# Patient Record
Sex: MALE | Race: WHITE | NOT HISPANIC OR LATINO | Employment: OTHER | URBAN - METROPOLITAN AREA
[De-identification: names, ages, dates, MRNs, and addresses within clinical notes are randomized per-mention and may not be internally consistent; named-entity substitution may affect disease eponyms.]

---

## 2019-08-13 ENCOUNTER — HOSPITAL ENCOUNTER (OUTPATIENT)
Dept: NON INVASIVE DIAGNOSTICS | Facility: HOSPITAL | Age: 64
Discharge: HOME | DRG: 247 | End: 2019-08-13
Attending: INTERNAL MEDICINE
Payer: MEDICARE

## 2019-08-13 ENCOUNTER — APPOINTMENT (EMERGENCY)
Dept: RADIOLOGY | Facility: HOSPITAL | Age: 64
End: 2019-08-13
Payer: MEDICARE

## 2019-08-13 ENCOUNTER — HOSPITAL ENCOUNTER (INPATIENT)
Facility: HOSPITAL | Age: 64
LOS: 2 days | Discharge: HOME | DRG: 247 | End: 2019-08-15
Attending: INTERNAL MEDICINE | Admitting: INTERNAL MEDICINE
Payer: MEDICARE

## 2019-08-13 ENCOUNTER — HOSPITAL ENCOUNTER (EMERGENCY)
Facility: HOSPITAL | Age: 64
End: 2019-08-13
Attending: EMERGENCY MEDICINE | Admitting: EMERGENCY MEDICINE
Payer: MEDICARE

## 2019-08-13 ENCOUNTER — APPOINTMENT (OUTPATIENT)
Dept: NON INVASIVE DIAGNOSTICS | Facility: HOSPITAL | Age: 64
DRG: 247 | End: 2019-08-13
Attending: INTERNAL MEDICINE
Payer: MEDICARE

## 2019-08-13 VITALS
DIASTOLIC BLOOD PRESSURE: 71 MMHG | RESPIRATION RATE: 18 BRPM | HEART RATE: 90 BPM | OXYGEN SATURATION: 98 % | SYSTOLIC BLOOD PRESSURE: 125 MMHG | TEMPERATURE: 98.1 F

## 2019-08-13 DIAGNOSIS — R07.9 CHEST PAIN: Primary | ICD-10-CM

## 2019-08-13 DIAGNOSIS — R77.8 ELEVATED TROPONIN: ICD-10-CM

## 2019-08-13 DIAGNOSIS — E87.6 HYPOKALEMIA: ICD-10-CM

## 2019-08-13 DIAGNOSIS — I21.3 ACUTE ST ELEVATION MYOCARDIAL INFARCTION (STEMI), UNSPECIFIED ARTERY (HCC): ICD-10-CM

## 2019-08-13 DIAGNOSIS — I21.4 MI, ACUTE, NON ST SEGMENT ELEVATION (HCC): Primary | ICD-10-CM

## 2019-08-13 DIAGNOSIS — I21.3 ST ELEVATION MYOCARDIAL INFARCTION (STEMI), UNSPECIFIED ARTERY (HCC): ICD-10-CM

## 2019-08-13 DIAGNOSIS — I21.9 ACUTE MI (HCC): ICD-10-CM

## 2019-08-13 LAB
ALBUMIN SERPL BCP-MCNC: 3.1 G/DL (ref 3.5–5)
ALBUMIN SERPL BCP-MCNC: 3.6 G/DL (ref 3.5–5)
ALP SERPL-CCNC: 71 U/L (ref 46–116)
ALP SERPL-CCNC: 77 U/L (ref 46–116)
ALT SERPL W P-5'-P-CCNC: 23 U/L (ref 12–78)
ALT SERPL W P-5'-P-CCNC: 31 U/L (ref 12–78)
ANION GAP SERPL CALCULATED.3IONS-SCNC: 11 MMOL/L (ref 4–13)
ANION GAP SERPL CALCULATED.3IONS-SCNC: 8 MMOL/L (ref 4–13)
APTT PPP: 27 SECONDS (ref 24–33)
AST SERPL W P-5'-P-CCNC: 17 U/L (ref 5–45)
AST SERPL W P-5'-P-CCNC: 19 U/L (ref 5–45)
BASOPHILS # BLD AUTO: 0.06 THOUSANDS/ΜL (ref 0–0.1)
BASOPHILS NFR BLD AUTO: 0 % (ref 0–1)
BILIRUB SERPL-MCNC: 0.65 MG/DL (ref 0.2–1)
BILIRUB SERPL-MCNC: 0.8 MG/DL (ref 0.2–1)
BUN SERPL-MCNC: 5 MG/DL (ref 5–25)
BUN SERPL-MCNC: 7 MG/DL (ref 5–25)
CALCIUM SERPL-MCNC: 8.5 MG/DL (ref 8.3–10.1)
CALCIUM SERPL-MCNC: 9.3 MG/DL (ref 8.3–10.1)
CHLORIDE SERPL-SCNC: 95 MMOL/L (ref 100–108)
CHLORIDE SERPL-SCNC: 99 MMOL/L (ref 100–108)
CO2 SERPL-SCNC: 27 MMOL/L (ref 21–32)
CO2 SERPL-SCNC: 30 MMOL/L (ref 21–32)
CREAT SERPL-MCNC: 1.01 MG/DL (ref 0.6–1.3)
CREAT SERPL-MCNC: 1.38 MG/DL (ref 0.6–1.3)
EOSINOPHIL # BLD AUTO: 0.1 THOUSAND/ΜL (ref 0–0.61)
EOSINOPHIL NFR BLD AUTO: 1 % (ref 0–6)
ERYTHROCYTE [DISTWIDTH] IN BLOOD BY AUTOMATED COUNT: 13.7 % (ref 11.6–15.1)
GFR SERPL CREATININE-BSD FRML MDRD: 55 ML/MIN/1.73SQ M
GFR SERPL CREATININE-BSD FRML MDRD: 80 ML/MIN/1.73SQ M
GLUCOSE SERPL-MCNC: 217 MG/DL (ref 65–140)
GLUCOSE SERPL-MCNC: 96 MG/DL (ref 65–140)
HCT VFR BLD AUTO: 52.4 % (ref 36.5–49.3)
HGB BLD-MCNC: 17.7 G/DL (ref 12–17)
IMM GRANULOCYTES # BLD AUTO: 0.06 THOUSAND/UL (ref 0–0.2)
IMM GRANULOCYTES NFR BLD AUTO: 0 % (ref 0–2)
INR PPP: 1.03 (ref 0.86–1.16)
KCT BLD-ACNC: 386 SEC (ref 89–137)
LYMPHOCYTES # BLD AUTO: 3.31 THOUSANDS/ΜL (ref 0.6–4.47)
LYMPHOCYTES NFR BLD AUTO: 21 % (ref 14–44)
MAGNESIUM SERPL-MCNC: 2.4 MG/DL (ref 1.6–2.6)
MCH RBC QN AUTO: 32.7 PG (ref 26.8–34.3)
MCHC RBC AUTO-ENTMCNC: 33.8 G/DL (ref 31.4–37.4)
MCV RBC AUTO: 97 FL (ref 82–98)
MONOCYTES # BLD AUTO: 0.88 THOUSAND/ΜL (ref 0.17–1.22)
MONOCYTES NFR BLD AUTO: 6 % (ref 4–12)
NEUTROPHILS # BLD AUTO: 11.05 THOUSANDS/ΜL (ref 1.85–7.62)
NEUTS SEG NFR BLD AUTO: 72 % (ref 43–75)
NRBC BLD AUTO-RTO: 0 /100 WBCS
PLATELET # BLD AUTO: 263 THOUSANDS/UL (ref 149–390)
PLATELET # BLD AUTO: 307 THOUSANDS/UL (ref 149–390)
PMV BLD AUTO: 10.3 FL (ref 8.9–12.7)
PMV BLD AUTO: 10.4 FL (ref 8.9–12.7)
POTASSIUM SERPL-SCNC: 2.4 MMOL/L (ref 3.5–5.3)
POTASSIUM SERPL-SCNC: 2.7 MMOL/L (ref 3.5–5.3)
PROT SERPL-MCNC: 7.1 G/DL (ref 6.4–8.2)
PROT SERPL-MCNC: 8.1 G/DL (ref 6.4–8.2)
PROTHROMBIN TIME: 10.8 SECONDS (ref 9.4–11.7)
RBC # BLD AUTO: 5.42 MILLION/UL (ref 3.88–5.62)
SODIUM SERPL-SCNC: 134 MMOL/L (ref 136–145)
SODIUM SERPL-SCNC: 136 MMOL/L (ref 136–145)
SPECIMEN SOURCE: ABNORMAL
TROPONIN I SERPL-MCNC: 0.21 NG/ML
TROPONIN I SERPL-MCNC: 0.87 NG/ML
TROPONIN I SERPL-MCNC: 1.53 NG/ML
WBC # BLD AUTO: 15.46 THOUSAND/UL (ref 4.31–10.16)

## 2019-08-13 PROCEDURE — C1725 CATH, TRANSLUMIN NON-LASER: HCPCS | Performed by: INTERNAL MEDICINE

## 2019-08-13 PROCEDURE — 85730 THROMBOPLASTIN TIME PARTIAL: CPT | Performed by: EMERGENCY MEDICINE

## 2019-08-13 PROCEDURE — 99153 MOD SED SAME PHYS/QHP EA: CPT | Performed by: INTERNAL MEDICINE

## 2019-08-13 PROCEDURE — C1887 CATHETER, GUIDING: HCPCS | Performed by: INTERNAL MEDICINE

## 2019-08-13 PROCEDURE — 96365 THER/PROPH/DIAG IV INF INIT: CPT

## 2019-08-13 PROCEDURE — 4A023N7 MEASUREMENT OF CARDIAC SAMPLING AND PRESSURE, LEFT HEART, PERCUTANEOUS APPROACH: ICD-10-PCS | Performed by: INTERNAL MEDICINE

## 2019-08-13 PROCEDURE — 027034Z DILATION OF CORONARY ARTERY, ONE ARTERY WITH DRUG-ELUTING INTRALUMINAL DEVICE, PERCUTANEOUS APPROACH: ICD-10-PCS | Performed by: INTERNAL MEDICINE

## 2019-08-13 PROCEDURE — 85049 AUTOMATED PLATELET COUNT: CPT | Performed by: INTERNAL MEDICINE

## 2019-08-13 PROCEDURE — 99285 EMERGENCY DEPT VISIT HI MDM: CPT

## 2019-08-13 PROCEDURE — 84484 ASSAY OF TROPONIN QUANT: CPT | Performed by: EMERGENCY MEDICINE

## 2019-08-13 PROCEDURE — C1769 GUIDE WIRE: HCPCS | Performed by: INTERNAL MEDICINE

## 2019-08-13 PROCEDURE — 93458 L HRT ARTERY/VENTRICLE ANGIO: CPT | Performed by: INTERNAL MEDICINE

## 2019-08-13 PROCEDURE — 93005 ELECTROCARDIOGRAM TRACING: CPT

## 2019-08-13 PROCEDURE — 02703Z6 DILATION OF CORONARY ARTERY, ONE ARTERY, BIFURCATION, PERCUTANEOUS APPROACH: ICD-10-PCS | Performed by: INTERNAL MEDICINE

## 2019-08-13 PROCEDURE — 92928 PRQ TCAT PLMT NTRAC ST 1 LES: CPT | Performed by: INTERNAL MEDICINE

## 2019-08-13 PROCEDURE — C9606 PERC D-E COR REVASC W AMI S: HCPCS | Performed by: INTERNAL MEDICINE

## 2019-08-13 PROCEDURE — 80053 COMPREHEN METABOLIC PANEL: CPT | Performed by: INTERNAL MEDICINE

## 2019-08-13 PROCEDURE — 99152 MOD SED SAME PHYS/QHP 5/>YRS: CPT | Performed by: INTERNAL MEDICINE

## 2019-08-13 PROCEDURE — 96375 TX/PRO/DX INJ NEW DRUG ADDON: CPT

## 2019-08-13 PROCEDURE — C1874 STENT, COATED/COV W/DEL SYS: HCPCS

## 2019-08-13 PROCEDURE — 85025 COMPLETE CBC W/AUTO DIFF WBC: CPT | Performed by: EMERGENCY MEDICINE

## 2019-08-13 PROCEDURE — 84484 ASSAY OF TROPONIN QUANT: CPT | Performed by: INTERNAL MEDICINE

## 2019-08-13 PROCEDURE — 83735 ASSAY OF MAGNESIUM: CPT | Performed by: INTERNAL MEDICINE

## 2019-08-13 PROCEDURE — C1894 INTRO/SHEATH, NON-LASER: HCPCS | Performed by: INTERNAL MEDICINE

## 2019-08-13 PROCEDURE — 36415 COLL VENOUS BLD VENIPUNCTURE: CPT | Performed by: EMERGENCY MEDICINE

## 2019-08-13 PROCEDURE — 85610 PROTHROMBIN TIME: CPT | Performed by: EMERGENCY MEDICINE

## 2019-08-13 PROCEDURE — B2111ZZ FLUOROSCOPY OF MULTIPLE CORONARY ARTERIES USING LOW OSMOLAR CONTRAST: ICD-10-PCS | Performed by: INTERNAL MEDICINE

## 2019-08-13 PROCEDURE — 80053 COMPREHEN METABOLIC PANEL: CPT | Performed by: EMERGENCY MEDICINE

## 2019-08-13 PROCEDURE — 71045 X-RAY EXAM CHEST 1 VIEW: CPT

## 2019-08-13 PROCEDURE — NC001 PR NO CHARGE: Performed by: INTERNAL MEDICINE

## 2019-08-13 PROCEDURE — 99232 SBSQ HOSP IP/OBS MODERATE 35: CPT | Performed by: PHYSICIAN ASSISTANT

## 2019-08-13 PROCEDURE — 85347 COAGULATION TIME ACTIVATED: CPT

## 2019-08-13 RX ORDER — ACETAMINOPHEN 325 MG/1
650 TABLET ORAL EVERY 6 HOURS PRN
Status: DISCONTINUED | OUTPATIENT
Start: 2019-08-13 | End: 2019-08-15 | Stop reason: HOSPADM

## 2019-08-13 RX ORDER — NITROGLYCERIN 0.4 MG/1
0.4 TABLET SUBLINGUAL ONCE
Status: COMPLETED | OUTPATIENT
Start: 2019-08-13 | End: 2019-08-13

## 2019-08-13 RX ORDER — HEPARIN SODIUM 5000 [USP'U]/ML
5000 INJECTION, SOLUTION INTRAVENOUS; SUBCUTANEOUS EVERY 8 HOURS SCHEDULED
Status: DISCONTINUED | OUTPATIENT
Start: 2019-08-13 | End: 2019-08-15 | Stop reason: HOSPADM

## 2019-08-13 RX ORDER — ASPIRIN 81 MG/1
324 TABLET, CHEWABLE ORAL ONCE
Status: COMPLETED | OUTPATIENT
Start: 2019-08-13 | End: 2019-08-13

## 2019-08-13 RX ORDER — HEPARIN SODIUM 1000 [USP'U]/ML
INJECTION, SOLUTION INTRAVENOUS; SUBCUTANEOUS CODE/TRAUMA/SEDATION MEDICATION
Status: COMPLETED | OUTPATIENT
Start: 2019-08-13 | End: 2019-08-13

## 2019-08-13 RX ORDER — ZIPRASIDONE HYDROCHLORIDE 20 MG/1
20 CAPSULE ORAL 2 TIMES DAILY WITH MEALS
Status: DISCONTINUED | OUTPATIENT
Start: 2019-08-14 | End: 2019-08-15 | Stop reason: HOSPADM

## 2019-08-13 RX ORDER — POTASSIUM CHLORIDE 20 MEQ/1
40 TABLET, EXTENDED RELEASE ORAL ONCE
Status: COMPLETED | OUTPATIENT
Start: 2019-08-13 | End: 2019-08-13

## 2019-08-13 RX ORDER — ATORVASTATIN CALCIUM 80 MG/1
80 TABLET, FILM COATED ORAL EVERY EVENING
Status: DISCONTINUED | OUTPATIENT
Start: 2019-08-13 | End: 2019-08-15 | Stop reason: HOSPADM

## 2019-08-13 RX ORDER — TOPIRAMATE 100 MG/1
100 TABLET, FILM COATED ORAL DAILY
Status: DISCONTINUED | OUTPATIENT
Start: 2019-08-14 | End: 2019-08-15 | Stop reason: HOSPADM

## 2019-08-13 RX ORDER — SODIUM CHLORIDE 9 MG/ML
100 INJECTION, SOLUTION INTRAVENOUS CONTINUOUS
Status: DISPENSED | OUTPATIENT
Start: 2019-08-13 | End: 2019-08-14

## 2019-08-13 RX ORDER — FENTANYL CITRATE 50 UG/ML
INJECTION, SOLUTION INTRAMUSCULAR; INTRAVENOUS CODE/TRAUMA/SEDATION MEDICATION
Status: COMPLETED | OUTPATIENT
Start: 2019-08-13 | End: 2019-08-13

## 2019-08-13 RX ORDER — LIDOCAINE HYDROCHLORIDE 10 MG/ML
INJECTION, SOLUTION INFILTRATION; PERINEURAL CODE/TRAUMA/SEDATION MEDICATION
Status: COMPLETED | OUTPATIENT
Start: 2019-08-13 | End: 2019-08-13

## 2019-08-13 RX ORDER — HEPARIN SODIUM 1000 [USP'U]/ML
4000 INJECTION, SOLUTION INTRAVENOUS; SUBCUTANEOUS ONCE
Status: COMPLETED | OUTPATIENT
Start: 2019-08-13 | End: 2019-08-13

## 2019-08-13 RX ORDER — POTASSIUM CHLORIDE 20 MEQ/1
40 TABLET, EXTENDED RELEASE ORAL ONCE
Status: DISCONTINUED | OUTPATIENT
Start: 2019-08-13 | End: 2019-08-13

## 2019-08-13 RX ORDER — VERAPAMIL HYDROCHLORIDE 2.5 MG/ML
INJECTION, SOLUTION INTRAVENOUS CODE/TRAUMA/SEDATION MEDICATION
Status: COMPLETED | OUTPATIENT
Start: 2019-08-13 | End: 2019-08-13

## 2019-08-13 RX ORDER — POTASSIUM CHLORIDE 14.9 MG/ML
20 INJECTION INTRAVENOUS ONCE
Status: DISCONTINUED | OUTPATIENT
Start: 2019-08-13 | End: 2019-08-13 | Stop reason: HOSPADM

## 2019-08-13 RX ORDER — ZIPRASIDONE HYDROCHLORIDE 20 MG/1
20 CAPSULE ORAL 2 TIMES DAILY WITH MEALS
COMMUNITY
End: 2019-11-05

## 2019-08-13 RX ORDER — NICOTINE 21 MG/24HR
1 PATCH, TRANSDERMAL 24 HOURS TRANSDERMAL DAILY
Status: DISCONTINUED | OUTPATIENT
Start: 2019-08-14 | End: 2019-08-15 | Stop reason: HOSPADM

## 2019-08-13 RX ORDER — POTASSIUM CHLORIDE 20 MEQ/1
20 TABLET, EXTENDED RELEASE ORAL ONCE
Status: COMPLETED | OUTPATIENT
Start: 2019-08-13 | End: 2019-08-13

## 2019-08-13 RX ORDER — NITROGLYCERIN 0.4 MG/1
0.4 TABLET SUBLINGUAL
Status: DISCONTINUED | OUTPATIENT
Start: 2019-08-13 | End: 2019-08-15 | Stop reason: HOSPADM

## 2019-08-13 RX ORDER — ONDANSETRON 2 MG/ML
4 INJECTION INTRAMUSCULAR; INTRAVENOUS EVERY 6 HOURS PRN
Status: DISCONTINUED | OUTPATIENT
Start: 2019-08-13 | End: 2019-08-15 | Stop reason: HOSPADM

## 2019-08-13 RX ORDER — ASPIRIN 81 MG/1
81 TABLET, CHEWABLE ORAL DAILY
Status: DISCONTINUED | OUTPATIENT
Start: 2019-08-14 | End: 2019-08-15 | Stop reason: HOSPADM

## 2019-08-13 RX ORDER — NITROGLYCERIN 20 MG/100ML
INJECTION INTRAVENOUS CODE/TRAUMA/SEDATION MEDICATION
Status: COMPLETED | OUTPATIENT
Start: 2019-08-13 | End: 2019-08-13

## 2019-08-13 RX ORDER — SODIUM CHLORIDE 9 MG/ML
INJECTION, SOLUTION INTRAVENOUS
Status: COMPLETED | OUTPATIENT
Start: 2019-08-13 | End: 2019-08-13

## 2019-08-13 RX ORDER — POTASSIUM CHLORIDE 14.9 MG/ML
20 INJECTION INTRAVENOUS
Status: COMPLETED | OUTPATIENT
Start: 2019-08-13 | End: 2019-08-14

## 2019-08-13 RX ORDER — MIDAZOLAM HYDROCHLORIDE 1 MG/ML
INJECTION INTRAMUSCULAR; INTRAVENOUS CODE/TRAUMA/SEDATION MEDICATION
Status: COMPLETED | OUTPATIENT
Start: 2019-08-13 | End: 2019-08-13

## 2019-08-13 RX ORDER — POTASSIUM CHLORIDE 20 MEQ/1
20 TABLET, EXTENDED RELEASE ORAL ONCE
Status: DISCONTINUED | OUTPATIENT
Start: 2019-08-13 | End: 2019-08-13

## 2019-08-13 RX ORDER — POTASSIUM CHLORIDE 14.9 MG/ML
20 INJECTION INTRAVENOUS ONCE
Status: COMPLETED | OUTPATIENT
Start: 2019-08-13 | End: 2019-08-14

## 2019-08-13 RX ADMIN — POTASSIUM CHLORIDE 40 MEQ: 1500 TABLET, EXTENDED RELEASE ORAL at 22:46

## 2019-08-13 RX ADMIN — POTASSIUM CHLORIDE 20 MEQ: 200 INJECTION, SOLUTION INTRAVENOUS at 22:00

## 2019-08-13 RX ADMIN — HEPARIN SODIUM 4000 UNITS: 1000 INJECTION INTRAVENOUS; SUBCUTANEOUS at 18:35

## 2019-08-13 RX ADMIN — FENTANYL CITRATE 50 MCG: 50 INJECTION, SOLUTION INTRAMUSCULAR; INTRAVENOUS at 19:45

## 2019-08-13 RX ADMIN — IOHEXOL 75 ML: 350 INJECTION, SOLUTION INTRAVENOUS at 20:19

## 2019-08-13 RX ADMIN — NITROGLYCERIN 200 MCG: 20 INJECTION INTRAVENOUS at 19:26

## 2019-08-13 RX ADMIN — MORPHINE SULFATE 2 MG: 2 INJECTION, SOLUTION INTRAMUSCULAR; INTRAVENOUS at 18:20

## 2019-08-13 RX ADMIN — MIDAZOLAM 2 MG: 1 INJECTION INTRAMUSCULAR; INTRAVENOUS at 19:23

## 2019-08-13 RX ADMIN — POTASSIUM CHLORIDE 20 MEQ: 1500 TABLET, EXTENDED RELEASE ORAL at 18:24

## 2019-08-13 RX ADMIN — MIDAZOLAM 2 MG: 1 INJECTION INTRAMUSCULAR; INTRAVENOUS at 20:05

## 2019-08-13 RX ADMIN — FENTANYL CITRATE 50 MCG: 50 INJECTION, SOLUTION INTRAMUSCULAR; INTRAVENOUS at 20:05

## 2019-08-13 RX ADMIN — IOHEXOL 130 ML: 350 INJECTION, SOLUTION INTRAVENOUS at 19:51

## 2019-08-13 RX ADMIN — HEPARIN SODIUM 5000 UNITS: 5000 INJECTION INTRAVENOUS; SUBCUTANEOUS at 21:51

## 2019-08-13 RX ADMIN — VERAPAMIL HYDROCHLORIDE 2.5 MG: 2.5 INJECTION, SOLUTION INTRAVENOUS at 19:26

## 2019-08-13 RX ADMIN — TICAGRELOR 180 MG: 90 TABLET ORAL at 18:34

## 2019-08-13 RX ADMIN — POTASSIUM CHLORIDE 40 MEQ: 1500 TABLET, EXTENDED RELEASE ORAL at 22:00

## 2019-08-13 RX ADMIN — NITROGLYCERIN 0.4 MG: 0.4 TABLET SUBLINGUAL at 17:31

## 2019-08-13 RX ADMIN — ASPIRIN 81 MG 324 MG: 81 TABLET ORAL at 17:30

## 2019-08-13 RX ADMIN — HEPARIN SODIUM 6000 UNITS: 1000 INJECTION INTRAVENOUS; SUBCUTANEOUS at 19:31

## 2019-08-13 RX ADMIN — LIDOCAINE HYDROCHLORIDE 1 ML: 10 INJECTION, SOLUTION INFILTRATION; PERINEURAL at 19:25

## 2019-08-13 RX ADMIN — MIDAZOLAM 2 MG: 1 INJECTION INTRAMUSCULAR; INTRAVENOUS at 19:45

## 2019-08-13 RX ADMIN — FENTANYL CITRATE 50 MCG: 50 INJECTION, SOLUTION INTRAMUSCULAR; INTRAVENOUS at 19:23

## 2019-08-13 RX ADMIN — POTASSIUM CHLORIDE 20 MEQ: 200 INJECTION, SOLUTION INTRAVENOUS at 18:24

## 2019-08-13 RX ADMIN — SODIUM CHLORIDE 100 ML/HR: 0.9 INJECTION, SOLUTION INTRAVENOUS at 19:23

## 2019-08-13 RX ADMIN — ATORVASTATIN CALCIUM 80 MG: 80 TABLET, FILM COATED ORAL at 21:51

## 2019-08-13 NOTE — ED PROVIDER NOTES
History  Chief Complaint   Patient presents with    Chest Pain     Pt c/o midsternal chest pain for twqo hours, radiating to left arm with nausea  65 yo male c/o sudden onset mid substernal chest pain that started at rest 2 hours ago   +radiating to left arm   + one episode of nausea and vomiting when it started  Pain has been constant  No h/o DM or HTN but does have h/o high cholesterol and is supposed to be on a statin  + smoker  No recent illness - no cough, fever, diarrhea, sob  Pt  Says he thinks he needs to stay here a few days  History provided by:  Patient   used: No        Prior to Admission Medications   Prescriptions Last Dose Informant Patient Reported? Taking? Topiramate (TOPAMAX PO)   Yes Yes   Sig: Take by mouth   ziprasidone (GEODON) 20 mg capsule   Yes Yes   Sig: Take 20 mg by mouth 2 (two) times a day with meals      Facility-Administered Medications: None       Past Medical History:   Diagnosis Date    Bipolar 1 disorder ([facility] )     Schizo-affective psychosis ([facility] )        History reviewed  No pertinent surgical history  History reviewed  No pertinent family history  I have reviewed and agree with the history as documented  Social History     Tobacco Use    Smoking status: Heavy Tobacco Smoker     Types: Cigarettes    Smokeless tobacco: Never Used   Substance Use Topics    Alcohol use: Not Currently    Drug use: Never        Review of Systems   Constitutional: Negative  Negative for chills and fever  HENT: Negative  Negative for congestion and sore throat  Eyes: Negative  Respiratory: Negative  Negative for cough and shortness of breath  Cardiovascular: Positive for chest pain  Negative for leg swelling  Gastrointestinal: Positive for nausea  Negative for abdominal pain, diarrhea and vomiting  Genitourinary: Negative  Negative for dysuria, flank pain and hematuria  Musculoskeletal: Positive for back pain  Negative for myalgias  Skin: Negative  Negative for rash and wound  Neurological: Negative  Negative for dizziness and headaches  Psychiatric/Behavioral: Negative  Negative for confusion and hallucinations  The patient is not nervous/anxious  All other systems reviewed and are negative  Physical Exam  Physical Exam   Constitutional: He is oriented to person, place, and time  He appears well-developed and well-nourished  No distress  Pt  Unkempt, Smells like tobacco and feces   HENT:   Head: Normocephalic and atraumatic  Poor dentition   Eyes: Pupils are equal, round, and reactive to light  Conjunctivae are normal  No scleral icterus  Neck: Normal range of motion  Neck supple  Cardiovascular: Normal rate, regular rhythm, normal heart sounds and intact distal pulses  No murmur heard  Pulmonary/Chest: Effort normal and breath sounds normal  No respiratory distress  He exhibits no tenderness  Abdominal: Soft  Bowel sounds are normal  He exhibits no distension  There is no tenderness  Musculoskeletal: Normal range of motion  He exhibits no edema, tenderness or deformity  Neurological: He is alert and oriented to person, place, and time  No cranial nerve deficit  He exhibits normal muscle tone  Skin: Skin is warm and dry  No rash noted  He is not diaphoretic  No erythema  No pallor  Psychiatric: He has a normal mood and affect  His behavior is normal    Nursing note and vitals reviewed        Vital Signs  ED Triage Vitals   Temperature Pulse Respirations Blood Pressure SpO2   08/13/19 1721 08/13/19 1721 08/13/19 1721 08/13/19 1735 08/13/19 1721   98 1 °F (36 7 °C) 70 (!) 24 154/89 99 %      Temp Source Heart Rate Source Patient Position - Orthostatic VS BP Location FiO2 (%)   08/13/19 1721 08/13/19 1721 08/13/19 1721 08/13/19 1721 --   Tympanic Monitor Sitting Left arm       Pain Score       08/13/19 1721       8           Vitals:    08/13/19 1737 08/13/19 1747 08/13/19 1753 08/13/19 1755   BP: 148/84 126/77 142/66 137/68   Pulse: 88  90 94   Patient Position - Orthostatic VS:             Visual Acuity      ED Medications  Medications   potassium chloride 20 mEq IVPB (premix) (20 mEq Intravenous New Bag 8/13/19 1824)   heparin (porcine) injection 4,000 Units (has no administration in time range)   aspirin chewable tablet 324 mg (324 mg Oral Given 8/13/19 1730)   nitroglycerin (NITROSTAT) SL tablet 0 4 mg (0 4 mg Sublingual Given 8/13/19 1731)   morphine injection 2 mg (2 mg Intravenous Given 8/13/19 1820)   potassium chloride (K-DUR,KLOR-CON) CR tablet 20 mEq (20 mEq Oral Given 8/13/19 1824)   ticagrelor (BRILINTA) tablet 180 mg (180 mg Oral Given 8/13/19 1834)       Diagnostic Studies  Results Reviewed     Procedure Component Value Units Date/Time    Comprehensive metabolic panel [[id number]]  (Abnormal) Collected:  08/13/19 1731    Lab Status:  Final result Specimen:  Blood from Arm, Right Updated:  08/13/19 1809     Sodium 136 mmol/L      Potassium 2 4 mmol/L      Chloride 95 mmol/L      CO2 30 mmol/L      ANION GAP 11 mmol/L      BUN 7 mg/dL      Creatinine 1 38 mg/dL      Glucose 217 mg/dL      Calcium 9 3 mg/dL      AST 19 U/L      ALT 31 U/L      Alkaline Phosphatase 77 U/L      Total Protein 8 1 g/dL      Albumin 3 6 g/dL      Total Bilirubin 0 80 mg/dL      eGFR 55 ml/min/1 73sq m     Narrative:       [place] guidelines for Chronic Kidney Disease (CKD):     Stage 1 with normal or high GFR (GFR > 90 mL/min/1 73 square meters)    Stage 2 Mild CKD (GFR = 60-89 mL/min/1 73 square meters)    Stage 3A Moderate CKD (GFR = 45-59 mL/min/1 73 square meters)    Stage 3B Moderate CKD (GFR = 30-44 mL/min/1 73 square meters)    Stage 4 Severe CKD (GFR = 15-29 mL/min/1 73 square meters)    Stage 5 End Stage CKD (GFR <15 mL/min/1 73 square meters)  Note: GFR calculation is accurate only with a steady state creatinine    Troponin I [[id number]]  (Abnormal) Collected:  08/13/19 1731    Lab Status: Final result Specimen:  Blood from Arm, Right Updated:  08/13/19 1806     Troponin I 0 21 ng/mL     Protime-INR [[id number]]  (Normal) Collected:  08/13/19 1731    Lab Status:  Final result Specimen:  Blood from Arm, Right Updated:  08/13/19 1751     Protime 10 8 seconds      INR 1 03    APTT [[id number]]  (Normal) Collected:  08/13/19 1731    Lab Status:  Final result Specimen:  Blood from Arm, Right Updated:  08/13/19 1751     PTT 27 seconds     CBC and differential [[id number]]  (Abnormal) Collected:  08/13/19 1731    Lab Status:  Final result Specimen:  Blood from Arm, Right Updated:  08/13/19 1737     WBC 15 46 Thousand/uL      RBC 5 42 Million/uL      Hemoglobin 17 7 g/dL      Hematocrit 52 4 %      MCV 97 fL      MCH 32 7 pg      MCHC 33 8 g/dL      RDW 13 7 %      MPV 10 4 fL      Platelets 959 Thousands/uL      nRBC 0 /100 WBCs      Neutrophils Relative 72 %      Immat GRANS % 0 %      Lymphocytes Relative 21 %      Monocytes Relative 6 %      Eosinophils Relative 1 %      Basophils Relative 0 %      Neutrophils Absolute 11 05 Thousands/µL      Immature Grans Absolute 0 06 Thousand/uL      Lymphocytes Absolute 3 31 Thousands/µL      Monocytes Absolute 0 88 Thousand/µL      Eosinophils Absolute 0 10 Thousand/µL      Basophils Absolute 0 06 Thousands/µL                  XR chest 1 view portable   ED Interpretation by Valeen Epley, MD (50/54 [id number])   CM, nad                 Procedures  ECG 12 Lead Documentation Only  Date/Time: 8/13/2019 5:34 PM  Performed by: Valeen Epley, MD  Authorized by: Valeen Epley, MD     Indications / Diagnosis:  Chest pain  ECG reviewed by me, the ED Provider: yes    Patient location:  ED  Previous ECG:     Previous ECG:  Unavailable  Interpretation:     Interpretation: abnormal    Rate:     ECG rate:  74    ECG rate assessment: normal    Rhythm:     Rhythm: sinus rhythm    Ectopy:     Ectopy: none    QRS:     QRS axis:  Left  Conduction:     Conduction: normal    ST segments:     ST segments:  Non-specific  T waves:     T waves: normal      CriticalCare Time  Performed by: Liliana Jaimes MD  Authorized by: Liliana Jaimes MD     Critical care provider statement:     Critical care time (minutes):  30    Critical care was necessary to treat or prevent imminent or life-threatening deterioration of the following conditions:  Cardiac failure    Critical care was time spent personally by me on the following activities:  Obtaining history from patient or surrogate, development of treatment plan with patient or surrogate, discussions with consultants, evaluation of patient's response to treatment, examination of patient, interpretation of cardiac output measurements, ordering and review of laboratory studies, ordering and review of radiographic studies and re-evaluation of patient's condition    I assumed direction of critical care for this patient from another provider in my specialty: no             ED Course         HEART Risk Score      Most Recent Value   History  2 Filed at: 08/13/2019 1834   ECG  1 Filed at: 08/13/2019 1834   Age  1 Filed at: 08/13/2019 1834   Risk Factors  1 Filed at: 08/13/2019 1834   Troponin  2 Filed at: 08/13/2019 1834   Heart Score Risk Calculator   History  2 Filed at: 08/13/2019 1834   ECG  1 Filed at: 08/13/2019 1834   Age  1 Filed at: 08/13/2019 1834   Risk Factors  1 Filed at: 08/13/2019 1834   Troponin  2 Filed at: 08/13/2019 1834   HEART Score  7 Filed at: 08/13/2019 1834   HEART Score  7 Filed at: 08/13/2019 1834                            MDM  Number of Diagnoses or Management Options  Acute MI [facility]):   Chest pain:   Elevated troponin:   Hypokalemia:   Diagnosis management comments: 1755- pain down from 10 to a 4 and BP down  1800- repeat EKG, nonspecific anterior ST changes normalized but now with nonspecific ST elevation inferior  Discussed with Dr Aida Rodriguez, troponin elevated, referred to [facility], [address] alert called      1812 - Discussed with Dr Sade Franco and EKGs blanca texted to him  36 - PACS has ambulance on standby  Waiting to hear back from cardiology after he reviews EKGs  Potassium ordered  1822 - pain "almost gone" after morphine  200 - cardiology texted, not clear stemi but concerning with risk factors and will take pt  To lab  Disposition  Final diagnoses:   Chest pain   Elevated troponin   Acute MI ([facility] )   Hypokalemia     Time reflects when diagnosis was documented in both MDM as applicable and the Disposition within this note     Time User Action Codes Description Comment    4/68/0966  7:68 PM New Kody A Add [X66 0] Chest pain     7/79/5226  7:03 PM Delrae Dear Add [O93 1] Elevated troponin     4/58/2108  7:12 PM New Kody A Add [U80 6] Acute MI ([facility] )     5/33/3413  7:31 PM New Kody A Add [Q84 3] Hypokalemia       ED Disposition     ED Disposition Condition Date/Time Comment    Transfer to Another Facility-In Network  Tue Aug 13, 2019  6:27 PM Vin Rader should be transferred out to [facility]*          MD Documentation      Most Recent Value   Patient Condition  The patient has been stabilized such that within reasonable medical probability, no material deterioration of the patient condition or the condition of the unborn child(thony) is likely to result from the transfer   Reason for Transfer  Level of Care needed not available at this facility   Benefits of Transfer  Specialized equipment and/or services available at the receiving facility (Include comment)________________________   Risks of Transfer  Potential for delay in receiving treatment, Loss of IV, Increased discomfort during transfer, Possible worsening of condition or death during transfer   Accepting Physician  Dr Jaziel Cabello, Mike Walkerma   Margie Lyon   Provider Certification  General risk, such as traffic hazards, adverse weather conditions, rough terrain or turbulence, possible failure of equipment (including vehicle or aircraft), or consequences of actions of persons outside the control of the transport personnel, Unanticipated needs of medical equipment and personnel during transport, Risk of worsening condition      RN Documentation      Most [facility] Name, Rosmery [place]      Follow-up Information    None         Patient's Medications   Discharge Prescriptions    No medications on file     No discharge procedures on file      ED Provider  Electronically Signed by           Catracho Saez MD  14/53/19 2254

## 2019-08-13 NOTE — ED NOTES
Attempted to call RN at [facility] lab report x 3, no avail  RN supervisor at [place] contacted, to give report to 5th fl  Report given to [facility], pt is going to room 517        Teresa Wheatley RN  08/13/19 [address] Charlie Murphy RN  08/13/19 5550

## 2019-08-13 NOTE — ED NOTES
Pt refused the 3rd nitro  "The pain is tolerable right now"  After explanation, pt is agreeable to take it  BP is 142/66  Pain 5/10       Geetha Carrillo, RN  08/13/19 Gregory Solorio RN  08/13/19 6751

## 2019-08-13 NOTE — ED NOTES
[facility] transport here, verbal report given to [facility] at bedside        James Riddle RN  08/13/19 5145

## 2019-08-13 NOTE — EMTALA/ACUTE CARE TRANSFER
[address] [address]  Dept: [phone]      EMTALA TRANSFER CONSENT    NAME Susy Graham                                         1955                              MRN [id number]    I have been informed of my rights regarding examination, treatment, and transfer   by Dr Valeen Epley, MD    Benefits: Specialized equipment and/or services available at the receiving facility (Include comment)________________________    Risks: Potential for delay in receiving treatment, Loss of IV, Increased discomfort during transfer, Possible worsening of condition or death during transfer      Consent for Transfer:  I acknowledge that my medical condition has been evaluated and explained to me by the emergency department physician or other qualified medical person and/or my attending physician, who has recommended that I be transferred to the service of  Accepting Physician: Dr Navi Lewis at [facility] Name, [address] : [place], [place]  The above potential benefits of such transfer, the potential risks associated with such transfer, and the probable risks of not being transferred have been explained to me, and I fully understand them  The doctor has explained that, in my case, the benefits of transfer outweigh the risks  I agree to be transferred  I authorize the performance of emergency medical procedures and treatments upon me in both transit and upon arrival at the receiving facility  Additionally, I authorize the release of any and all medical records to the receiving facility and request they be transported with me, if possible  I understand that the safest mode of transportation during a medical emergency is an ambulance and that the Hospital advocates the use of this mode of transport   Risks of traveling to the receiving facility by car, including absence of medical control, life sustaining equipment, such as oxygen, and medical personnel has been explained to me and I fully understand them  ([place] CORRECT BOX BELOW)  [  ]  I consent to the stated transfer and to be transported by ambulance/helicopter  [  ]  I consent to the stated transfer, but refuse transportation by ambulance and accept full responsibility for my transportation by car  I understand the risks of non-ambulance transfers and I exonerate the Hospital and its staff from any deterioration in my condition that results from this refusal     X___________________________________________    DATE  19  TIME________  Signature of patient or legally responsible individual signing on patient behalf           RELATIONSHIP TO PATIENT_________________________          Provider Certification    NAME Sarah Fuentes                                         1955                              MRN [id number]    A medical screening exam was performed on the above named patient  Based on the examination:    Condition Necessitating Transfer The primary encounter diagnosis was Chest pain  Diagnoses of Elevated troponin, Acute MI ([facility] ), and Hypokalemia were also pertinent to this visit      Patient Condition: The patient has been stabilized such that within reasonable medical probability, no material deterioration of the patient condition or the condition of the unborn child(thony) is likely to result from the transfer    Reason for Transfer: Level of Care needed not available at this facility    Transfer Requirements: [place] [address]   · Space available and qualified personnel available for treatment as acknowledged by    · Agreed to accept transfer and to provide appropriate medical treatment as acknowledged by       Dr Thomas Lexx  · Appropriate medical records of the examination and treatment of the patient are provided at the time of transfer   [address] _______  · Transfer will be performed by qualified personnel from    and appropriate transfer equipment as required, including the use of necessary and appropriate life support measures  Provider Certification: I have examined the patient and explained the following risks and benefits of being transferred/refusing transfer to the patient/family:  General risk, such as traffic hazards, adverse weather conditions, rough terrain or turbulence, possible failure of equipment (including vehicle or aircraft), or consequences of actions of persons outside the control of the transport personnel, Unanticipated needs of medical equipment and personnel during transport, Risk of worsening condition      Based on these reasonable risks and benefits to the patient and/or the unborn child(thony), and based upon the information available at the time of the patients examination, I certify that the medical benefits reasonably to be expected from the provision of appropriate medical treatments at another medical facility outweigh the increasing risks, if any, to the individuals medical condition, and in the case of labor to the unborn child, from effecting the transfer      X____________________________________________ DATE 08/13/19        TIME_______      ORIGINAL - SEND TO MEDICAL RECORDS   COPY - SEND WITH PATIENT DURING TRANSFER

## 2019-08-13 NOTE — H&P
H&P Exam - Cardiology   Vin Rader 66 y o  male MRN: [id number]  Unit/Bed#: [facility]PHP OVR Encounter: [id number]    Assessment/Plan     1  Anterior wall MI:  Presented with chest pain of 2 hours duration, noted to have hyperacute T waves anteriorly with initial EKG revealing inferior T-wave inversions , subsequent EKG revealed normalization of T-waves with subtle ST elevations in inferior leads, transferred to  [facility] for emergent [facility]  Catheterization revealed a 95% mid LAD at the origin of diagonal branch, a Xience 3 25X 33 mm Xience stent was deployed across the lesion with SAVITA 3 flow  A kissing balloon was deployed at the bifurcation resulting in good flow down the diagonal  LVEDP 15  See full cath report for details    - aspirin 81, Brilinta 90 b i d  for 1 year  Start metoprolol tartrate 25 b i d , atorvastatin 80 HS  - would check transthoracic echo  - Case management consult for Brilinta price check  2  Hyperlipidemia:  Not on lipid-lowering therapy  Start atorvastatin 80 HS  check Lipid panel  3  Hypokalemia:  Presented with K of 2 4, ongoing repletion, recheck CMP tonight with Mg  4  Bipolar/schizoaffective disorder:  Continue topiramate and suppressive now  5  Tobacco use:  Smoking cessation counseling  Place nicotine patch  History of Present Illness   HPI:  Vin Rader is a 66 y o  male with a past medical history of bipolar disorder, schizoaffective disorder, hyperlipidemia not on medications, smoker, no prior significant cardiac history, presented to [facility] with chest pain  Patient experienced sudden onset substernal chest 2 hours prior to presentation with radiation to left associated nausea and vomiting    His initial EKG revealed hyperacute anterior T waves and  inferior T-wave inversions, was given morphine and nitroglycerin SL which improved his pain, however subsequent EKG revealed evolving inferior ST/T-wave changes with normalization of TWI and new subtle ST elevations  Cardiology was contacted and advised transferred to [facility] for cardiac catheterization given his concerning EKG findings  MI alert was activated  On arrival to [facility] cath lab, he was CP free  Review of Systems    Historical Information   Past Medical History:   Diagnosis Date    Bipolar 1 disorder ([facility] )     Schizo-affective psychosis ([facility] )      Past Surgical History:   Procedure Laterality Date    BACK SURGERY       Social History   Social History     Substance and Sexual Activity   Alcohol Use Not Currently     Social History     Substance and Sexual Activity   Drug Use Never     Social History     Tobacco Use   Smoking Status Heavy Tobacco Smoker    Packs/day: 1 50    Types: Cigarettes   Smokeless Tobacco Never Used     Family History: No family history on file  Meds/Allergies   PTA meds:   Prior to Admission Medications   Prescriptions Last Dose Informant Patient Reported? Taking? Topiramate (TOPAMAX PO)   Yes No   Sig: Take by mouth   ziprasidone (GEODON) 20 mg capsule   Yes No   Sig: Take 20 mg by mouth 2 (two) times a day with meals      Facility-Administered Medications: None     Allergies   Allergen Reactions    Fish-Derived Products        Objective   Vitals: There were no vitals taken for this visit  No intake or output data in the 24 hours ending 08/13/19 1924    Invasive Devices     Peripheral Intravenous Line            Peripheral IV 08/13/19 Left Antecubital less than 1 day    Peripheral IV 08/13/19 Right Antecubital less than 1 day                Physical Exam   Constitutional: He appears well-developed and well-nourished  Eyes: Pupils are equal, round, and reactive to light  Neck: No JVD present  Cardiovascular: Normal rate and regular rhythm  No murmur heard  Pulmonary/Chest: Effort normal and breath sounds normal  No respiratory distress  Abdominal: Soft  He exhibits no distension  There is no tenderness  Musculoskeletal: He exhibits no edema  Lab Results:   CBC with diff:   Results from last 7 days   Lab Units 08/13/19  1731   WBC Thousand/uL 15 46*   RBC Million/uL 5 42   HEMOGLOBIN g/dL 17 7*   HEMATOCRIT % 52 4*   MCV fL 97   MCH pg 32 7   MCHC g/dL 33 8   RDW % 13 7   MPV fL 10 4   PLATELETS Thousands/uL 307     Imaging: I have personally reviewed pertinent reports  Pathology and Other Studies: I have personally reviewed pertinent reports      VTE Prophylaxis: Sequential compression device (Venodyne)

## 2019-08-14 ENCOUNTER — APPOINTMENT (INPATIENT)
Dept: NON INVASIVE DIAGNOSTICS | Facility: HOSPITAL | Age: 64
DRG: 247 | End: 2019-08-14
Payer: MEDICARE

## 2019-08-14 LAB
ALBUMIN SERPL BCP-MCNC: 3.3 G/DL (ref 3.5–5)
ALP SERPL-CCNC: 65 U/L (ref 46–116)
ALT SERPL W P-5'-P-CCNC: 23 U/L (ref 12–78)
ANION GAP SERPL CALCULATED.3IONS-SCNC: 6 MMOL/L (ref 4–13)
ANION GAP SERPL CALCULATED.3IONS-SCNC: 8 MMOL/L (ref 4–13)
AST SERPL W P-5'-P-CCNC: 29 U/L (ref 5–45)
ATRIAL RATE: 59 BPM
ATRIAL RATE: 65 BPM
ATRIAL RATE: 74 BPM
ATRIAL RATE: 91 BPM
BILIRUB SERPL-MCNC: 0.68 MG/DL (ref 0.2–1)
BUN SERPL-MCNC: 5 MG/DL (ref 5–25)
BUN SERPL-MCNC: 6 MG/DL (ref 5–25)
CALCIUM SERPL-MCNC: 8.8 MG/DL (ref 8.3–10.1)
CALCIUM SERPL-MCNC: 9 MG/DL (ref 8.3–10.1)
CHLORIDE SERPL-SCNC: 105 MMOL/L (ref 100–108)
CHLORIDE SERPL-SCNC: 108 MMOL/L (ref 100–108)
CHOLEST SERPL-MCNC: 142 MG/DL (ref 50–200)
CO2 SERPL-SCNC: 26 MMOL/L (ref 21–32)
CO2 SERPL-SCNC: 29 MMOL/L (ref 21–32)
CREAT SERPL-MCNC: 0.69 MG/DL (ref 0.6–1.3)
CREAT SERPL-MCNC: 0.88 MG/DL (ref 0.6–1.3)
ERYTHROCYTE [DISTWIDTH] IN BLOOD BY AUTOMATED COUNT: 14 % (ref 11.6–15.1)
EST. AVERAGE GLUCOSE BLD GHB EST-MCNC: 114 MG/DL
GFR SERPL CREATININE-BSD FRML MDRD: 102 ML/MIN/1.73SQ M
GFR SERPL CREATININE-BSD FRML MDRD: 93 ML/MIN/1.73SQ M
GLUCOSE SERPL-MCNC: 102 MG/DL (ref 65–140)
GLUCOSE SERPL-MCNC: 109 MG/DL (ref 65–140)
HBA1C MFR BLD: 5.6 % (ref 4.2–6.3)
HCT VFR BLD AUTO: 46.6 % (ref 36.5–49.3)
HDLC SERPL-MCNC: 42 MG/DL (ref 40–60)
HGB BLD-MCNC: 15.7 G/DL (ref 12–17)
LDLC SERPL CALC-MCNC: 85 MG/DL (ref 0–100)
MAGNESIUM SERPL-MCNC: 2.6 MG/DL (ref 1.6–2.6)
MCH RBC QN AUTO: 32.6 PG (ref 26.8–34.3)
MCHC RBC AUTO-ENTMCNC: 33.7 G/DL (ref 31.4–37.4)
MCV RBC AUTO: 97 FL (ref 82–98)
NONHDLC SERPL-MCNC: 100 MG/DL
P AXIS: 28 DEGREES
P AXIS: 36 DEGREES
P AXIS: 54 DEGREES
P AXIS: 55 DEGREES
PLATELET # BLD AUTO: 261 THOUSANDS/UL (ref 149–390)
PMV BLD AUTO: 10.4 FL (ref 8.9–12.7)
POTASSIUM SERPL-SCNC: 3.5 MMOL/L (ref 3.5–5.3)
POTASSIUM SERPL-SCNC: 3.9 MMOL/L (ref 3.5–5.3)
PR INTERVAL: 136 MS
PR INTERVAL: 138 MS
PR INTERVAL: 140 MS
PR INTERVAL: 142 MS
PROT SERPL-MCNC: 6.8 G/DL (ref 6.4–8.2)
QRS AXIS: -21 DEGREES
QRS AXIS: -36 DEGREES
QRS AXIS: -44 DEGREES
QRS AXIS: -55 DEGREES
QRSD INTERVAL: 74 MS
QRSD INTERVAL: 80 MS
QRSD INTERVAL: 82 MS
QRSD INTERVAL: 86 MS
QT INTERVAL: 366 MS
QT INTERVAL: 392 MS
QT INTERVAL: 432 MS
QT INTERVAL: 452 MS
QTC INTERVAL: 406 MS
QTC INTERVAL: 447 MS
QTC INTERVAL: 449 MS
QTC INTERVAL: 482 MS
RBC # BLD AUTO: 4.82 MILLION/UL (ref 3.88–5.62)
SODIUM SERPL-SCNC: 140 MMOL/L (ref 136–145)
SODIUM SERPL-SCNC: 142 MMOL/L (ref 136–145)
T WAVE AXIS: -25 DEGREES
T WAVE AXIS: -4 DEGREES
T WAVE AXIS: -49 DEGREES
T WAVE AXIS: 72 DEGREES
TRIGL SERPL-MCNC: 75 MG/DL
TROPONIN I SERPL-MCNC: 2.16 NG/ML
VENTRICULAR RATE: 59 BPM
VENTRICULAR RATE: 65 BPM
VENTRICULAR RATE: 74 BPM
VENTRICULAR RATE: 91 BPM
WBC # BLD AUTO: 12.47 THOUSAND/UL (ref 4.31–10.16)

## 2019-08-14 PROCEDURE — 93010 ELECTROCARDIOGRAM REPORT: CPT | Performed by: INTERNAL MEDICINE

## 2019-08-14 PROCEDURE — 80053 COMPREHEN METABOLIC PANEL: CPT | Performed by: INTERNAL MEDICINE

## 2019-08-14 PROCEDURE — 83735 ASSAY OF MAGNESIUM: CPT | Performed by: INTERNAL MEDICINE

## 2019-08-14 PROCEDURE — 80048 BASIC METABOLIC PNL TOTAL CA: CPT | Performed by: INTERNAL MEDICINE

## 2019-08-14 PROCEDURE — 80061 LIPID PANEL: CPT | Performed by: INTERNAL MEDICINE

## 2019-08-14 PROCEDURE — 93306 TTE W/DOPPLER COMPLETE: CPT

## 2019-08-14 PROCEDURE — 83036 HEMOGLOBIN GLYCOSYLATED A1C: CPT | Performed by: INTERNAL MEDICINE

## 2019-08-14 PROCEDURE — 99232 SBSQ HOSP IP/OBS MODERATE 35: CPT | Performed by: INTERNAL MEDICINE

## 2019-08-14 PROCEDURE — 85027 COMPLETE CBC AUTOMATED: CPT | Performed by: INTERNAL MEDICINE

## 2019-08-14 PROCEDURE — 93005 ELECTROCARDIOGRAM TRACING: CPT

## 2019-08-14 PROCEDURE — 84484 ASSAY OF TROPONIN QUANT: CPT | Performed by: INTERNAL MEDICINE

## 2019-08-14 RX ADMIN — METOPROLOL TARTRATE 25 MG: 25 TABLET, FILM COATED ORAL at 09:35

## 2019-08-14 RX ADMIN — TICAGRELOR 90 MG: 90 TABLET ORAL at 09:31

## 2019-08-14 RX ADMIN — HEPARIN SODIUM 5000 UNITS: 5000 INJECTION INTRAVENOUS; SUBCUTANEOUS at 14:11

## 2019-08-14 RX ADMIN — ATORVASTATIN CALCIUM 80 MG: 80 TABLET, FILM COATED ORAL at 17:10

## 2019-08-14 RX ADMIN — HEPARIN SODIUM 5000 UNITS: 5000 INJECTION INTRAVENOUS; SUBCUTANEOUS at 22:56

## 2019-08-14 RX ADMIN — HEPARIN SODIUM 5000 UNITS: 5000 INJECTION INTRAVENOUS; SUBCUTANEOUS at 06:20

## 2019-08-14 RX ADMIN — TOPIRAMATE 100 MG: 100 TABLET, FILM COATED ORAL at 09:31

## 2019-08-14 RX ADMIN — TICAGRELOR 90 MG: 90 TABLET ORAL at 17:10

## 2019-08-14 RX ADMIN — ZIPRASIDONE HYDROCHLORIDE 20 MG: 20 CAPSULE ORAL at 17:10

## 2019-08-14 RX ADMIN — POTASSIUM CHLORIDE 20 MEQ: 200 INJECTION, SOLUTION INTRAVENOUS at 00:08

## 2019-08-14 RX ADMIN — ASPIRIN 81 MG 81 MG: 81 TABLET ORAL at 09:31

## 2019-08-14 RX ADMIN — ZIPRASIDONE HYDROCHLORIDE 20 MG: 20 CAPSULE ORAL at 06:31

## 2019-08-14 RX ADMIN — POTASSIUM CHLORIDE 20 MEQ: 200 INJECTION, SOLUTION INTRAVENOUS at 02:25

## 2019-08-14 NOTE — PROGRESS NOTES
Yasir Brito         [id number]          1955                 66 y o  Type of Procedure: LHC, mid-LAD PCI CHARBEL x1 with SAVITA-3 flow    Indications: Acute Coronary Syndrome      Findings:severe stenosis mid-LAD 95% s/p successful PCI CHARBEL x1 with SAVITA-3 flow  Complications: none        Access Site:Right radial         Closure Device: TR band        Blood loss: minimal      Plan:  Post EKG stable, patient hemodynamically stable  Continue DAPT in addition to guideline directed medical therapy for CAD  Echo tomorrow, titrate medical therapy with findings  Check AM labs  LVEDP 15mmHg, light IVF hydration  Smoking cessation strongly recommended in addition to aggressive lifestyle modification

## 2019-08-14 NOTE — PLAN OF CARE
Problem: Potential for Falls  Goal: Patient will remain free of falls  Description  INTERVENTIONS:  - Assess patient frequently for physical needs  -  Identify cognitive and physical deficits and behaviors that affect risk of falls    -  [place] fall precautions as indicated by assessment   - Educate patient/family on patient safety including physical limitations  - Instruct patient to call for assistance with activity based on assessment  - Modify environment to reduce risk of injury  - Consider OT/PT consult to assist with strengthening/mobility  Outcome: Progressing     Problem: CARDIOVASCULAR - ADULT  Goal: Maintains optimal cardiac output and hemodynamic stability  Description  INTERVENTIONS:  - Monitor I/O, vital signs and rhythm  - Monitor for S/S and trends of decreased cardiac output  - Administer and titrate ordered vasoactive medications to optimize hemodynamic stability  - Assess quality of pulses, skin color and temperature  - Assess for signs of decreased coronary artery perfusion  - Instruct patient to report change in severity of symptoms  Outcome: Progressing  Goal: Absence of cardiac dysrhythmias or at baseline rhythm  Description  INTERVENTIONS:  - Continuous cardiac monitoring, vital signs, obtain 12 lead EKG if ordered  - Administer antiarrhythmic and heart rate control medications as ordered  - Monitor electrolytes and administer replacement therapy as ordered  Outcome: Progressing     Problem: METABOLIC, FLUID AND ELECTROLYTES - ADULT  Goal: Electrolytes maintained within normal limits  Description  INTERVENTIONS:  - Monitor labs and assess patient for signs and symptoms of electrolyte imbalances  - Administer electrolyte replacement as ordered  - Monitor response to electrolyte replacements, including repeat lab results as appropriate  - Instruct patient on fluid and nutrition as appropriate  Outcome: Progressing     Problem: SKIN/TISSUE INTEGRITY - ADULT  Goal: Skin integrity remains intact  Description  INTERVENTIONS  - Identify patients at risk for skin breakdown  - Assess and monitor skin integrity  - Assess and monitor nutrition and hydration status  - Monitor labs (i e  albumin)  - Assess for incontinence   - Turn and reposition patient  - Assist with mobility/ambulation  - Relieve pressure over bony prominences  - Avoid friction and shearing  - Provide appropriate hygiene as needed including keeping skin clean and dry  - Evaluate need for skin moisturizer/barrier cream  - Collaborate with interdisciplinary team (i e  Nutrition, Rehabilitation, etc )   - Patient/family teaching  Outcome: Progressing

## 2019-08-14 NOTE — CONSULTS
[address] [facility],[address] [facility] 66 y o  male MRN: [id number]  Unit/Bed#: [facility] 508-88 Encounter: [id number]    Physician Requesting Consult: Flaca Blum DO    Reason for Consultation / Chief Complaint: STEMI Alert, s/p PCI    History of Present Illness:  Pari Barnard is a 66 y o  male who presented to [facility] with 2 hours of chest pain with radiation to left arm  EKGs suspicious for acute ischemia  Patient given Brillinta, IV heparin, ASA, SL NTG and MS04  His K+ was 2 4 and treated as well  Emergent transport arranged to [facility] for Cath  At [facility], patient taken to the Cardiac Cath Lab where he was found to have a 95% LAD lesion at the origin of a diagonal branch  A stent was placed across the LAD lesion and a balloon angioplasty of the Diag was performed also  Patient remained stable and transferred to the ICU  I saw patient in the ICU  He was resting comfortably with no c/o CP or SOB  During the interview, I expressed the importance of smoking cessation and medication compliance    he stated that he would not quit smoking, but would take his meds  History obtained from chart review and the patient  Past Medical History:  Past Medical History:   Diagnosis Date    Bipolar 1 disorder ([facility] [address] )     Schizo-affective psychosis ([facility] [address] )        Past Surgical History:  Past Surgical History:   Procedure Laterality Date    BACK SURGERY         Past Family History:  No family history on file  Social History:  Social History     Tobacco Use   Smoking Status Heavy Tobacco Smoker    Packs/day: 1 50    Types: Cigarettes   Smokeless Tobacco Never Used     Social History     Substance and Sexual Activity   Alcohol Use Not Currently     Social History     Substance and Sexual Activity   Drug Use Never     Marital Status: Single    Home Medications:   Prior to Admission medications    Medication Sig Start Date End Date Taking?  Authorizing Provider   Topiramate (TOPAMAX PO) Take by mouth    Historical Provider, MD   ziprasidone (GEODON) 20 mg capsule Take 20 mg by mouth 2 (two) times a day with meals    Historical Provider, MD       Inpatient Medications:  Scheduled Meds:  Current Facility-Administered Medications:  acetaminophen 650 mg Oral Q6H PRN Jignesh Llamas MD   [START ON 8/14/2019] aspirin 81 mg Oral Daily Jignesh Llamas MD   atorvastatin 80 mg Oral QPM Jignesh Llamas MD   heparin (porcine) 5,000 Units Subcutaneous [facility] Jignesh Llamas MD   [START ON 8/14/2019] metoprolol tartrate 25 mg Oral Q12H [address] Jignesh Llamas MD   [START ON 8/14/2019] nicotine 1 patch Transdermal Daily Jignesh Llamas MD   nitroglycerin 0 4 mg Sublingual Q5 Min PRN Jignesh Llamas MD   ondansetron 4 mg Intravenous Q6H PRN Jignesh Llamas MD   potassium chloride 40 mEq Oral Once Jignesh Llamas MD   potassium chloride 20 mEq Intravenous Once Aspen Armstrong MD   sodium chloride 100 mL/hr Intravenous Continuous Jignesh Llamas MD   [START ON 8/14/2019] ticagrelor 90 mg Oral BID Jignesh Llamas MD   [START ON 8/14/2019] topiramate 100 mg Oral Daily Betty Suarez DO   [START ON 8/14/2019] ziprasidone 20 mg Oral BID With Meals Jignesh Llamas MD     Continuous Infusions:  sodium chloride 100 mL/hr     PRN Meds:    acetaminophen 650 mg Q6H PRN   nitroglycerin 0 4 mg Q5 Min PRN   ondansetron 4 mg Q6H PRN       Allergies: Allergies   Allergen Reactions    Fish-Derived Products        ROS:   Review of Systems   Constitutional: Negative for fever  HENT: Positive for dental problem  Eyes:        "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  Respiratory: Negative for chest tightness and shortness of breath  Cardiovascular: Positive for palpitations  Negative for chest pain and leg swelling  Gastrointestinal: Negative for abdominal pain and nausea  Neurological: Negative for weakness and light-headedness  Psychiatric/Behavioral: The patient is not nervous/anxious  Vitals:  Vitals:    19 1939   SpO2: 94%   Weight: 106 kg (233 lb 11 oz)     Temperature:   Temp (24hrs), Av 1 °F (36 7 °C), Min:98 1 °F (36 7 °C), Max:98 1 °F (36 7 °C)    Current      Weights:   IBW: -88 kg  There is no height or weight on file to calculate BMI  Hemodynamic Monitoring:  N/A     Non-Invasive/Invasive Ventilation Settings:  Respiratory    Lab Data (Last 4 hours)    None         O2/Vent Data (Last 4 hours)    None              No results found for: PHART, KQG6RWO, PO2ART, OCL5ONT, R1LLRKFH, BEART, SOURCE  SpO2: SpO2: 97 %     Physical Exam:  Physical Exam   Constitutional: He is oriented to person, place, and time  No distress  HENT:   Teeth in poor condition with many missing teeth and multiple obvious caries  Eyes: No scleral icterus  + strabismus OD   Neck: No JVD present  Cardiovascular: Normal rate and regular rhythm  Exam reveals no gallop  No murmur heard  Right radial artery compression band in place  + hemostasis  Right hand neuro/vasc intact  Left radial pulse 1+  Femoral and DP pulses 1-2+  Pulmonary/Chest: Breath sounds normal  No respiratory distress  Abdominal: Soft  Bowel sounds are normal  He exhibits no distension and no mass  There is no tenderness  Musculoskeletal: He exhibits no edema or deformity  Neurological: He is oriented to person, place, and time  No cranial nerve deficit  Skin: Skin is warm and dry  Psychiatric: He has a normal mood and affect         Labs:  Results from last 7 days   Lab Units 19  1731   WBC Thousand/uL  --  15 46*   HEMOGLOBIN g/dL  --  17 7*   HEMATOCRIT %  --  52 4*   PLATELETS Thousands/uL 263 307   NEUTROS PCT %  --  72   MONOS PCT %  --  6     Results from last 7 days   Lab Units 19  1731   SODIUM mmol/L 136   POTASSIUM mmol/L 2 4*   CHLORIDE mmol/L 95*   CO2 mmol/L 30   ANION GAP mmol/L 11   BUN mg/dL 7   CREATININE mg/dL 1 38*   CALCIUM mg/dL 9 3   GLUCOSE RANDOM mg/dL 217*   ALT U/L 31   AST U/L 19   ALK PHOS U/L 77   ALBUMIN g/dL 3 6   TOTAL BILIRUBIN mg/dL 0 80          Results from last 7 days   Lab Units 19  1731   INR  1 03   PTT seconds 27      Results from last 7 days   Lab Units 19  1731   TROPONIN I ng/mL 0 21*         ABG:    VBG:          Imaging: Cxr clear I have personally reviewed pertinent reports  EKG: NSR with PACs This was personally reviewed by myself  Micro:        ______________________________________________________________________    Assessment and Plan: Active Problems:    * No active hospital problems  *  Resolved Problems:    * No resolved hospital problems  *      Neuro / Psych: stable  Follow routine neuro evals  Continue Geodon and Topamax as PTA  CV: Continue ASA, Brillinta, statin, BB per Cardiology, Observe for reperfusion rhythm  Will need smoking cessation arrangements prior to discharge  Also, needs dental f/u  Lun as needed for 02 sat >92%, prn nebs  GI: Diet as tolerated per Cards  : Follow urine output & daily renal labs, IVF at 100 cc/hr x 6 hour for post-cath hydration  ID: needs dental f/u    Heme: SQ heparin,    Endo: Follow routine blood glucose level checks    Msk/Skin: no issues    Disposition: being transferred to [facility] status by Cards team     Counseling / Coordination of Care  Total time spent today 35 minutes  Greater than 50% of total time was spent with the patient and / or family counseling and / or coordination of care  A description of the counseling / coordination of care: discussed plan with patient, RN and CCM MD  ______________________________________________________________________    VTE Pharmacologic Prophylaxis: Heparin  VTE Mechanical Prophylaxis: sequential compression device    Invasive lines and devices:   Invasive Devices     Peripheral Intravenous Line Peripheral IV 08/13/19 Left Antecubital less than 1 day    Peripheral IV 08/13/19 Right Antecubital less than 1 day                Code Status: Level 1 - Full Code  POA:    POLST:      Given critical illness, patient length of stay will require greater than two midnights  Portions of the record may have been created with voice recognition software  Occasional wrong word or "sound a like" substitutions may have occurred due to the inherent limitations of voice recognition software  Read the chart carefully and recognize, using context, where substitutions have occurred        Oneyda Galo PA-C    Consults

## 2019-08-14 NOTE — PROGRESS NOTES
Pt received from critical care unit in stable condition  No signs of bleeding or hematoma noted to right radial puncture site  TR Band remains in place  +2 radial pulse  Right hand pink and warm with <2 sec cap refill

## 2019-08-14 NOTE — PHYSICIAN ADVISOR
Current patient class: Inpatient  The patient is currently on Hospital Day: 2 at [facility]      The patient was admitted to the hospital at [facility] on 8/13/19 for the following diagnosis:  Myocardial infarction [facility]) [I21 9]       There is documentation in the medical record of an expected length of stay of at least 2 midnights  The patient is therefore expected to satisfy the 2 midnight benchmark and given the 2 midnight presumption is appropriate for INPATIENT ADMISSION  Given this expectation of a satisfying stay, CMS instructs us that the patient is most often appropriate for inpatient admission under part A provided medical necessity is documented in the chart  After review of the relevant documentation, labs, vital signs and test results, the patient is appropriate for INPATIENT ADMISSION  Admission to the hospital as an inpatient is a complex decision making process which requires the practitioner to consider the patients presenting complaint, history and physical examination and all relevant testing  With this in mind, in this case, the patient was deemed appropriate for INPATIENT ADMISSION  After review of the documentation and testing available at the time of the admission I concur with this clinical determination of medical necessity  Rationale is as follows: The patient is a 66 yrs old Male who presented to the ED at 8/13/2019  7:17 PM with a chief complaint of No chief complaint on file  Patient admitted with a report of chest pain associated with N/V  He was found to have acute EKG changes and a MI alert was called  Abnormal labs included a potassium of 2 4 and a troponin which peaked at 2 16  He was taken to the cardiac cath lab and was found to have severe stenosis of the mid-LAD with 95% stenosis  He had a successful PCI-CHARBEL  He is diagnosed with have acute coronary syndrome    Ongoing close observation continues and a two night admission class to the hospital would be considered appropriate  The patients vitals on arrival were ED Triage Vitals   Temperature Pulse Respirations Blood Pressure SpO2   08/13/19 2142 08/13/19 2100 08/13/19 2100 08/13/19 2100 08/13/19 1939   98 4 °F (36 9 °C) 70 18 109/66 94 %      Temp Source Heart Rate Source Patient Position - Orthostatic VS BP Location FiO2 (%)   08/13/19 2100 -- 08/14/19 1122 08/14/19 1122 --   Oral  Sitting Right arm       Pain Score       08/13/19 2127       No Pain           Past Medical History:   Diagnosis Date    Bipolar 1 disorder ([facility] [address] )     Schizo-affective psychosis ([facility] [address] )      Past Surgical History:   Procedure Laterality Date    BACK SURGERY             Consults have been placed to:   IP CONSULT TO CASE MANAGEMENT  IP CONSULT TO MEDICAL CRITICAL CARE    Vitals:    08/14/19 0800 08/14/19 0935 08/14/19 1113 08/14/19 1122   BP:  142/75  121/74   BP Location:    Right arm   Pulse:  68  (!) 51   Resp:    (!) 24   Temp:    97 9 °F (36 6 °C)   TempSrc:    Oral   SpO2: 97%   98%   Weight:   105 kg (231 lb 4 2 oz)    Height:           Most recent labs:    Recent Labs     08/13/19  1731  08/14/19  0105 08/14/19  0607   WBC 15 46*  --   --  12 47*   HGB 17 7*  --   --  15 7   HCT 52 4*  --   --  46 6      < >  --  261   K 2 4*   < > 3 5 3 9   CALCIUM 9 3   < > 9 0 8 8   BUN 7   < > 6 5   CREATININE 1 38*   < > 0 88 0 69   INR 1 03  --   --   --    TROPONINI 0 21*   < > 2 16*  --    AST 19   < >  --  29   ALT 31   < >  --  23   ALKPHOS 77   < >  --  65    < > = values in this interval not displayed         Scheduled Meds:  Current Facility-Administered Medications:  acetaminophen 650 mg Oral Q6H PRN Jignesh Mccormick MD   aspirin 81 mg Oral Daily Blanquita Christie MD   atorvastatin 80 mg Oral QPM Blanquita Christie MD   heparin (porcine) 5,000 Units Subcutaneous [facility] Jignesh Mccormick MD   metoprolol tartrate 25 mg Oral Q12H [address] Jeffery Goldberg, MD   nicotine 1 patch Transdermal Daily Jeffery Goldberg, MD   nitroglycerin 0 4 mg Sublingual Q5 Min PRN Jignesh Vo MD   ondansetron 4 mg Intravenous Q6H PRN Jignesh Vo MD   ticagrelor 90 mg Oral BID Jeffery Goldberg, MD   topiramate 100 mg Oral Daily Jignesh Vo MD   ziprasidone 20 mg Oral BID With Meals Jignesh Vo MD     Continuous Infusions:   PRN Meds:   acetaminophen    nitroglycerin    ondansetron    Surgical procedures (if appropriate):

## 2019-08-14 NOTE — UTILIZATION REVIEW
Initial Clinical Review    Admission: Date/Time/Statement: 8/13/19 @ 1938     8/13 Transfer from [address] [place] [address] ED    Orders Placed This Encounter   Procedures    Inpatient Admission     Standing Status:   Standing     Number of Occurrences:   1     Order Specific Question:   Admitting Physician     Answer:   Chela Mcginnis [[id number]]     Order Specific Question:   Level of Care     Answer:   Critical Care [15]     Order Specific Question:   Estimated length of stay     Answer:   More than 2 Midnights     Order Specific Question:   Certification     Answer:   I certify that inpatient services are medically necessary for this patient for a duration of greater than two midnights  See H&P and MD Progress Notes for additional information about the patient's course of treatment  Assessment/Plan:   66 y o  male presented to [address] [place] [address] with chest pain  PMH of bipolar disorder, schizoaffective disorder, hyperlipidemia not on medications, smoker, no prior significant cardiac history,    Patient experienced sudden onset substernal chest 2 hours prior to presentation with radiation to left associated nausea and vomiting  His initial EKG revealed hyperacute anterior T waves and  inferior T-wave inversions, was given morphine and nitroglycerin SL which improved his pain, however subsequent EKG revealed evolving inferior ST/T-wave changes with normalization of TWI and new subtle ST elevations  Cardiology was contacted and advised transferred to [facility] for cardiac catheterization given his concerning EKG findings  MI alert was activated  On arrival to [facility] cath lab, he was CP free  EKG - Sinus bradycardia with occasional Premature ventricular complexes  T wave abnormality, consider anterolateral ischemia  Abnormal ECG  When compared with ECG of 13-AUG-2019 22:54, (unconfirmed)  Premature ventricular complexes are now Present    1   Anterior wall MI:  Presented with chest pain of 2 hours duration, noted to have hyperacute T waves anteriorly with initial EKG revealing inferior T-wave inversions , subsequent EKG revealed normalization of T-waves with subtle ST elevations in inferior leads, transferred to  [facility] for emergent [facility]  Catheterization revealed a 95% mid LAD at the origin of diagonal branch, a Xience 3 25X 33 mm Xience stent was deployed across the lesion with SAVITA 3 flow  A kissing balloon was deployed at the bifurcation resulting in good flow down the diagonal  LVEDP 15  See full cath report for details    - aspirin 81, Brilinta 90 b i d  for 1 year  Start metoprolol tartrate 25 b i d , atorvastatin 80 HS  - would check transthoracic echo  - Case management consult for Brilinta price check  2  Hyperlipidemia:  Not on lipid-lowering therapy  Start atorvastatin 80 HS  check Lipid panel  3  Hypokalemia:  Presented with K of 2 4, ongoing repletion, recheck CMP tonight with Mg  4  Bipolar/schizoaffective disorder:  Continue topiramate and suppressive now  5  Tobacco use:  Smoking cessation counseling  Place nicotine patch  8/13 S/P Cardiac Cath - patient taken to the Cardiac Cath Lab where he was found to have a 95% LAD lesion at the origin of a diagonal branch  A stent was placed across the LAD lesion and a balloon angioplasty of the Diag was performed also   Patient remained stable and transferred to the ICU    Vitals   Temperature Pulse Respirations Blood Pressure SpO2   08/13/19 2142 08/13/19 2100 08/13/19 2100 08/13/19 2100 08/13/19 1939   98 4 °F (36 9 °C) 70 18 109/66 94 %      Temp Source Heart Rate Source Patient Position - Orthostatic VS BP Location FiO2 (%)   08/13/19 2100 -- 08/14/19 1122 08/14/19 1122 --   Oral  Sitting Right arm       Pain Score       08/13/19 2127       No Pain        Wt Readings from Last 1 Encounters:   08/14/19 105 kg (231 lb 4 2 oz)     Additional Vital Signs:   O2 RA /14/19 0700  98 3 °F (36 8 °C)  59  17  160/72  89  97 %    08/14/19 0319  97 6 °F (36 4 °C)  53 Abnormal   16  118/78  --  --    08/14/19 0200  --  53 Abnormal   --  127/78  --  --    08/14/19 0030  --  58  --  124/73  --  --    08/14/19 0000  --  64  --  125/67  --  --    08/13/19 2300  97 6 °F (36 4 °C)  65  --  120/77  --          Pertinent Labs/Diagnostic Test Results:   Results from last 7 days   Lab Units 08/14/19  0607 08/13/19 2108 08/13/19  1731   WBC Thousand/uL 12 47*  --  15 46*   HEMOGLOBIN g/dL 15 7  --  17 7*   HEMATOCRIT % 46 6  --  52 4*   PLATELETS Thousands/uL 261 263 307   NEUTROS ABS Thousands/µL  --   --  11 05*         Results from last 7 days   Lab Units 08/14/19  0607 08/14/19 0105 08/13/19 2108 08/13/19  1731   SODIUM mmol/L 142 140 134* 136   POTASSIUM mmol/L 3 9 3 5 2 7* 2 4*   CHLORIDE mmol/L 108 105 99* 95*   CO2 mmol/L 26 29 27 30   ANION GAP mmol/L 8 6 8 11   BUN mg/dL 5 6 5 7   CREATININE mg/dL 0 69 0 88 1 01 1 38*   EGFR ml/min/1 73sq m 102 93 80 55   CALCIUM mg/dL 8 8 9 0 8 5 9 3   MAGNESIUM mg/dL 2 6  --  2 4  --      Results from last 7 days   Lab Units 08/14/19  0607 08/13/19 2108 08/13/19  1731   AST U/L 29 17 19   ALT U/L 23 23 31   ALK PHOS U/L 65 71 77   TOTAL PROTEIN g/dL 6 8 7 1 8 1   ALBUMIN g/dL 3 3* 3 1* 3 6   TOTAL BILIRUBIN mg/dL 0 68 0 65 0 80         Results from last 7 days   Lab Units 08/14/19  0607 08/14/19 0105 08/13/19 2108 08/13/19  1731   GLUCOSE RANDOM mg/dL 109 102 96 217*         Results from last 7 days   Lab Units 08/14/19  0607   HEMOGLOBIN A1C % 5 6   EAG mg/dl 114     Results from last 7 days   Lab Units 08/14/19 0105 08/13/19 2309 08/13/19 2108 08/13/19  1731   TROPONIN I ng/mL 2 16* 1 53* 0 87* 0 21*         Results from last 7 days   Lab Units 08/13/19  1731   PROTIME seconds 10 8   INR  1 03   PTT seconds 27       Past Medical History:   Diagnosis Date    Bipolar 1 disorder ([facility] )     Schizo-affective psychosis ([facility] )      Present on Admission:  **None**    Admitting Diagnosis: Myocardial infarction ([facility] ) [I21 9]     Age/Sex: 66 y o  male     Admission Orders:  Echo  Tele monitoring  Cardiac Cath  IP CONSULT TO MEDICAL CRITICAL CARE    Current Facility-Administered Medications:  acetaminophen 650 mg Oral Q6H PRN   aspirin 81 mg Oral Daily   atorvastatin 80 mg Oral QPM   heparin (porcine) 5,000 Units Subcutaneous Q8H [address]   metoprolol tartrate 25 mg Oral Q12H [address]   nicotine 1 patch Transdermal Daily   nitroglycerin 0 4 mg Sublingual Q5 Min PRN   ondansetron 4 mg Intravenous Q6H PRN   ticagrelor 90 mg Oral BID   topiramate 100 mg Oral Daily   ziprasidone 20 mg Oral BID With Meals     Network Utilization Review Department  Phone: [phone]; Fax [phone]  [email or web address]  ATTENTION: Please call with any questions or concerns to [phone]  and carefully listen to the prompts so that you are directed to the right person  Send all requests for admission clinical reviews, approved or denied determinations and any other requests to fax [phone]   All voicemails are confidential

## 2019-08-14 NOTE — SOCIAL WORK
CM met with pt to discuss DCP and cm role  Pt lives alone in 1st floor apt [address]  Prior to admission pt was independent with ambulation and with ADLS  No prior hx of VNA  Pt's [facility] for [facility] [address] [place]  Pt denies any hx of drug/ETOH or inpt psych stays  Patient/caregiver received discharge checklist  Content reviewed  Patient/caregiver encouraged to participate in discharge plan of care prior to discharge home  CM reviewed d/c planning process including the following: identifying help at home, patient preference for d/c planning needs, Discharge Lounge, [facility] Meds to Bed program, availability of treatment team to discuss questions or concerns patient and/or family may have regarding understanding medications and recognizing signs and symptoms once discharged  CM also encouraged patient to follow up with all recommended appointments after discharge  Patient advised of importance for patient and family to participate in managing patients medical well being  Pt reports that car is [employer] and will need transport at d/c

## 2019-08-14 NOTE — PROGRESS NOTES
Cardiology   Magaly Vu 66 y o  male MRN: [id number]  Unit/Bed#: [facility] 12-46 Encounter: [id number]        Assessment/Plan:    Anterior wall MI s/p 1 CHARBEL to LAD:  -Patient presented yesterday with angina and EKG showing signs of MI  -Cath revealed 95% occlusion of LAD -> 1 CHARBEL placed  -Continue aspirin 81 mg and Brilinta 90 b i d  for 1 year  -Continue metoprolol tartrate 25 b i d  and atorvastatin 80 HS  -Check TTE today  -Continue Telemetry  -Monitor VItals    HLD:  -Continue Lipitor 80 mg HS  Bipolar/Schizoaffective disorder:  -Continue Topiramate 100 mg daily and Geodon 20 mg BID  Tobacco Use:  -Smoking cessation Counseling  -Hypokalemia:  -Presented with a K of 2 4  -Has since resolved (today K 3 9)  -Monitor BMP    Subjective: The patient is a 66year old male with a PMH of bipolar disorder, schizoaffective disorder and HLD s/p PCI with CHARBEL placement for anterior wall MI  He presented to [facility] yesterday with chest pain and had  ECG changes suspicious for ischemia  He was later transferred to [facility] for cardiac catheterization and had 1 CHARBEL placed in his mid LAD which was 95% occluded  Today the patient is doing well  He denies chest pain, shortness of breath, palpitations, orthopnea, PND, pedal edema, syncope, presyncope, diaphoresis, nausea/vomiting  His only complaint is that he has had diarrhea over the last couple days, which he attributes to his diet  He states that the symptoms prior to the PCI have all resolved and he feels much better  Remainder of ROS done and negative    Telemetry: Multiple runs of nonsustained VTach -> 5 episodes  Longest was a 23 beat run of VTach       Net fluid balance: +971 3    Weight: 105 kg    EKG:   Sinus bradycardia with occasional Premature ventricular complexes  T wave abnormality, consider anterolateral ischemia  Abnormal ECG  When compared with ECG of 13-AUG-2019 22:54, (unconfirmed)  Premature ventricular complexes are now Present  Confirmed by Al Michael Andres ([phone]) on 8/14/2019 9:34:15 AM      Historical Information   Past Medical History:   Diagnosis Date    Bipolar 1 disorder ([facility] [address] [facility] )     Schizo-affective psychosis ([facility] )      Past Surgical History:   Procedure Laterality Date    BACK SURGERY       Social History   Social History     Substance and Sexual Activity   Alcohol Use Not Currently     Social History     Substance and Sexual Activity   Drug Use Never     Social History     Tobacco Use   Smoking Status Heavy Tobacco Smoker    Packs/day: 1 50    Types: Cigarettes   Smokeless Tobacco Never Used     Family History: No family history on file  Scheduled Meds:  Current Facility-Administered Medications:  acetaminophen 650 mg Oral Q6H PRN Jignesh Malik MD   aspirin 81 mg Oral Daily Jignesh Malik MD   atorvastatin 80 mg Oral QPM Ant Castro MD   heparin (porcine) 5,000 Units Subcutaneous [facility] Jignesh Malik MD   metoprolol tartrate 25 mg Oral Q12H [address] Jignesh Malik MD   nicotine 1 patch Transdermal Daily Ant Castro MD   nitroglycerin 0 4 mg Sublingual Q5 Min PRN Jignesh Malik MD   ondansetron 4 mg Intravenous Q6H PRN Jignesh Malik MD   ticagrelor 90 mg Oral BID Jignesh Malik MD   topiramate 100 mg Oral Daily Jignesh Malik MD   ziprasidone 20 mg Oral BID With Meals Jignesh Malik MD     Continuous Infusions:   PRN Meds:   acetaminophen    nitroglycerin    ondansetron      Allergies   Allergen Reactions    Fish-Derived Products        Objective   Vitals: Blood pressure 121/74, pulse (!) 51, temperature 97 9 °F (36 6 °C), temperature source Oral, resp  rate (!) 24, height 5' 10" (1 778 m), weight 105 kg (231 lb 4 2 oz), SpO2 98 %  , Body mass index is 33 18 kg/m² , Orthostatic Blood Pressures      Most Recent Value   Blood Pressure  121/74 filed at 08/14/2019 1122   Patient Position - Orthostatic VS  Sitting filed at 08/14/2019 1122            Intake/Output Summary (Last 24 hours) at 8/14/2019 1331  Last data filed at 8/14/2019 1003  Gross per 24 hour   Intake 2471 31 ml   Output 1500 ml   Net 971 31 ml       Invasive Devices     Peripheral Intravenous Line            Peripheral IV 08/13/19 Left Antecubital less than 1 day    Peripheral IV 08/13/19 Right Antecubital less than 1 day                Physical Exam:    General:  AO x3, no acute distress  Cardiac:  S1-S2 normal  No murmurs, rubs or gallops, JVP: None  Lungs:  Clear to auscultation bilaterally, no wheezing or crackles    Abdomen:  Soft nontender nondistended, positive bowel sounds  Extremities:  Warm, well perfused, pulses palpable, no ulcers or rashes  Neuro: Grossly nonfocal  Psych:  Normal affect      Lab Results:   Recent Results (from the past 24 hour(s))   ECG 12 lead    Collection Time: 08/13/19  5:14 PM   Result Value Ref Range    Ventricular Rate 74 BPM    Atrial Rate 74 BPM    UT Interval 136 ms    QRSD Interval 82 ms    QT Interval 366 ms    QTC Interval 406 ms    P Axis 28 degrees    QRS Axis -44 degrees    T Wave Axis -4 degrees   Comprehensive metabolic panel    Collection Time: 08/13/19  5:31 PM   Result Value Ref Range    Sodium 136 136 - 145 mmol/L    Potassium 2 4 (LL) 3 5 - 5 3 mmol/L    Chloride 95 (L) 100 - 108 mmol/L    CO2 30 21 - 32 mmol/L    ANION GAP 11 4 - 13 mmol/L    BUN 7 5 - 25 mg/dL    Creatinine 1 38 (H) 0 60 - 1 30 mg/dL    Glucose 217 (H) 65 - 140 mg/dL    Calcium 9 3 8 3 - 10 1 mg/dL    AST 19 5 - 45 U/L    ALT 31 12 - 78 U/L    Alkaline Phosphatase 77 46 - 116 U/L    Total Protein 8 1 6 4 - 8 2 g/dL    Albumin 3 6 3 5 - 5 0 g/dL    Total Bilirubin 0 80 0 20 - 1 00 mg/dL    eGFR 55 ml/min/1 73sq m   CBC and differential    Collection Time: 08/13/19  5:31 PM   Result Value Ref Range    WBC 15 46 (H) 4 31 - 10 16 Thousand/uL    RBC 5 42 3 88 - 5 62 Million/uL    Hemoglobin 17 7 (H) 12 0 - 17 0 g/dL Hematocrit 52 4 (H) 36 5 - 49 3 %    MCV 97 82 - 98 fL    MCH 32 7 26 8 - 34 3 pg    MCHC 33 8 31 4 - 37 4 g/dL    RDW 13 7 11 6 - 15 1 %    MPV 10 4 8 9 - 12 7 fL    Platelets 800 503 - 502 Thousands/uL    nRBC 0 /100 WBCs    Neutrophils Relative 72 43 - 75 %    Immat GRANS % 0 0 - 2 %    Lymphocytes Relative 21 14 - 44 %    Monocytes Relative 6 4 - 12 %    Eosinophils Relative 1 0 - 6 %    Basophils Relative 0 0 - 1 %    Neutrophils Absolute 11 05 (H) 1 85 - 7 62 Thousands/µL    Immature Grans Absolute 0 06 0 00 - 0 20 Thousand/uL    Lymphocytes Absolute 3 31 0 60 - 4 47 Thousands/µL    Monocytes Absolute 0 88 0 17 - 1 22 Thousand/µL    Eosinophils Absolute 0 10 0 00 - 0 61 Thousand/µL    Basophils Absolute 0 06 0 00 - 0 10 Thousands/µL   Protime-INR    Collection Time: 08/13/19  5:31 PM   Result Value Ref Range    Protime 10 8 9 4 - 11 7 seconds    INR 1 03 0 86 - 1 16   APTT    Collection Time: 08/13/19  5:31 PM   Result Value Ref Range    PTT 27 24 - 33 seconds   Troponin I    Collection Time: 08/13/19  5:31 PM   Result Value Ref Range    Troponin I 0 21 (H) <=0 04 ng/mL   ECG 12 lead    Collection Time: 08/13/19  5:57 PM   Result Value Ref Range    Ventricular Rate 91 BPM    Atrial Rate 91 BPM    IL Interval 138 ms    QRSD Interval 86 ms    QT Interval 392 ms    QTC Interval 482 ms    P Axis 36 degrees    QRS Axis -55 degrees    T Wave Axis 72 degrees   POCT activated clotting time    Collection Time: 08/13/19  7:41 PM   Result Value Ref Range    Activated Clotting Time, i-STAT 386 (H) 89 - 137 sec    Specimen Type VENOUS    Comprehensive metabolic panel - Now    Collection Time: 08/13/19  9:08 PM   Result Value Ref Range    Sodium 134 (L) 136 - 145 mmol/L    Potassium 2 7 (LL) 3 5 - 5 3 mmol/L    Chloride 99 (L) 100 - 108 mmol/L    CO2 27 21 - 32 mmol/L    ANION GAP 8 4 - 13 mmol/L    BUN 5 5 - 25 mg/dL    Creatinine 1 01 0 60 - 1 30 mg/dL    Glucose 96 65 - 140 mg/dL    Calcium 8 5 8 3 - 10 1 mg/dL    AST 17 5 - 45 U/L    ALT 23 12 - 78 U/L    Alkaline Phosphatase 71 46 - 116 U/L    Total Protein 7 1 6 4 - 8 2 g/dL    Albumin 3 1 (L) 3 5 - 5 0 g/dL    Total Bilirubin 0 65 0 20 - 1 00 mg/dL    eGFR 80 ml/min/1 73sq m   Magnesium - Once    Collection Time: 08/13/19  9:08 PM   Result Value Ref Range    Magnesium 2 4 1 6 - 2 6 mg/dL   Troponin I    Collection Time: 08/13/19  9:08 PM   Result Value Ref Range    Troponin I 0 87 (H) <=0 04 ng/mL   Platelet count    Collection Time: 08/13/19  9:08 PM   Result Value Ref Range    Platelets 654 670 - 688 Thousands/uL    MPV 10 3 8 9 - 12 7 fL   ECG 12 lead    Collection Time: 08/13/19 10:54 PM   Result Value Ref Range    Ventricular Rate 65 BPM    Atrial Rate 65 BPM    OH Interval 142 ms    QRSD Interval 80 ms    QT Interval 432 ms    QTC Interval 449 ms    P [place] 55 degrees    QRS Axis -36 degrees    T Wave Axis -49 degrees   Troponin I    Collection Time: 08/13/19 11:09 PM   Result Value Ref Range    Troponin I 1 53 (H) <=0 04 ng/mL   Basic metabolic panel    Collection Time: 08/14/19  1:05 AM   Result Value Ref Range    Sodium 140 136 - 145 mmol/L    Potassium 3 5 3 5 - 5 3 mmol/L    Chloride 105 100 - 108 mmol/L    CO2 29 21 - 32 mmol/L    ANION GAP 6 4 - 13 mmol/L    BUN 6 5 - 25 mg/dL    Creatinine 0 88 0 60 - 1 30 mg/dL    Glucose 102 65 - 140 mg/dL    Calcium 9 0 8 3 - 10 1 mg/dL    eGFR 93 ml/min/1 73sq m   Troponin I    Collection Time: 08/14/19  1:05 AM   Result Value Ref Range    Troponin I 2 16 (H) <=0 04 ng/mL   ECG 12 lead    Collection Time: 08/14/19  1:31 AM   Result Value Ref Range    Ventricular Rate 59 BPM    Atrial Rate 59 BPM    OH Interval 140 ms    QRSD Interval 74 ms    QT Interval 452 ms    QTC Interval 447 ms    P [place] 54 degrees    QRS Axis -21 degrees    T Wave Axis -25 degrees   Comprehensive metabolic panel - Morning Draw    Collection Time: 08/14/19  6:07 AM   Result Value Ref Range    Sodium 142 136 - 145 mmol/L    Potassium 3 9 3 5 - 5 3 mmol/L Chloride 108 100 - 108 mmol/L    CO2 26 21 - 32 mmol/L    ANION GAP 8 4 - 13 mmol/L    BUN 5 5 - 25 mg/dL    Creatinine 0 69 0 60 - 1 30 mg/dL    Glucose 109 65 - 140 mg/dL    Calcium 8 8 8 3 - 10 1 mg/dL    AST 29 5 - 45 U/L    ALT 23 12 - 78 U/L    Alkaline Phosphatase 65 46 - 116 U/L    Total Protein 6 8 6 4 - 8 2 g/dL    Albumin 3 3 (L) 3 5 - 5 0 g/dL    Total Bilirubin 0 68 0 20 - 1 00 mg/dL    eGFR 102 ml/min/1 73sq m   Magnesium - Morning Draw    Collection Time: 08/14/19  6:07 AM   Result Value Ref Range    Magnesium 2 6 1 6 - 2 6 mg/dL   Lipid panel    Collection Time: 08/14/19  6:07 AM   Result Value Ref Range    Cholesterol 142 50 - 200 mg/dL    Triglycerides 75 <=150 mg/dL    HDL, Direct 42 40 - 60 mg/dL    LDL Calculated 85 0 - 100 mg/dL    Non-HDL-Chol (CHOL-HDL) 100 mg/dl   CBC    Collection Time: 08/14/19  6:07 AM   Result Value Ref Range    WBC 12 47 (H) 4 31 - 10 16 Thousand/uL    RBC 4 82 3 88 - 5 62 Million/uL    Hemoglobin 15 7 12 0 - 17 0 g/dL    Hematocrit 46 6 36 5 - 49 3 %    MCV 97 82 - 98 fL    MCH 32 6 26 8 - 34 3 pg    MCHC 33 7 31 4 - 37 4 g/dL    RDW 14 0 11 6 - 15 1 %    Platelets 990 345 - 990 Thousands/uL    MPV 10 4 8 9 - 12 7 fL   Hemoglobin A1C w/ EAG Estimation    Collection Time: 08/14/19  6:07 AM   Result Value Ref Range    Hemoglobin A1C 5 6 4 2 - 6 3 %     mg/dl       Imaging:     I have personally reviewed all pertinent laboratory/tests results  Imaging Studies: Xr Chest 1 View Portable    Result Date: 8/14/2019  Narrative: CHEST INDICATION:   Chest pain  COMPARISON:  None EXAM PERFORMED/VIEWS:  XR CHEST PORTABLE FINDINGS: Heart size is not well evaluated on this single portable AP view  The lungs are clear  No pneumothorax or pleural effusion  Osseous structures appear within normal limits for patient age  Impression: No active pulmonary disease   Workstation performed: [id number]

## 2019-08-14 NOTE — PROGRESS NOTES
Pt had 24 beat run of VT  No S/S  VSS as charted  Spoke with Cardiology fellow Dr Elisha Almanza and made aware  Verbal order obtained for STAT BMP  Also emailed MD a copy of the rhythm strip

## 2019-08-15 VITALS
HEIGHT: 70 IN | HEART RATE: 58 BPM | WEIGHT: 231.04 LBS | TEMPERATURE: 98.1 F | SYSTOLIC BLOOD PRESSURE: 114 MMHG | RESPIRATION RATE: 16 BRPM | OXYGEN SATURATION: 100 % | BODY MASS INDEX: 33.08 KG/M2 | DIASTOLIC BLOOD PRESSURE: 59 MMHG

## 2019-08-15 PROBLEM — I21.19 ACUTE ST ELEVATION MYOCARDIAL INFARCTION (STEMI) OF INFERIOR WALL (HCC): Status: ACTIVE | Noted: 2019-08-15

## 2019-08-15 PROBLEM — I21.19 ACUTE ST ELEVATION MYOCARDIAL INFARCTION (STEMI) OF INFERIOR WALL (HCC): Status: RESOLVED | Noted: 2019-08-15 | Resolved: 2019-08-15

## 2019-08-15 LAB
ALBUMIN SERPL BCP-MCNC: 2.9 G/DL (ref 3.5–5)
ALP SERPL-CCNC: 65 U/L (ref 46–116)
ALT SERPL W P-5'-P-CCNC: 25 U/L (ref 12–78)
ANION GAP SERPL CALCULATED.3IONS-SCNC: 7 MMOL/L (ref 4–13)
AST SERPL W P-5'-P-CCNC: 24 U/L (ref 5–45)
ATRIAL RATE: 70 BPM
BILIRUB SERPL-MCNC: 0.54 MG/DL (ref 0.2–1)
BUN SERPL-MCNC: 6 MG/DL (ref 5–25)
CALCIUM SERPL-MCNC: 8.6 MG/DL (ref 8.3–10.1)
CHLORIDE SERPL-SCNC: 107 MMOL/L (ref 100–108)
CO2 SERPL-SCNC: 26 MMOL/L (ref 21–32)
CREAT SERPL-MCNC: 0.84 MG/DL (ref 0.6–1.3)
ERYTHROCYTE [DISTWIDTH] IN BLOOD BY AUTOMATED COUNT: 14.1 % (ref 11.6–15.1)
GFR SERPL CREATININE-BSD FRML MDRD: 95 ML/MIN/1.73SQ M
GLUCOSE SERPL-MCNC: 95 MG/DL (ref 65–140)
HCT VFR BLD AUTO: 47.5 % (ref 36.5–49.3)
HGB BLD-MCNC: 15.5 G/DL (ref 12–17)
MAGNESIUM SERPL-MCNC: 2.6 MG/DL (ref 1.6–2.6)
MCH RBC QN AUTO: 32.3 PG (ref 26.8–34.3)
MCHC RBC AUTO-ENTMCNC: 32.6 G/DL (ref 31.4–37.4)
MCV RBC AUTO: 99 FL (ref 82–98)
P AXIS: 56 DEGREES
PLATELET # BLD AUTO: 239 THOUSANDS/UL (ref 149–390)
PMV BLD AUTO: 10.6 FL (ref 8.9–12.7)
POTASSIUM SERPL-SCNC: 3.5 MMOL/L (ref 3.5–5.3)
PR INTERVAL: 142 MS
PROT SERPL-MCNC: 6.7 G/DL (ref 6.4–8.2)
QRS AXIS: -3 DEGREES
QRSD INTERVAL: 75 MS
QT INTERVAL: 688 MS
QTC INTERVAL: 743 MS
RBC # BLD AUTO: 4.8 MILLION/UL (ref 3.88–5.62)
SODIUM SERPL-SCNC: 140 MMOL/L (ref 136–145)
T WAVE AXIS: 34 DEGREES
VENTRICULAR RATE: 70 BPM
WBC # BLD AUTO: 12.05 THOUSAND/UL (ref 4.31–10.16)

## 2019-08-15 PROCEDURE — 85027 COMPLETE CBC AUTOMATED: CPT | Performed by: INTERNAL MEDICINE

## 2019-08-15 PROCEDURE — 99238 HOSP IP/OBS DSCHRG MGMT 30/<: CPT | Performed by: INTERNAL MEDICINE

## 2019-08-15 PROCEDURE — 93306 TTE W/DOPPLER COMPLETE: CPT | Performed by: INTERNAL MEDICINE

## 2019-08-15 PROCEDURE — 83735 ASSAY OF MAGNESIUM: CPT | Performed by: INTERNAL MEDICINE

## 2019-08-15 PROCEDURE — 80053 COMPREHEN METABOLIC PANEL: CPT | Performed by: INTERNAL MEDICINE

## 2019-08-15 PROCEDURE — NC001 PR NO CHARGE: Performed by: INTERNAL MEDICINE

## 2019-08-15 PROCEDURE — 93010 ELECTROCARDIOGRAM REPORT: CPT | Performed by: INTERNAL MEDICINE

## 2019-08-15 RX ORDER — CLOPIDOGREL BISULFATE 75 MG/1
300 TABLET ORAL ONCE
Status: COMPLETED | OUTPATIENT
Start: 2019-08-15 | End: 2019-08-15

## 2019-08-15 RX ORDER — ATORVASTATIN CALCIUM 80 MG/1
80 TABLET, FILM COATED ORAL EVERY EVENING
Qty: 90 TABLET | Refills: 3 | Status: SHIPPED | OUTPATIENT
Start: 2019-08-15 | End: 2019-08-16 | Stop reason: SDUPTHER

## 2019-08-15 RX ORDER — CLOPIDOGREL BISULFATE 75 MG/1
75 TABLET ORAL DAILY
Qty: 90 TABLET | Refills: 3 | Status: SHIPPED | OUTPATIENT
Start: 2019-08-16 | End: 2019-12-03 | Stop reason: SDUPTHER

## 2019-08-15 RX ORDER — ASPIRIN 81 MG/1
81 TABLET, CHEWABLE ORAL DAILY
Qty: 90 TABLET | Refills: 3 | Status: SHIPPED | OUTPATIENT
Start: 2019-08-16 | End: 2020-01-09 | Stop reason: SDUPTHER

## 2019-08-15 RX ORDER — NITROGLYCERIN 0.4 MG/1
0.4 TABLET SUBLINGUAL
Qty: 10 TABLET | Refills: 0 | Status: SHIPPED | OUTPATIENT
Start: 2019-08-15 | End: 2019-12-03 | Stop reason: SDUPTHER

## 2019-08-15 RX ORDER — CLOPIDOGREL BISULFATE 75 MG/1
75 TABLET ORAL DAILY
Status: DISCONTINUED | OUTPATIENT
Start: 2019-08-16 | End: 2019-08-15 | Stop reason: HOSPADM

## 2019-08-15 RX ADMIN — ASPIRIN 81 MG 81 MG: 81 TABLET ORAL at 09:42

## 2019-08-15 RX ADMIN — TOPIRAMATE 100 MG: 100 TABLET, FILM COATED ORAL at 09:43

## 2019-08-15 RX ADMIN — CLOPIDOGREL BISULFATE 300 MG: 75 TABLET ORAL at 09:43

## 2019-08-15 RX ADMIN — ZIPRASIDONE HYDROCHLORIDE 20 MG: 20 CAPSULE ORAL at 09:43

## 2019-08-15 RX ADMIN — HEPARIN SODIUM 5000 UNITS: 5000 INJECTION INTRAVENOUS; SUBCUTANEOUS at 05:30

## 2019-08-15 NOTE — PROGRESS NOTES
Cardiology   Ysabel Humphreys 66 y o  male MRN: [id number]  Unit/Bed#: [facility] 12-46 Encounter: [id number]        Assessment/Plan:    Anterior wall MI s/p 1 CHARBEL to LAD:  -Patient presented on 8/13 with angina and EKG showing signs of MI  -Cath on 8/13 revealed 95% occlusion of LAD -> 1 CHARBEL placed  -Continue aspirin 81 mg for 1 year  -Brilinta 90 b i d  was switched to Plavix   -Continue atorvastatin 80 HS  -Decrease Lopressor to 12 5 mg BID prior to D/C due to mild bradycardia  -Echo completed yesterday -> EF 55% with possible hypokinesis of the apical wall(s)  There was also mild aortic regurgitation  -Monitor Vitals  -Okay to D/C today -> cardiology f/u was scheduled    HLD:  -Continue Lipitor 80 mg HS  Bipolar/Schizoaffective disorder:  -Continue Topiramate 100 mg daily and Geodon 20 mg BID  Tobacco Use:  -Smoking cessation Counseling  -Hypokalemia:  -Presented with a K of 2 4  -Has since resolved    Subjective: The patient is a 66year old male with a PMH of bipolar disorder, schizoaffective disorder and HLD s/p PCI with CHARBEL placement for anterior wall MI  He presented to [facility] yesterday with chest pain and had  ECG changes suspicious for ischemia  He was later transferred to [facility] for cardiac catheterization and had 1 CHARBEL placed in his mid LAD which was 95% occluded  Today the patient feels well  He denies any chest pain, shortness of breath, palpitations, orthopnea, PND, pedal edema, abdominal pain, headaches, syncope, presyncope, diaphoresis, nausea/vomiting  Overall the patient states that he feels great and wants to go home       Remainder of ROS done and negative    Telemetry: Multiple episodes of bradycardia over night (HR 30's-40's)     Net fluid balance: +1,525 3    Weight: 105 kg    EKG:   (8/14)  Sinus bradycardia with occasional Premature ventricular complexes  T wave abnormality, consider anterolateral ischemia  Abnormal ECG  When compared with ECG of 13-AUG-2019 22:54, (unconfirmed)  Premature ventricular complexes are now Present  Confirmed by Justino Dee ([id number]) on 8/14/2019 9:34:15 AM    Echo:  (7/78)  Systolic function was normal  Ejection fraction was estimated to be 55 %  There was possible hypokinesis of the apical wall(s)  There was mild aortic regurgitation    Historical Information   Past Medical History:   Diagnosis Date    Bipolar 1 disorder ([facility] )     Schizo-affective psychosis ([facility] )      Past Surgical History:   Procedure Laterality Date    BACK SURGERY       Social History   Social History     Substance and Sexual Activity   Alcohol Use Not Currently     Social History     Substance and Sexual Activity   Drug Use Never     Social History     Tobacco Use   Smoking Status Heavy Tobacco Smoker    Packs/day: 1 50    Types: Cigarettes   Smokeless Tobacco Never Used     Family History: No family history on file  Scheduled Meds:    Current Facility-Administered Medications:  acetaminophen 650 mg Oral Q6H PRN Jignesh Ladd MD   aspirin 81 mg Oral Daily Jignesh Ladd MD   atorvastatin 80 mg Oral QPM Korina López MD   heparin (porcine) 5,000 Units Subcutaneous [facility] Jignesh Ladd MD   metoprolol tartrate 25 mg Oral Q12H [facility] Jignesh Ladd MD   nicotine 1 patch Transdermal Daily Korina López MD   nitroglycerin 0 4 mg Sublingual Q5 Min PRN Jignesh Ladd MD   ondansetron 4 mg Intravenous Q6H PRN Jignesh Ladd MD   ticagrelor 90 mg Oral BID Jignesh Ladd MD   topiramate 100 mg Oral Daily Jignesh Ladd MD   ziprasidone 20 mg Oral BID With Meals Jignesh Ladd MD     Continuous Infusions:   PRN Meds:   acetaminophen    nitroglycerin    ondansetron      Allergies   Allergen Reactions    Fish-Derived Products        Objective   Vitals: Blood pressure 127/69, pulse 59, temperature 98 1 °F (36 7 °C), temperature source Oral, resp  rate 20, height 5' 10" (1 778 m), weight 105 kg (231 lb 0 7 oz), SpO2 99 %  , Body mass index is 33 15 kg/m² ,   Orthostatic Blood Pressures      Most Recent Value   Blood Pressure  127/69 filed at 08/15/2019 9432   Patient Position - Orthostatic VS  Lying filed at 08/14/2019 2300            Intake/Output Summary (Last 24 hours) at 8/15/2019 0810  Last data filed at 8/15/2019 2945  Gross per 24 hour   Intake 1224 ml   Output 1050 ml   Net 174 ml       Invasive Devices     Peripheral Intravenous Line            Peripheral IV 08/13/19 Left Antecubital 1 day    Peripheral IV 08/13/19 Right Antecubital 1 day                Physical Exam:    General:  AO x3, no acute distress  Cardiac:  S1-S2 normal  No murmurs, rubs or gallops, JVP: None  Lungs:  Clear to auscultation bilaterally, no wheezing or crackles    Abdomen:  Soft nontender nondistended, positive bowel sounds  Extremities:  Warm, well perfused, pulses palpable, no ulcers or rashes  Neuro: Grossly nonfocal  Psych:  Normal affect      Lab Results:   Recent Results (from the past 24 hour(s))   Comprehensive metabolic panel - Morning Draw    Collection Time: 08/15/19  5:00 AM   Result Value Ref Range    Sodium 140 136 - 145 mmol/L    Potassium 3 5 3 5 - 5 3 mmol/L    Chloride 107 100 - 108 mmol/L    CO2 26 21 - 32 mmol/L    ANION GAP 7 4 - 13 mmol/L    BUN 6 5 - 25 mg/dL    Creatinine 0 84 0 60 - 1 30 mg/dL    Glucose 95 65 - 140 mg/dL    Calcium 8 6 8 3 - 10 1 mg/dL    AST 24 5 - 45 U/L    ALT 25 12 - 78 U/L    Alkaline Phosphatase 65 46 - 116 U/L    Total Protein 6 7 6 4 - 8 2 g/dL    Albumin 2 9 (L) 3 5 - 5 0 g/dL    Total Bilirubin 0 54 0 20 - 1 00 mg/dL    eGFR 95 ml/min/1 73sq m   Magnesium - Morning Draw    Collection Time: 08/15/19  5:00 AM   Result Value Ref Range    Magnesium 2 6 1 6 - 2 6 mg/dL   CBC    Collection Time: 08/15/19  5:00 AM   Result Value Ref Range    WBC 12 05 (H) 4 31 - 10 16 Thousand/uL    RBC 4 80 3 88 - 5 62 Million/uL    Hemoglobin 15 5 12 0 - 17 0 g/dL    Hematocrit 47 5 36 5 - 49 3 %    MCV 99 (H) 82 - 98 fL    MCH 32 3 26 8 - 34 3 pg    MCHC 32 6 31 4 - 37 4 g/dL    RDW 14 1 11 6 - 15 1 %    Platelets 126 282 - 402 Thousands/uL    MPV 10 6 8 9 - 12 7 fL       Imaging:     I have personally reviewed all pertinent laboratory/tests results  Imaging Studies: Xr Chest 1 View Portable    Result Date: 8/14/2019  Narrative: CHEST INDICATION:   Chest pain  COMPARISON:  None EXAM PERFORMED/VIEWS:  XR CHEST PORTABLE FINDINGS: Heart size is not well evaluated on this single portable AP view  The lungs are clear  No pneumothorax or pleural effusion  Osseous structures appear within normal limits for patient age  Impression: No active pulmonary disease   Workstation performed: [id number]

## 2019-08-15 NOTE — SOCIAL WORK
The patient is medically cleared for discharge  Met with patient who reports he will need ride back to [facility] where his car is parked  Received approval for [employer] transport  Patient signed waiver and Daryle Peer was scheduled

## 2019-08-15 NOTE — DISCHARGE SUMMARY
Cardiology Discharge Summary - Medical Keke Coronado 66 y o  male MRN: [id number]    [address] [facility]  Room / Bed: [address] [facility] [address] Encounter: [id number]    BRIEF OVERVIEW    Admitting Provider: Vonda Bingham DO  Discharge Provider: No att  providers found  Primary Care Physician at Discharge: Dr Shelley Cueva MD    [address] [facility]  Admission Date: 8/13/2019     Discharge Date: 8/15/2019  1:02 PM    Hospital Course  Pt with past medical history including bipolar disorder, schizoaffective disorder hyperlipidemia, no prior cardiac history  He presented to [facility] with complaints of chest pain for 2 hours for pain radiating to the left arm  EKG does reveal signs of acute ischemia  He was transferred to [facility] with plan for cardiac catheterization which revealed 95% mid LAD stenosis  One drug-eluting stent was placed in mid LAD on 08/13  Since then he has been stable moist no complaints of chest pain, shortness of breath, diaphoreses, nausea, vomiting  Status post CABG he was started on Brilinta, aspirin and later changed to clopidogrel due to concern of cost   Plavix load on 8/15 with 300 mg and to continue with 75 mg daily  Atorvastatin 80 mg  Patient currently stable and without symptoms  Case management contacted to arrange transportation home  Presenting Problem/History of Present Illness  Principal Problem (Resolved):    Acute ST elevation myocardial infarction (STEMI) of inferior wall (HCC)  Active Problems:    * No active hospital problems  *    NSTEMI anterior wall  - the cardiac catheterization on 08/13 with 1 drug-eluting stent placed on mid LAD secondary to 95% occlusion  - continue Plavix 75 mg daily  - continue aspirin 81 mg daily  - continue atorvastatin 80 mg daily  - continue Lopressor 12 5 mg b i d   - echocardiogram on 8/13 revealed ejection fraction 55% with possible hypokinesis of the apical wall     - patient currently stable without symptoms and to follow outpatient Cardiology appointment     tobacco abuse  -smoking cessation discussed    Bipolar/psych affective disorder  -continue with topiramate 100 mg daily and Geodon 20 mg b i d  Diagnostic Procedures Performed  Imaging Studies:    Pertinent Labs:       Therapeutic Procedures Performed  8/13 cardiac catheterization    Test Results Pending at Discharge:  None     Medications     Medication List to be Continued at Discharge  Discharge Medication List as of 8/15/2019 12:04 PM      CONTINUE these medications which have NOT CHANGED    Details   Topiramate (TOPAMAX PO) Take by mouth, Historical Med      ziprasidone (GEODON) 20 mg capsule Take 20 mg by mouth 2 (two) times a day with meals, Historical Med           Discharge Medication List as of 8/15/2019 12:04 PM      START taking these medications    Details   aspirin 81 mg chewable tablet Chew 1 tablet (81 mg total) daily, Starting Fri 8/16/2019, Normal      atorvastatin (LIPITOR) 80 mg tablet Take 1 tablet (80 mg total) by mouth every evening, Starting Thu 8/15/2019, Normal      clopidogrel (PLAVIX) 75 mg tablet Take 1 tablet (75 mg total) by mouth daily, Starting Fri 8/16/2019, Normal      metoprolol tartrate (LOPRESSOR) 25 mg tablet Take 0 5 tablets (12 5 mg total) by mouth every 12 (twelve) hours, Starting u 8/15/2019, Normal      nitroglycerin (NITROSTAT) 0 4 mg SL tablet Place 1 tablet (0 4 mg total) under the tongue every 5 (five) minutes as needed for chest pain, Starting u 8/15/2019, Normal           Discharge Medication List as of 8/15/2019 12:04 PM          Allergies  Allergies   Allergen Reactions    Fish-Derived Products      Discharge Diet: cardiac diet  Activity restrictions: activity as tolerated  Discharge Condition: stable  Discharged With Lines: no    Discharge Disposition: Home/Self Care    Outpatient Follow-Up  Cardiology      Code Status: Level 1 - Full Code  Advance Directive and Living Will: <no information>  Power of :    POLST:      Discharge  Statement   I spent 20 minutes minutes discharging the patient  This time was spent on the day of discharge  I had direct contact with the patient on the day of discharge  Additional documentation is required if more than 30 minutes were spent on discharge

## 2019-08-16 ENCOUNTER — OFFICE VISIT (OUTPATIENT)
Dept: CARDIOLOGY CLINIC | Facility: CLINIC | Age: 64
End: 2019-08-16
Payer: MEDICARE

## 2019-08-16 VITALS
SYSTOLIC BLOOD PRESSURE: 118 MMHG | OXYGEN SATURATION: 98 % | HEIGHT: 69 IN | HEART RATE: 78 BPM | BODY MASS INDEX: 34.36 KG/M2 | DIASTOLIC BLOOD PRESSURE: 70 MMHG | WEIGHT: 232 LBS

## 2019-08-16 DIAGNOSIS — I21.4 MI, ACUTE, NON ST SEGMENT ELEVATION (HCC): ICD-10-CM

## 2019-08-16 DIAGNOSIS — I21.02 ACUTE ST ELEVATION MYOCARDIAL INFARCTION (STEMI) INVOLVING LEFT ANTERIOR DESCENDING (LAD) CORONARY ARTERY (HCC): Primary | ICD-10-CM

## 2019-08-16 DIAGNOSIS — Z98.890 S/P LEFT HEART CATHETERIZATION BY PERCUTANEOUS APPROACH: ICD-10-CM

## 2019-08-16 DIAGNOSIS — Z87.891 SMOKING HX: ICD-10-CM

## 2019-08-16 PROCEDURE — 99215 OFFICE O/P EST HI 40 MIN: CPT | Performed by: INTERNAL MEDICINE

## 2019-08-16 PROCEDURE — 93000 ELECTROCARDIOGRAM COMPLETE: CPT | Performed by: INTERNAL MEDICINE

## 2019-08-16 RX ORDER — SILDENAFIL 100 MG/1
100 TABLET, FILM COATED ORAL DAILY PRN
COMMUNITY
End: 2019-12-03

## 2019-08-16 RX ORDER — TOPIRAMATE 100 MG/1
100 TABLET, FILM COATED ORAL 3 TIMES DAILY
Status: ON HOLD | COMMUNITY
Start: 2019-07-03 | End: 2019-12-29 | Stop reason: SDUPTHER

## 2019-08-16 RX ORDER — ATORVASTATIN CALCIUM 80 MG/1
80 TABLET, FILM COATED ORAL EVERY EVENING
Qty: 90 TABLET | Refills: 3 | Status: SHIPPED | OUTPATIENT
Start: 2019-08-16 | End: 2019-12-03 | Stop reason: SDUPTHER

## 2019-08-16 RX ORDER — ZIPRASIDONE HYDROCHLORIDE 40 MG/1
40 CAPSULE ORAL 2 TIMES DAILY
COMMUNITY
Start: 2019-07-03 | End: 2019-12-29 | Stop reason: HOSPADM

## 2019-08-16 RX ORDER — CLONAZEPAM 1 MG/1
TABLET ORAL
COMMUNITY
Start: 2019-07-18 | End: 2019-11-05

## 2019-08-16 NOTE — PROGRESS NOTES
Cardiology Follow Up  Alaina Amin  1955  [id number]  [facility] [address] [id number]    1  Acute ST elevation myocardial infarction (STEMI) involving left anterior descending (LAD) coronary artery (HCC)  POCT ECG   2  S/P left heart catheterization by percutaneous approach  POCT ECG      Discussion/Plan:  Recent STEMI s/p CHARBEL *1 to 95% mid LAD stenosis + POBA of diagonal  Normal EF He will continue aspirin lifelong plus clopidogrel at least for the next 1 year still August 2020  He will continue atorvastatin 80 mg daily  Continue metoprolol 12 5 mg twice a day  Declines cardiac rehab    Possible COPD- recommend PFT/smoking cessation/inhaler therapy    ED- understand not to use viagra with nitroglycerin    Bipolar disorder/schizoaffective disorder    Smoking- he will never quit  refused      Interval History:  72-year-old gentleman with a history of bipolar disorder, longstanding history of smoking with recent hospital admission with some acute LAD STEMI status post transfer to [place] for PCI  Since discharge he denies having chest pain  He denies having shortness of breath  Denies having lower extremity edema  He denies having significant bleeding or bruising  He he is currently on a prescription government program and has refilled his medications  He right now declines stopping smoking  He is about to move to [place]        Social Hx  Moving to [place]- getting thrown out    Patient Active Problem List   Diagnosis   (none) - all problems resolved or deleted     Past Medical History:   Diagnosis Date    Bipolar 1 disorder ([facility] [address] )     Schizo-affective psychosis ([facility] )      Social History     Socioeconomic History    Marital status: Single     Spouse name: Not on file    Number of children: Not on file    Years of education: Not on file    Highest education level: Not on file   Occupational History    Not on file   Social Needs    Financial resource strain: Not on file    Food insecurity:     Worry: Not on file     Inability: Not on file    Transportation needs:     Medical: Not on file     Non-medical: Not on file   Tobacco Use    Smoking status: Heavy Tobacco Smoker     Packs/day: 1 50     Types: Cigarettes    Smokeless tobacco: Never Used   Substance and Sexual Activity    Alcohol use: Not Currently    Drug use: Never    Sexual activity: Not on file   Lifestyle    Physical activity:     Days per week: Not on file     Minutes per session: Not on file    Stress: Not on file   Relationships    Social connections:     Talks on phone: Not on file     Gets together: Not on file     Attends Advent service: Not on file     Active member of club or organization: Not on file     Attends meetings of clubs or organizations: Not on file     Relationship status: Not on file    Intimate partner violence:     Fear of current or ex partner: Not on file     Emotionally abused: Not on file     Physically abused: Not on file     Forced sexual activity: Not on file   Other Topics Concern    Not on file   Social History Narrative    Not on file      Family History   Problem Relation Age of Onset    Heart disease Mother     Heart disease Father     Multiple sclerosis Sister      Past Surgical History:   Procedure Laterality Date    BACK SURGERY      CHOLECYSTECTOMY      KNEE SURGERY Right        Current Outpatient Medications:     aspirin 81 mg chewable tablet, Chew 1 tablet (81 mg total) daily, Disp: 90 tablet, Rfl: 3    atorvastatin (LIPITOR) 80 mg tablet, Take 1 tablet (80 mg total) by mouth every evening, Disp: 90 tablet, Rfl: 3    clopidogrel (PLAVIX) 75 mg tablet, Take 1 tablet (75 mg total) by mouth daily, Disp: 90 tablet, Rfl: 3    metoprolol tartrate (LOPRESSOR) 25 mg tablet, Take 0 5 tablets (12 5 mg total) by mouth every 12 (twelve) hours, Disp: 90 tablet, Rfl: 1    nitroglycerin (NITROSTAT) 0 4 mg SL tablet, Place 1 tablet (0 4 mg total) under the tongue every 5 (five) minutes as needed for chest pain, Disp: 10 tablet, Rfl: 0    sildenafil (VIAGRA) 100 mg tablet, Take 100 mg by mouth daily as needed for erectile dysfunction, Disp: , Rfl:     topiramate (TOPAMAX) 100 mg tablet, Take 100 mg by mouth 3 (three) times a day, Disp: , Rfl:     ziprasidone (GEODON) 40 mg capsule, Take 40 mg by mouth 2 (two) times a day, Disp: , Rfl:     clonazePAM (KlonoPIN) 1 mg tablet, , Disp: , Rfl:     Topiramate (TOPAMAX PO), Take by mouth, Disp: , Rfl:     ziprasidone (GEODON) 20 mg capsule, Take 20 mg by mouth 2 (two) times a day with meals, Disp: , Rfl:   No current facility-administered medications for this visit  Allergies   Allergen Reactions    Fish-Derived Products        Review of Systems:  Review of Systems   Constitutional: Negative  HENT: Negative  Eyes: Negative  Respiratory: Positive for shortness of breath  Cardiovascular: Negative  Gastrointestinal: Negative  Endocrine: Negative  Genitourinary: Negative  Musculoskeletal: Negative  Skin: Negative  Allergic/Immunologic: Negative  Neurological: Negative  Hematological: Negative  Psychiatric/Behavioral: Positive for behavioral problems, decreased concentration and dysphoric mood  Vitals:    08/16/19 1256   BP: 118/70   BP Location: Left arm   Patient Position: Sitting   Cuff Size: Large   Pulse: 78   SpO2: 98%   Weight: 105 kg (232 lb)   Height: 5' 9" (1 753 m)     Physical Exam:  Physical Exam   Constitutional: He is oriented to person, place, and time  He appears well-developed and well-nourished  No distress  HENT:   Head: Normocephalic and atraumatic  Right Ear: External ear normal    Left Ear: External ear normal    Eyes: Pupils are equal, round, and reactive to light  Conjunctivae are normal  Right eye exhibits no discharge  Left eye exhibits no discharge  No scleral icterus  Neck: Normal range of motion  Neck supple  No JVD present  No tracheal deviation present  No thyromegaly present  Cardiovascular: Normal rate and regular rhythm  Exam reveals gallop  Exam reveals no friction rub  No murmur heard  Pulmonary/Chest: No stridor  Apnea noted  No respiratory distress  He has no wheezes  He has no rales  He exhibits no tenderness  Poor force of expiration   Abdominal: Soft  Bowel sounds are normal  He exhibits no distension and no mass  There is no tenderness  There is no rebound and no guarding  Musculoskeletal: Normal range of motion  He exhibits no edema, tenderness or deformity  Neurological: He is alert and oriented to person, place, and time  He has normal reflexes  He displays normal reflexes  No cranial nerve deficit  He exhibits normal muscle tone  Coordination normal    Skin: Skin is warm and dry  No rash noted  He is not diaphoretic  No erythema  No pallor  Psychiatric: He has a normal mood and affect  His behavior is normal  Judgment and thought content normal        Labs:     Lab Results   Component Value Date    WBC 12 05 (H) 08/15/2019    HGB 15 5 08/15/2019    HCT 47 5 08/15/2019    MCV 99 (H) 08/15/2019     08/15/2019     Lab Results   Component Value Date    K 3 5 08/15/2019     08/15/2019    CO2 26 08/15/2019    BUN 6 08/15/2019    CREATININE 0 84 08/15/2019    CALCIUM 8 6 08/15/2019    AST 24 08/15/2019    ALT 25 08/15/2019    ALKPHOS 65 08/15/2019    EGFR 95 08/15/2019     No results found for: CHOL  Lab Results   Component Value Date    HDL 42 08/14/2019     Lab Results   Component Value Date    LDLCALC 85 08/14/2019     Lab Results   Component Value Date    TRIG 75 08/14/2019     Lab Results   Component Value Date    HGBA1C 5 6 08/14/2019       Imaging & Testing   I have personally reviewed pertinent reports  EKG: Personally reviewed      Normal sinus rhythm deep t-wave inversion    Cardiac testing:   Results for orders placed during the hospital encounter of 08/13/19   Echo complete with contrast if indicated    Narrative [address]  [facility], [address] [facility]  [phone]    Transthoracic Echocardiogram  2D, M-mode, Doppler, and Color Doppler    Study date:  14-Aug-2019    Patient: Chyna Hanson  MR number: [id number]  Account number: [de-identified]  : 1955  Age: 66 years  Gender: Male  Status: Inpatient  Location: Bedside  Height: 70 in  Weight: 232 5 lb  BP: 121/ 74 mmHg    Indications: Myocardial infarction  Diagnoses: I25 119 - Atherosclerotic heart disease of native coronary artery with unspecified angina pectoris    Sonographer:  Bogdan Hinds RDCS  Referring Physician:  Jeanne Boateng MD  Group:  [facility]  Cardiology Fellow:  Alleen Kussmaul, MD  Interpreting Physician:  Kylah Melendrez MD    SUMMARY    LEFT VENTRICLE:  Systolic function was normal  Ejection fraction was estimated to be 55 %  There was possible hypokinesis of the apical wall(s)  AORTIC VALVE:  There was mild regurgitation  HISTORY: PRIOR HISTORY: Heavy smoker, bipolar disorder, schizo-affective psychosis  PROCEDURE: The procedure was performed at the bedside  This was a routine study  The transthoracic approach was used  The study included complete 2D imaging, M-mode, complete spectral Doppler, and color Doppler  The heart rate was 48 bpm,  at the start of the study  Images were obtained from the parasternal, apical, subcostal, and suprasternal notch acoustic windows  Image quality was adequate  LEFT VENTRICLE: Size was normal  Systolic function was normal  Ejection fraction was estimated to be 55 %  There was possible hypokinesis of the apical wall(s)  Wall thickness was normal  DOPPLER: The ratio of early ventricular filling to  atrial contraction velocities was within the normal range  RIGHT VENTRICLE: The size was normal  Systolic function was normal     LEFT ATRIUM: The atrium was mildly dilated      RIGHT ATRIUM: Size was normal     MITRAL VALVE: Valve structure was normal  There was normal leaflet separation  DOPPLER: There was no evidence for stenosis  There was trace regurgitation  AORTIC VALVE: The valve was trileaflet  Leaflets exhibited mildly increased thickness and normal cuspal separation  DOPPLER: There was no evidence for stenosis  There was mild regurgitation  TRICUSPID VALVE: The valve structure was normal  There was normal leaflet separation  DOPPLER: There was no evidence for stenosis  There was trace regurgitation  PULMONIC VALVE: Leaflets exhibited normal thickness, no calcification, and normal cuspal separation  DOPPLER: The transpulmonic velocity was within the normal range  There was no significant regurgitation  PERICARDIUM: There was no pericardial effusion  The pericardium was normal in appearance  AORTA: The root exhibited normal size  SYSTEMIC VEINS: IVC: The inferior vena cava was normal in size  SYSTEM MEASUREMENT TABLES    2D  %FS: 26 16 %  Ao Diam: 3 53 cm  EDV(Teich): 190 01 ml  EF(Teich): 50 41 %  ESV(Teich): 94 22 ml  IVSd: 0 79 cm  LA Area: 21 81 cm2  LA Diam: 4 22 cm  LVEDV MOD A4C: 153 33 ml  LVEF MOD A4C: 51 31 %  LVESV MOD A4C: 74 66 ml  LVIDd: 6 14 cm  LVIDs: 4 54 cm  LVLd A4C: 8 84 cm  LVLs A4C: 7 94 cm  LVPWd: 0 83 cm  RA Area: 17 23 cm2  RVIDd: 3 cm  SV MOD A4C: 78 67 ml  SV(Teich): 95 78 ml    MM  TAPSE: 2 2 cm    PW  E': 0 06 m/s  E/E': 13 36  MV A Moises: 0 68 m/s  MV Dec [place]: 3 38 m/s2  MV DecT: 245 06 ms  MV E Moises: 0 83 m/s  MV E/A Ratio: 1 21  MV PHT: 71 07 ms  MVA By PHT: 3 1 cm2    [employer] Accredited Echocardiography Laboratory    Prepared and electronically signed by    Kristopher Lay MD  Signed 15-Aug-2019 08:55:39       No results found for this or any previous visit          Dann Easley  Please call with any questions or suggestions    A description of the counseling:   Goals and Barriers:  Patient's ability to self care:  Medication side effect reviewed with patient in detail and all their questions answered  "This note has been constructed using a voice recognition system  Therefore there may be syntax, spelling, and/or grammatical errors   Please call if you have any questions  "

## 2019-08-20 ENCOUNTER — TELEPHONE (OUTPATIENT)
Dept: CARDIOLOGY CLINIC | Facility: CLINIC | Age: 64
End: 2019-08-20

## 2019-08-20 NOTE — TELEPHONE ENCOUNTER
Patient will need a letter clearing him to return to work  He will need it by this Friday 8/23  Please call patient to notify him when letter is completed

## 2019-08-20 NOTE — LETTER
August 20, 2019    [address] [place] [id number]      To Whom It May Concern:    Lenka Polanco is under my professional care and was last seen in our office on 8/16/2019  From a medical standpoint, he is cleared to return to work starting immediately  Please call the cardiac office with any questions or concerns at [phone]           Sincerely,        Hamzah Chris MD

## 2019-11-05 ENCOUNTER — HOSPITAL ENCOUNTER (OUTPATIENT)
Facility: HOSPITAL | Age: 64
Setting detail: OBSERVATION
End: 2019-11-06
Attending: EMERGENCY MEDICINE | Admitting: INTERNAL MEDICINE
Payer: MEDICARE

## 2019-11-05 ENCOUNTER — APPOINTMENT (EMERGENCY)
Dept: RADIOLOGY | Facility: HOSPITAL | Age: 64
End: 2019-11-05
Payer: MEDICARE

## 2019-11-05 DIAGNOSIS — I73.9 PAD (PERIPHERAL ARTERY DISEASE) (HCC): ICD-10-CM

## 2019-11-05 DIAGNOSIS — R07.9 CHEST PAIN: Primary | ICD-10-CM

## 2019-11-05 PROBLEM — E87.6 HYPOKALEMIA: Status: ACTIVE | Noted: 2019-11-05

## 2019-11-05 PROBLEM — I25.110 UNSTABLE ANGINA PECTORIS DUE TO CORONARY ARTERIOSCLEROSIS (HCC): Status: ACTIVE | Noted: 2019-11-05

## 2019-11-05 LAB
ALBUMIN SERPL BCP-MCNC: 3.1 G/DL (ref 3.5–5)
ALP SERPL-CCNC: 75 U/L (ref 46–116)
ALT SERPL W P-5'-P-CCNC: 20 U/L (ref 12–78)
ANION GAP SERPL CALCULATED.3IONS-SCNC: 11 MMOL/L (ref 4–13)
AST SERPL W P-5'-P-CCNC: 34 U/L (ref 5–45)
BASOPHILS # BLD AUTO: 0.04 THOUSANDS/ΜL (ref 0–0.1)
BASOPHILS NFR BLD AUTO: 0 % (ref 0–1)
BILIRUB SERPL-MCNC: 0.4 MG/DL (ref 0.2–1)
BUN SERPL-MCNC: 6 MG/DL (ref 5–25)
CALCIUM SERPL-MCNC: 8.8 MG/DL (ref 8.3–10.1)
CHLORIDE SERPL-SCNC: 102 MMOL/L (ref 100–108)
CO2 SERPL-SCNC: 23 MMOL/L (ref 21–32)
CREAT SERPL-MCNC: 1.09 MG/DL (ref 0.6–1.3)
EOSINOPHIL # BLD AUTO: 0.05 THOUSAND/ΜL (ref 0–0.61)
EOSINOPHIL NFR BLD AUTO: 0 % (ref 0–6)
ERYTHROCYTE [DISTWIDTH] IN BLOOD BY AUTOMATED COUNT: 13.7 % (ref 11.6–15.1)
GFR SERPL CREATININE-BSD FRML MDRD: 72 ML/MIN/1.73SQ M
GLUCOSE SERPL-MCNC: 107 MG/DL (ref 65–140)
HCT VFR BLD AUTO: 47.6 % (ref 36.5–49.3)
HGB BLD-MCNC: 16 G/DL (ref 12–17)
IMM GRANULOCYTES # BLD AUTO: 0.05 THOUSAND/UL (ref 0–0.2)
IMM GRANULOCYTES NFR BLD AUTO: 0 % (ref 0–2)
LIPASE SERPL-CCNC: 34 U/L (ref 73–393)
LYMPHOCYTES # BLD AUTO: 1.8 THOUSANDS/ΜL (ref 0.6–4.47)
LYMPHOCYTES NFR BLD AUTO: 12 % (ref 14–44)
MAGNESIUM SERPL-MCNC: 2.1 MG/DL (ref 1.6–2.6)
MCH RBC QN AUTO: 33.1 PG (ref 26.8–34.3)
MCHC RBC AUTO-ENTMCNC: 33.6 G/DL (ref 31.4–37.4)
MCV RBC AUTO: 99 FL (ref 82–98)
MONOCYTES # BLD AUTO: 0.7 THOUSAND/ΜL (ref 0.17–1.22)
MONOCYTES NFR BLD AUTO: 5 % (ref 4–12)
NEUTROPHILS # BLD AUTO: 12.66 THOUSANDS/ΜL (ref 1.85–7.62)
NEUTS SEG NFR BLD AUTO: 83 % (ref 43–75)
NRBC BLD AUTO-RTO: 0 /100 WBCS
NT-PROBNP SERPL-MCNC: 253 PG/ML
PLATELET # BLD AUTO: 247 THOUSANDS/UL (ref 149–390)
PMV BLD AUTO: 10.2 FL (ref 8.9–12.7)
POTASSIUM SERPL-SCNC: 3.3 MMOL/L (ref 3.5–5.3)
PROT SERPL-MCNC: 7.1 G/DL (ref 6.4–8.2)
RBC # BLD AUTO: 4.83 MILLION/UL (ref 3.88–5.62)
SODIUM SERPL-SCNC: 136 MMOL/L (ref 136–145)
TROPONIN I SERPL-MCNC: 1.15 NG/ML
TROPONIN I SERPL-MCNC: 4.89 NG/ML
TSH SERPL DL<=0.05 MIU/L-ACNC: 1.6 UIU/ML (ref 0.36–3.74)
WBC # BLD AUTO: 15.3 THOUSAND/UL (ref 4.31–10.16)

## 2019-11-05 PROCEDURE — 99220 PR INITIAL OBSERVATION CARE/DAY 70 MINUTES: CPT | Performed by: INTERNAL MEDICINE

## 2019-11-05 PROCEDURE — 71045 X-RAY EXAM CHEST 1 VIEW: CPT

## 2019-11-05 PROCEDURE — 71275 CT ANGIOGRAPHY CHEST: CPT

## 2019-11-05 PROCEDURE — 83880 ASSAY OF NATRIURETIC PEPTIDE: CPT | Performed by: EMERGENCY MEDICINE

## 2019-11-05 PROCEDURE — 99285 EMERGENCY DEPT VISIT HI MDM: CPT

## 2019-11-05 PROCEDURE — 84443 ASSAY THYROID STIM HORMONE: CPT | Performed by: INTERNAL MEDICINE

## 2019-11-05 PROCEDURE — 84484 ASSAY OF TROPONIN QUANT: CPT | Performed by: INTERNAL MEDICINE

## 2019-11-05 PROCEDURE — 96374 THER/PROPH/DIAG INJ IV PUSH: CPT

## 2019-11-05 PROCEDURE — 87081 CULTURE SCREEN ONLY: CPT | Performed by: INTERNAL MEDICINE

## 2019-11-05 PROCEDURE — 83735 ASSAY OF MAGNESIUM: CPT | Performed by: EMERGENCY MEDICINE

## 2019-11-05 PROCEDURE — 84484 ASSAY OF TROPONIN QUANT: CPT | Performed by: EMERGENCY MEDICINE

## 2019-11-05 PROCEDURE — 83690 ASSAY OF LIPASE: CPT | Performed by: EMERGENCY MEDICINE

## 2019-11-05 PROCEDURE — 80053 COMPREHEN METABOLIC PANEL: CPT | Performed by: EMERGENCY MEDICINE

## 2019-11-05 PROCEDURE — 93005 ELECTROCARDIOGRAM TRACING: CPT

## 2019-11-05 PROCEDURE — 36415 COLL VENOUS BLD VENIPUNCTURE: CPT | Performed by: EMERGENCY MEDICINE

## 2019-11-05 PROCEDURE — 85025 COMPLETE CBC W/AUTO DIFF WBC: CPT | Performed by: EMERGENCY MEDICINE

## 2019-11-05 PROCEDURE — 74175 CTA ABDOMEN W/CONTRAST: CPT

## 2019-11-05 RX ORDER — DOCUSATE SODIUM 100 MG/1
100 CAPSULE, LIQUID FILLED ORAL 2 TIMES DAILY PRN
Status: DISCONTINUED | OUTPATIENT
Start: 2019-11-05 | End: 2019-11-06 | Stop reason: HOSPADM

## 2019-11-05 RX ORDER — DIPHENHYDRAMINE HCL 25 MG
25 TABLET ORAL EVERY 6 HOURS PRN
Status: DISCONTINUED | OUTPATIENT
Start: 2019-11-05 | End: 2019-11-06 | Stop reason: HOSPADM

## 2019-11-05 RX ORDER — ASPIRIN 81 MG/1
324 TABLET, CHEWABLE ORAL ONCE
Status: COMPLETED | OUTPATIENT
Start: 2019-11-05 | End: 2019-11-05

## 2019-11-05 RX ORDER — LEVALBUTEROL 1.25 MG/.5ML
1.25 SOLUTION, CONCENTRATE RESPIRATORY (INHALATION) EVERY 6 HOURS PRN
Status: DISCONTINUED | OUTPATIENT
Start: 2019-11-05 | End: 2019-11-06 | Stop reason: HOSPADM

## 2019-11-05 RX ORDER — FENTANYL CITRATE 50 UG/ML
100 INJECTION, SOLUTION INTRAMUSCULAR; INTRAVENOUS ONCE
Status: COMPLETED | OUTPATIENT
Start: 2019-11-05 | End: 2019-11-05

## 2019-11-05 RX ORDER — OXYCODONE HYDROCHLORIDE 5 MG/1
5 TABLET ORAL EVERY 4 HOURS PRN
Status: DISCONTINUED | OUTPATIENT
Start: 2019-11-05 | End: 2019-11-06 | Stop reason: HOSPADM

## 2019-11-05 RX ORDER — TOPIRAMATE 100 MG/1
100 TABLET, FILM COATED ORAL 3 TIMES DAILY
Status: DISCONTINUED | OUTPATIENT
Start: 2019-11-05 | End: 2019-11-06 | Stop reason: HOSPADM

## 2019-11-05 RX ORDER — ONDANSETRON 2 MG/ML
4 INJECTION INTRAMUSCULAR; INTRAVENOUS EVERY 4 HOURS PRN
Status: DISCONTINUED | OUTPATIENT
Start: 2019-11-05 | End: 2019-11-06 | Stop reason: HOSPADM

## 2019-11-05 RX ORDER — NITROGLYCERIN 0.4 MG/1
0.4 TABLET SUBLINGUAL ONCE
Status: DISCONTINUED | OUTPATIENT
Start: 2019-11-05 | End: 2019-11-05

## 2019-11-05 RX ORDER — ATORVASTATIN CALCIUM 80 MG/1
80 TABLET, FILM COATED ORAL EVERY EVENING
Status: DISCONTINUED | OUTPATIENT
Start: 2019-11-06 | End: 2019-11-06 | Stop reason: HOSPADM

## 2019-11-05 RX ORDER — HYDRALAZINE HYDROCHLORIDE 20 MG/ML
10 INJECTION INTRAMUSCULAR; INTRAVENOUS EVERY 4 HOURS PRN
Status: DISCONTINUED | OUTPATIENT
Start: 2019-11-05 | End: 2019-11-06 | Stop reason: HOSPADM

## 2019-11-05 RX ORDER — CLOPIDOGREL BISULFATE 75 MG/1
75 TABLET ORAL DAILY
Status: DISCONTINUED | OUTPATIENT
Start: 2019-11-06 | End: 2019-11-06 | Stop reason: HOSPADM

## 2019-11-05 RX ORDER — NICOTINE 21 MG/24HR
1 PATCH, TRANSDERMAL 24 HOURS TRANSDERMAL DAILY
Status: DISCONTINUED | OUTPATIENT
Start: 2019-11-05 | End: 2019-11-06 | Stop reason: HOSPADM

## 2019-11-05 RX ORDER — LORAZEPAM 2 MG/ML
0.5 INJECTION INTRAMUSCULAR EVERY 4 HOURS PRN
Status: DISCONTINUED | OUTPATIENT
Start: 2019-11-05 | End: 2019-11-06

## 2019-11-05 RX ORDER — ASPIRIN 81 MG/1
81 TABLET, CHEWABLE ORAL DAILY
Status: DISCONTINUED | OUTPATIENT
Start: 2019-11-06 | End: 2019-11-06 | Stop reason: HOSPADM

## 2019-11-05 RX ORDER — NITROGLYCERIN 0.4 MG/1
0.4 TABLET SUBLINGUAL
Status: DISCONTINUED | OUTPATIENT
Start: 2019-11-05 | End: 2019-11-06 | Stop reason: HOSPADM

## 2019-11-05 RX ORDER — ZIPRASIDONE HYDROCHLORIDE 40 MG/1
40 CAPSULE ORAL 2 TIMES DAILY
Status: DISCONTINUED | OUTPATIENT
Start: 2019-11-05 | End: 2019-11-06 | Stop reason: HOSPADM

## 2019-11-05 RX ADMIN — ENOXAPARIN SODIUM 105 MG: 120 INJECTION SUBCUTANEOUS at 21:59

## 2019-11-05 RX ADMIN — IOHEXOL 100 ML: 350 INJECTION, SOLUTION INTRAVENOUS at 21:03

## 2019-11-05 RX ADMIN — ASPIRIN 81 MG 324 MG: 81 TABLET ORAL at 21:58

## 2019-11-05 RX ADMIN — FENTANYL CITRATE 100 MCG: 50 INJECTION, SOLUTION INTRAMUSCULAR; INTRAVENOUS at 21:20

## 2019-11-05 RX ADMIN — ZIPRASIDONE HYDROCHLORIDE 40 MG: 40 CAPSULE ORAL at 22:49

## 2019-11-05 RX ADMIN — TOPIRAMATE 100 MG: 100 TABLET, FILM COATED ORAL at 22:49

## 2019-11-06 ENCOUNTER — HOSPITAL ENCOUNTER (INPATIENT)
Facility: HOSPITAL | Age: 64
LOS: 2 days | Discharge: HOME | DRG: 281 | End: 2019-11-08
Attending: FAMILY MEDICINE | Admitting: INTERNAL MEDICINE
Payer: MEDICARE

## 2019-11-06 VITALS
TEMPERATURE: 98 F | WEIGHT: 241 LBS | DIASTOLIC BLOOD PRESSURE: 44 MMHG | SYSTOLIC BLOOD PRESSURE: 112 MMHG | HEART RATE: 56 BPM | HEIGHT: 69 IN | RESPIRATION RATE: 18 BRPM | OXYGEN SATURATION: 93 % | BODY MASS INDEX: 35.7 KG/M2

## 2019-11-06 DIAGNOSIS — I21.4 NSTEMI (NON-ST ELEVATED MYOCARDIAL INFARCTION) (HCC): Primary | ICD-10-CM

## 2019-11-06 DIAGNOSIS — I25.10 CAD (CORONARY ARTERY DISEASE): ICD-10-CM

## 2019-11-06 DIAGNOSIS — L60.2 LONG TOENAIL: ICD-10-CM

## 2019-11-06 DIAGNOSIS — I73.9 PAD (PERIPHERAL ARTERY DISEASE) (HCC): ICD-10-CM

## 2019-11-06 DIAGNOSIS — I20.1 CORONARY ARTERY SPASM (HCC): ICD-10-CM

## 2019-11-06 PROBLEM — I74.5 EMBOLISM AND THROMBOSIS OF ILIAC ARTERY (HCC): Status: ACTIVE | Noted: 2019-11-05

## 2019-11-06 PROBLEM — E87.6 HYPOKALEMIA: Status: RESOLVED | Noted: 2019-11-05 | Resolved: 2019-11-06

## 2019-11-06 LAB
ALBUMIN SERPL BCP-MCNC: 3 G/DL (ref 3.5–5)
ALP SERPL-CCNC: 69 U/L (ref 46–116)
ALT SERPL W P-5'-P-CCNC: 17 U/L (ref 12–78)
ANION GAP SERPL CALCULATED.3IONS-SCNC: 7 MMOL/L (ref 4–13)
AST SERPL W P-5'-P-CCNC: 25 U/L (ref 5–45)
ATRIAL RATE: 56 BPM
ATRIAL RATE: 56 BPM
ATRIAL RATE: 80 BPM
BILIRUB SERPL-MCNC: 0.6 MG/DL (ref 0.2–1)
BUN SERPL-MCNC: 5 MG/DL (ref 5–25)
CALCIUM SERPL-MCNC: 8.7 MG/DL (ref 8.3–10.1)
CHLORIDE SERPL-SCNC: 103 MMOL/L (ref 100–108)
CHOLEST SERPL-MCNC: 145 MG/DL (ref 50–200)
CO2 SERPL-SCNC: 26 MMOL/L (ref 21–32)
CREAT SERPL-MCNC: 0.85 MG/DL (ref 0.6–1.3)
ERYTHROCYTE [DISTWIDTH] IN BLOOD BY AUTOMATED COUNT: 13.8 % (ref 11.6–15.1)
GFR SERPL CREATININE-BSD FRML MDRD: 93 ML/MIN/1.73SQ M
GLUCOSE P FAST SERPL-MCNC: 94 MG/DL (ref 65–99)
GLUCOSE SERPL-MCNC: 94 MG/DL (ref 65–140)
HCT VFR BLD AUTO: 47.9 % (ref 36.5–49.3)
HDLC SERPL-MCNC: 41 MG/DL
HGB BLD-MCNC: 15.8 G/DL (ref 12–17)
LDLC SERPL CALC-MCNC: 90 MG/DL (ref 0–100)
MCH RBC QN AUTO: 33.3 PG (ref 26.8–34.3)
MCHC RBC AUTO-ENTMCNC: 33 G/DL (ref 31.4–37.4)
MCV RBC AUTO: 101 FL (ref 82–98)
NONHDLC SERPL-MCNC: 104 MG/DL
P AXIS: 37 DEGREES
P AXIS: 51 DEGREES
P AXIS: 54 DEGREES
PLATELET # BLD AUTO: 234 THOUSANDS/UL (ref 149–390)
PMV BLD AUTO: 10.1 FL (ref 8.9–12.7)
POTASSIUM SERPL-SCNC: 3.9 MMOL/L (ref 3.5–5.3)
PR INTERVAL: 132 MS
PR INTERVAL: 138 MS
PR INTERVAL: 140 MS
PROT SERPL-MCNC: 6.8 G/DL (ref 6.4–8.2)
QRS AXIS: -14 DEGREES
QRS AXIS: -14 DEGREES
QRS AXIS: -39 DEGREES
QRSD INTERVAL: 74 MS
QRSD INTERVAL: 74 MS
QRSD INTERVAL: 80 MS
QT INTERVAL: 368 MS
QT INTERVAL: 426 MS
QT INTERVAL: 452 MS
QTC INTERVAL: 411 MS
QTC INTERVAL: 424 MS
QTC INTERVAL: 436 MS
RBC # BLD AUTO: 4.74 MILLION/UL (ref 3.88–5.62)
SODIUM SERPL-SCNC: 136 MMOL/L (ref 136–145)
T WAVE AXIS: 0 DEGREES
T WAVE AXIS: 2 DEGREES
T WAVE AXIS: 8 DEGREES
TRIGL SERPL-MCNC: 71 MG/DL
TROPONIN I SERPL-MCNC: 4.38 NG/ML
TROPONIN I SERPL-MCNC: 5.49 NG/ML
TROPONIN I SERPL-MCNC: 6.07 NG/ML
VENTRICULAR RATE: 56 BPM
VENTRICULAR RATE: 56 BPM
VENTRICULAR RATE: 80 BPM
WBC # BLD AUTO: 9.17 THOUSAND/UL (ref 4.31–10.16)

## 2019-11-06 PROCEDURE — G8978 MOBILITY CURRENT STATUS: HCPCS

## 2019-11-06 PROCEDURE — G8979 MOBILITY GOAL STATUS: HCPCS

## 2019-11-06 PROCEDURE — 80061 LIPID PANEL: CPT | Performed by: INTERNAL MEDICINE

## 2019-11-06 PROCEDURE — 97161 PT EVAL LOW COMPLEX 20 MIN: CPT

## 2019-11-06 PROCEDURE — 84484 ASSAY OF TROPONIN QUANT: CPT | Performed by: INTERNAL MEDICINE

## 2019-11-06 PROCEDURE — 93005 ELECTROCARDIOGRAM TRACING: CPT

## 2019-11-06 PROCEDURE — 93010 ELECTROCARDIOGRAM REPORT: CPT | Performed by: INTERNAL MEDICINE

## 2019-11-06 PROCEDURE — G8980 MOBILITY D/C STATUS: HCPCS

## 2019-11-06 PROCEDURE — 80053 COMPREHEN METABOLIC PANEL: CPT | Performed by: INTERNAL MEDICINE

## 2019-11-06 PROCEDURE — 84484 ASSAY OF TROPONIN QUANT: CPT | Performed by: FAMILY MEDICINE

## 2019-11-06 PROCEDURE — 99217 PR OBSERVATION CARE DISCHARGE MANAGEMENT: CPT | Performed by: FAMILY MEDICINE

## 2019-11-06 PROCEDURE — 85027 COMPLETE CBC AUTOMATED: CPT | Performed by: INTERNAL MEDICINE

## 2019-11-06 PROCEDURE — 99223 1ST HOSP IP/OBS HIGH 75: CPT | Performed by: INTERNAL MEDICINE

## 2019-11-06 PROCEDURE — 99215 OFFICE O/P EST HI 40 MIN: CPT | Performed by: INTERNAL MEDICINE

## 2019-11-06 RX ORDER — ONDANSETRON 2 MG/ML
4 INJECTION INTRAMUSCULAR; INTRAVENOUS EVERY 4 HOURS PRN
Status: DISCONTINUED | OUTPATIENT
Start: 2019-11-06 | End: 2019-11-08 | Stop reason: HOSPADM

## 2019-11-06 RX ORDER — LEVALBUTEROL 1.25 MG/.5ML
1.25 SOLUTION, CONCENTRATE RESPIRATORY (INHALATION) EVERY 6 HOURS PRN
Status: CANCELLED | OUTPATIENT
Start: 2019-11-06

## 2019-11-06 RX ORDER — NITROGLYCERIN 0.4 MG/1
0.4 TABLET SUBLINGUAL
Status: CANCELLED | OUTPATIENT
Start: 2019-11-06

## 2019-11-06 RX ORDER — HYDRALAZINE HYDROCHLORIDE 20 MG/ML
10 INJECTION INTRAMUSCULAR; INTRAVENOUS EVERY 4 HOURS PRN
Status: DISCONTINUED | OUTPATIENT
Start: 2019-11-06 | End: 2019-11-06

## 2019-11-06 RX ORDER — DIPHENHYDRAMINE HCL 25 MG
25 TABLET ORAL EVERY 6 HOURS PRN
Status: DISCONTINUED | OUTPATIENT
Start: 2019-11-06 | End: 2019-11-08 | Stop reason: HOSPADM

## 2019-11-06 RX ORDER — ONDANSETRON 2 MG/ML
4 INJECTION INTRAMUSCULAR; INTRAVENOUS EVERY 4 HOURS PRN
Status: CANCELLED | OUTPATIENT
Start: 2019-11-06

## 2019-11-06 RX ORDER — CLOPIDOGREL BISULFATE 75 MG/1
75 TABLET ORAL DAILY
Status: CANCELLED | OUTPATIENT
Start: 2019-11-07

## 2019-11-06 RX ORDER — NICOTINE 21 MG/24HR
1 PATCH, TRANSDERMAL 24 HOURS TRANSDERMAL DAILY
Status: DISCONTINUED | OUTPATIENT
Start: 2019-11-07 | End: 2019-11-07

## 2019-11-06 RX ORDER — OXYCODONE HYDROCHLORIDE 5 MG/1
5 TABLET ORAL EVERY 4 HOURS PRN
Status: CANCELLED | OUTPATIENT
Start: 2019-11-06

## 2019-11-06 RX ORDER — OXYCODONE HYDROCHLORIDE 5 MG/1
5 TABLET ORAL EVERY 4 HOURS PRN
Status: DISCONTINUED | OUTPATIENT
Start: 2019-11-06 | End: 2019-11-08 | Stop reason: HOSPADM

## 2019-11-06 RX ORDER — ATORVASTATIN CALCIUM 80 MG/1
80 TABLET, FILM COATED ORAL EVERY EVENING
Status: DISCONTINUED | OUTPATIENT
Start: 2019-11-06 | End: 2019-11-08 | Stop reason: HOSPADM

## 2019-11-06 RX ORDER — LEVALBUTEROL 1.25 MG/.5ML
1.25 SOLUTION, CONCENTRATE RESPIRATORY (INHALATION) EVERY 6 HOURS PRN
Status: DISCONTINUED | OUTPATIENT
Start: 2019-11-06 | End: 2019-11-08 | Stop reason: HOSPADM

## 2019-11-06 RX ORDER — ZIPRASIDONE HYDROCHLORIDE 40 MG/1
40 CAPSULE ORAL 2 TIMES DAILY
Status: DISCONTINUED | OUTPATIENT
Start: 2019-11-06 | End: 2019-11-08 | Stop reason: HOSPADM

## 2019-11-06 RX ORDER — LORAZEPAM 1 MG/1
1 TABLET ORAL EVERY 8 HOURS PRN
Status: DISCONTINUED | OUTPATIENT
Start: 2019-11-06 | End: 2019-11-08 | Stop reason: HOSPADM

## 2019-11-06 RX ORDER — DOCUSATE SODIUM 100 MG/1
100 CAPSULE, LIQUID FILLED ORAL 2 TIMES DAILY PRN
Status: DISCONTINUED | OUTPATIENT
Start: 2019-11-06 | End: 2019-11-08 | Stop reason: HOSPADM

## 2019-11-06 RX ORDER — ATORVASTATIN CALCIUM 80 MG/1
80 TABLET, FILM COATED ORAL EVERY EVENING
Status: CANCELLED | OUTPATIENT
Start: 2019-11-06

## 2019-11-06 RX ORDER — NICOTINE 21 MG/24HR
1 PATCH, TRANSDERMAL 24 HOURS TRANSDERMAL DAILY
Status: CANCELLED | OUTPATIENT
Start: 2019-11-07

## 2019-11-06 RX ORDER — LORAZEPAM 2 MG/ML
0.5 INJECTION INTRAMUSCULAR EVERY 4 HOURS PRN
Status: CANCELLED | OUTPATIENT
Start: 2019-11-06

## 2019-11-06 RX ORDER — DIPHENHYDRAMINE HCL 25 MG
25 TABLET ORAL EVERY 6 HOURS PRN
Status: CANCELLED | OUTPATIENT
Start: 2019-11-06

## 2019-11-06 RX ORDER — ZIPRASIDONE HYDROCHLORIDE 40 MG/1
40 CAPSULE ORAL 2 TIMES DAILY
Status: CANCELLED | OUTPATIENT
Start: 2019-11-06

## 2019-11-06 RX ORDER — ASPIRIN 81 MG/1
81 TABLET, CHEWABLE ORAL DAILY
Status: CANCELLED | OUTPATIENT
Start: 2019-11-07

## 2019-11-06 RX ORDER — DOCUSATE SODIUM 100 MG/1
100 CAPSULE, LIQUID FILLED ORAL 2 TIMES DAILY PRN
Status: CANCELLED | OUTPATIENT
Start: 2019-11-06

## 2019-11-06 RX ORDER — LORAZEPAM 1 MG/1
1 TABLET ORAL EVERY 8 HOURS PRN
Status: CANCELLED | OUTPATIENT
Start: 2019-11-06

## 2019-11-06 RX ORDER — ACETAMINOPHEN 325 MG/1
650 TABLET ORAL EVERY 6 HOURS PRN
Status: DISCONTINUED | OUTPATIENT
Start: 2019-11-06 | End: 2019-11-08 | Stop reason: HOSPADM

## 2019-11-06 RX ORDER — NITROGLYCERIN 0.4 MG/1
0.4 TABLET SUBLINGUAL
Status: DISCONTINUED | OUTPATIENT
Start: 2019-11-06 | End: 2019-11-08 | Stop reason: HOSPADM

## 2019-11-06 RX ORDER — LORAZEPAM 1 MG/1
1 TABLET ORAL EVERY 8 HOURS PRN
Status: DISCONTINUED | OUTPATIENT
Start: 2019-11-06 | End: 2019-11-06 | Stop reason: HOSPADM

## 2019-11-06 RX ORDER — ASPIRIN 81 MG/1
81 TABLET, CHEWABLE ORAL DAILY
Status: DISCONTINUED | OUTPATIENT
Start: 2019-11-07 | End: 2019-11-08 | Stop reason: HOSPADM

## 2019-11-06 RX ORDER — TOPIRAMATE 100 MG/1
100 TABLET, FILM COATED ORAL 3 TIMES DAILY
Status: DISCONTINUED | OUTPATIENT
Start: 2019-11-06 | End: 2019-11-08 | Stop reason: HOSPADM

## 2019-11-06 RX ORDER — CLOPIDOGREL BISULFATE 75 MG/1
75 TABLET ORAL DAILY
Status: DISCONTINUED | OUTPATIENT
Start: 2019-11-07 | End: 2019-11-08 | Stop reason: HOSPADM

## 2019-11-06 RX ORDER — HYDRALAZINE HYDROCHLORIDE 20 MG/ML
10 INJECTION INTRAMUSCULAR; INTRAVENOUS EVERY 4 HOURS PRN
Status: CANCELLED | OUTPATIENT
Start: 2019-11-06

## 2019-11-06 RX ORDER — TOPIRAMATE 100 MG/1
100 TABLET, FILM COATED ORAL 3 TIMES DAILY
Status: CANCELLED | OUTPATIENT
Start: 2019-11-06

## 2019-11-06 RX ADMIN — TOPIRAMATE 100 MG: 100 TABLET, FILM COATED ORAL at 09:50

## 2019-11-06 RX ADMIN — ATORVASTATIN CALCIUM 80 MG: 80 TABLET, FILM COATED ORAL at 18:44

## 2019-11-06 RX ADMIN — ZIPRASIDONE HCL 40 MG: 40 CAPSULE ORAL at 20:51

## 2019-11-06 RX ADMIN — ZIPRASIDONE HYDROCHLORIDE 40 MG: 40 CAPSULE ORAL at 09:50

## 2019-11-06 RX ADMIN — ASPIRIN 81 MG 81 MG: 81 TABLET ORAL at 09:50

## 2019-11-06 RX ADMIN — ENOXAPARIN SODIUM 105 MG: 120 INJECTION SUBCUTANEOUS at 20:53

## 2019-11-06 RX ADMIN — TOPIRAMATE 100 MG: 100 TABLET, FILM COATED ORAL at 16:04

## 2019-11-06 RX ADMIN — TOPIRAMATE 100 MG: 100 TABLET, FILM COATED ORAL at 20:52

## 2019-11-06 RX ADMIN — METOPROLOL TARTRATE 25 MG: 25 TABLET ORAL at 09:50

## 2019-11-06 RX ADMIN — CLOPIDOGREL BISULFATE 75 MG: 75 TABLET ORAL at 09:52

## 2019-11-06 RX ADMIN — ENOXAPARIN SODIUM 105 MG: 120 INJECTION SUBCUTANEOUS at 09:50

## 2019-11-06 NOTE — PLAN OF CARE
Problem: PAIN - ADULT  Goal: Verbalizes/displays adequate comfort level or baseline comfort level  Description  Interventions:  - Encourage patient to monitor pain and request assistance  - Assess pain using appropriate pain scale  - Administer analgesics based on type and severity of pain and evaluate response  - Implement non-pharmacological measures as appropriate and evaluate response  - Consider cultural and social influences on pain and pain management  - Notify physician/advanced practitioner if interventions unsuccessful or patient reports new pain  Outcome: Progressing     Problem: CARDIOVASCULAR - ADULT  Goal: Maintains optimal cardiac output and hemodynamic stability  Description  INTERVENTIONS:  - Monitor I/O, vital signs and rhythm  - Monitor for S/S and trends of decreased cardiac output  - Administer and titrate ordered vasoactive medications to optimize hemodynamic stability  - Assess quality of pulses, skin color and temperature  - Assess for signs of decreased coronary artery perfusion  - Instruct patient to report change in severity of symptoms  Outcome: Progressing  Goal: Absence of cardiac dysrhythmias or at baseline rhythm  Description  INTERVENTIONS:  - Continuous cardiac monitoring, vital signs, obtain 12 lead EKG if ordered  - Administer antiarrhythmic and heart rate control medications as ordered  - Monitor electrolytes and administer replacement therapy as ordered  Outcome: Progressing

## 2019-11-06 NOTE — PHYSICAL THERAPY NOTE
PT EVALUATION  Patient seen by PT, no skilled therapy needs at this time  11/06/19 1030   Pain Assessment   Pain Assessment No/denies pain   Home Living   Type of [address] One level  (no stairs to enter)   Home Equipment   (none)   Additional Comments patient independent without assistive device   Prior Function   Level of [place] Independent with ADLs and functional mobility   Lives With Alone   ADL Assistance Independent   IADLs Independent   General   Additional Pertinent History chart reviewed, patient admitted with chest pain  pain now resolved and patient presents as independent and does not require skilled PT services   Family/Caregiver Present No   Cognition   Overall Cognitive Status WFL   Arousal/Participation Cooperative   Orientation Level Oriented X4   Following Commands Follows all commands and directions without difficulty   RUE Assessment   RUE Assessment WFL   LUE Assessment   LUE Assessment WFL   RLE Assessment   RLE Assessment WFL   LLE Assessment   LLE Assessment WFL   Coordination   Movements are Fluid and Coordinated 1   Bed Mobility   Supine to Sit 7  Independent   Sit to Supine 7  Independent   Transfers   Sit to Stand 7  Independent   Stand to Sit 7  Independent   Ambulation/Elevation   Gait Assistance 7  Independent   Balance   Static Sitting Good   Dynamic Sitting Good   Static Standing Good   Dynamic Standing Fair +   Ambulatory Fair +   Activity Tolerance   Activity Tolerance Patient tolerated treatment well   Nurse Made Aware yes   Assessment   Assessment   Patient seen for Physical Therapy evaluation  Patient admitted with Unstable angina pectoris due to coronary arteriosclerosis ([facility] [address] )  Comorbidities affecting patient's physical performance include: bipolar, CAD, schizo-affective psychosis, cholecystectomy, knee surgery and back surgery  Patient now presents as independent with gait, transfers and ADLs and does not required skilled PT   Personal factors affecting patient at time of initial evaluation include: limited home support, inability to perform caregiver support/tasks, inability to perform physical activity and inability to perform IADLS   Prior to admission, patient was independent with functional mobility without assistive device, independent with ADLS, independent with IADLS, ambulating household distance and ambulating community distances  Please find objective findings from Physical Therapy assessment regarding body systems outlined above with impairments and limitations including no new functional deficits  The Barthel Index was used as a functional outcome tool presenting with a score of 100 today indicating no limitations of functional mobility and ADLS  Patient's clinical presentation is currently stable  as seen in patient's presentation of no new functional deficits  As demonstrated by objective findings, the assigned level of complexity for this evaluation is low     Goals   Patient Goals to go home   STG Expiration Date   (NA)   LTG Expiration Date   (NA)   Recommendation   Recommendation Home independently   Barthel Index   Feeding 10   Bathing 5   Grooming Score 5   Dressing Score 10   Bladder Score 10   Bowels Score 10   Toilet Use Score 10   Transfers (Bed/Chair) Score 15   Mobility (Level Surface) Score 15   Stairs Score 10   Barthel Index Score 087   Licensure   [place] License Number  Guillaume Quinonez PT [id number]

## 2019-11-06 NOTE — DISCHARGE SUMMARY
Discharge Summary - [facility]    Patient Information: Sharlene Guerrero 60 y o  male MRN: [id number]  Unit/Bed#: [address] 150-07 Encounter: [id number]    Discharging Physician / Practitioner: Halina Busby DO  PCP: Estiven Ponce  Admission Date: 11/5/2019  Discharge Date: 11/06/19    Reason for Admission: Chest Pain (pt c/o mind chest pain yesterday, today felt pain going down left arm & became nauseous  said it took his girlfriend over an hour to call 911  pt had stent placement 10 weeks ago  ) and Knee Pain      Discharge Diagnoses:     Principal Problem:    NSTEMI (non-ST elevated myocardial infarction) ([facility] [address] )  Active Problems:    CAD (coronary artery disease)    Bipolar 1 disorder (HCC)    Tobacco user    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[facility] )  Resolved Problems:    Hypokalemia        * NSTEMI (non-ST elevated myocardial infarction) [facility])  Assessment & Plan  Chest pain with elevated troponin concerning for NSTEMI type 1  Patient did have cardiac catheterization with CHARBEL of LAD 08/2019  He states that he has been compliant with all medications including antiplatelets and statin  However he still smokes 1 1/2 PPD  Case was discussed with cardiologist and patient being transferred to [facility] for further evaluation  Continue weight based lovenox q 12 hours, aspirin, Plavix, statin, Lopressor    Results from last 7 days   Lab Units 11/06/19  1021 11/06/19  0606 11/06/19  0124 11/05/19  2237 11/05/19  1943   TROPONIN I ng/mL 4 38* 6 07* 5 49* 4 89* 1 15*       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)  Assessment & Plan  On CTA dissection protocol, he was found to have 6 mm thrombus of left common iliac artery    vascular surgery evaluation  Continue weight based Lovenox, DAPT and statin    Tobacco user  Assessment & Plan  Tobacco user  Advised cessation    Nicotine patch    Bipolar 1 disorder (HCC)  Assessment & Plan  Bipolar disorder with increased stressors  Currently on topiramate and geodon  added lorazepam as needed  CAD (coronary artery disease)  Assessment & Plan  Coronary artery disease status post recent LAD stent  Continue Lopressor, Plavix, ASA and atorvastatin  Patient reports being compliant with his medications    Hypokalemiaresolved as of 11/6/2019  Assessment & Plan  Resolved    Results from last 7 days   Lab Units 11/06/19  0606 11/05/19  1943   POTASSIUM mmol/L 3 9 3 3*       Consultations During Hospital Stay:  [address]  IP CONSULT TO VASCULAR SURGERY    Procedures Performed:     · none    Significant Findings:     · Refer to hospital course and above listed diagnosis related plan for details    Imaging while in hospital:    Xr Chest 1 View Portable    Result Date: 11/5/2019  Narrative: CHEST INDICATION:   suicidal ideation  COMPARISON:  August 13, 2019 EXAM PERFORMED/VIEWS:  XR CHEST PORTABLE FINDINGS: Heart top normal in size  Pulmonary vessels unremarkable  The lungs are clear  No pneumothorax or pleural effusion  Osseous structures appear within normal limits for patient age  Impression: No acute abnormality in the chest  Workstation performed: [id number]     Cta Dissection Protocol Chest And Abdomen    Result Date: 11/5/2019  Narrative: CTA - CHEST AND ABDOMEN  - WITHOUT AND WITH IV CONTRAST INDICATION:   chest pain  Unspecified abdominal pain COMPARISON:  None  TECHNIQUE: CT examination of the chest and abdomen was performed both prior to and after the administration of intravenous contrast   Thin section angiographic arterial phase post contrast technique was used in order to evaluate for aortic dissection  3D reformatted images and volume rendering were performed on an independent workstation  Additionally, axial, sagittal, and coronal 2D reformatted images were created from the source data and submitted for interpretation  Radiation dose length product (DLP) for this visit:  1864 mGy-cm     This examination, like all CT scans performed in the [facility], was performed utilizing techniques to minimize radiation dose exposure, including the use of iterative reconstruction and automated exposure control  IV Contrast:  100 mL of iohexol (OMNIPAQUE) Enteric Contrast: Enteric contrast was not administered  FINDINGS: AORTA: There is no aortic dissection or intramural hematoma  There is no aortic aneurysm  Mild-to-moderate atherosclerotic plaque throughout the infrarenal abdominal aorta and its branches  Prominent 6 mm thrombus projecting into the lumen along the medial luminal aspect of the left common iliac artery  CHEST LUNGS:  Emphysematous changes  PLEURA:  Unremarkable  HEART/GREAT VESSELS:  Unremarkable for patient's age  MEDIASTINUM AND ASHLY:  Unremarkable  CHEST WALL AND LOWER NECK:   Unremarkable  ABDOMEN LIVER/BILIARY TREE:  Unremarkable  GALLBLADDER:  Gallbladder is surgically absent  SPLEEN:  Unremarkable  PANCREAS:  Unremarkable  ADRENAL GLANDS:  Unremarkable  KIDNEYS/URETERS:  Unremarkable  No hydronephrosis  VISUALIZED STOMACH, BOWEL AND APPENDIX:  Unremarkable  VISUALIZED ABDOMINOPELVIC CAVITY:  No ascites or free intraperitoneal air  No lymphadenopathy  ABDOMINAL WALL:  Unremarkable  OSSEOUS STRUCTURES:  Left L4 pars defect with post surgical laminectomy at L4-L5  Impression: 1  No evidence of aneurysm, dissection or pulmonary embolus  2  Severe atherosclerotic plaque LAD coronary artery  3  Prominent 6 mm thrombus projecting into the lumen along the medial luminal aspect of the left common iliac artery   Workstation performed: [id number]       Incidental Findings:   · Left common iliac artery thrombus    Test Results Pending at Discharge (will require follow up):   · As per After Visit Summary     Outpatient Tests Requested:  · N/A    Complications:  Refer to hospital course and above listed diagnosis related plan, if any    Hospital Course:     Carmen rubi a [age] y o  male patient who originally presented to the hospital on 11/5/2019 due to worsening chest pain  Patient with recent LAD stenosis requiring CHARBEL placement  In the ER workup revealed elevated troponin and patient was admitted  Patient was seen by Cardiology and decision made to transfer patient to [facility] for cardiac catheterization and possible PCI  Case was discussed with Dr Alonzo Breen who has accepted the patient  Risks and benefits of transfer discussed with patient and he is in agreement    Please see above list of diagnoses and related plan for additional information  Condition at Discharge: stable     Discharge Day Visit / Exam:     Subjective:  Patient denies any chest pain, shortness of breath  Reports right knee pain    Vitals: Blood Pressure: 114/58 (11/06/19 0945)  Pulse: 64 (11/06/19 0945)  Temperature: 97 6 °F (36 4 °C) (11/06/19 0945)  Temp Source: Tympanic (11/06/19 0945)  Respirations: 18 (11/06/19 0945)  Height: 5' 5" (165 1 cm) (11/05/19 1927)  Weight - Scale: 109 kg (241 lb) (11/05/19 1927)  SpO2: 96 % (11/06/19 0945)  Exam:   Physical Exam   Constitutional: He is oriented to person, place, and time  He appears well-developed and well-nourished  No distress  HENT:   Head: Normocephalic and atraumatic  Eyes: EOM are normal  Right eye exhibits no discharge  Left eye exhibits no discharge  No scleral icterus  Cardiovascular: Normal rate and regular rhythm  Pulmonary/Chest: Effort normal and breath sounds normal  No respiratory distress  He has no wheezes  He has no rales  Abdominal: Soft  Bowel sounds are normal  He exhibits no distension  There is no tenderness  Musculoskeletal:   Patient declined to have his feet examined   Neurological: He is alert and oriented to person, place, and time  No cranial nerve deficit  Skin: He is not diaphoretic  Psychiatric: He has a normal mood and affect         Discharge instructions/Information to patient and family:(Discharge Medications and Follow up):   See after visit summary for information provided to patient and family  Provisions for Follow-Up Care:  See after visit summary for information related to follow-up care and any pertinent home health orders  Disposition:  Transfer to [facility]     Discharge Statement:  I spent 35 minutes discharging the patient  This time was spent on the day of discharge  I had direct contact with the patient on the day of discharge  Greater than 50% of the total time was spent examining patient, answering all patient questions, arranging and discussing plan of care with patient as well as directly providing post-discharge instructions  Additional time then spent on discharge activities  Discharge Medications:  See after visit summary for reconciled discharge medications provided to patient and family  ** Please Note: "This note has been constructed using a voice recognition system  Therefore there may be syntax, spelling, and/or grammatical errors   Please call if you have any questions  "**

## 2019-11-06 NOTE — PROGRESS NOTES
Cardiac enzymes on rise  Unchanged in clinical status  Make patient NPO in case catheterization or further intervention is warranted in a m   Continue weight based lovenox    Results from last 7 days   Lab Units 11/05/19  2237 11/05/19  1943   TROPONIN I ng/mL 4 89* 1 15*

## 2019-11-06 NOTE — EMTALA/ACUTE CARE TRANSFER
[address] [id number]  Dept: [phone]      ACUTE CARE TRANSFER CONSENT    NAME Rachel Guerrero                                         1955                              MRN [id number]    I have been informed of my rights regarding examination, treatment, and transfer   by Dr Arron Knox DO    Benefits: Specialized equipment and/or services available at the receiving facility (Include comment)________________________    Risks: Potential for delay in receiving treatment, Potential deterioration of medical condition, Loss of IV, Increased discomfort during transfer, Possible worsening of condition or death during transfer      Consent for Transfer:  I acknowledge that my medical condition has been evaluated and explained to me by the treating physician or other qualified medical person and/or my attending physician, who has recommended that I be transferred to the service of  Accepting Physician: Dr Dori Zapien at [address] Name, [address] : [facility]  The above potential benefits of such transfer, the potential risks associated with such transfer, and the probable risks of not being transferred have been explained to me, and I fully understand them  The doctor has explained that, in my case, the benefits of transfer outweigh the risks  I agree to be transferred  I authorize the performance of emergency medical procedures and treatments upon me in both transit and upon arrival at the receiving facility  Additionally, I authorize the release of any and all medical records to the receiving facility and request they be transported with me, if possible  I understand that the safest mode of transportation during a medical emergency is an ambulance and that the Hospital advocates the use of this mode of transport   Risks of traveling to the receiving facility by car, including absence of medical control, life sustaining equipment, such as oxygen, and medical personnel has been explained to me and I fully understand them  (KEYONNA CORRECT BOX BELOW)  [  ]  I consent to the stated transfer and to be transported by ambulance/helicopter  [  ]  I consent to the stated transfer, but refuse transportation by ambulance and accept full responsibility for my transportation by car  I understand the risks of non-ambulance transfers and I exonerate the Hospital and its staff from any deterioration in my condition that results from this refusal     X___________________________________________    DATE  19  TIME________  Signature of patient or legally responsible individual signing on patient behalf           RELATIONSHIP TO PATIENT_________________________          Provider Certification    NAME Ny Guerrero                                        [facility] 1955                              MRN [id number]    A medical screening exam was performed on the above named patient    Based on the examination:    Condition Necessitating Transfer Chest pain with elevated troponins requiring cardiac catheterization and further intervention    Patient Condition: The patient has been stabilized such that within reasonable medical probability, no material deterioration of the patient condition or the condition of the unborn child(thony) is likely to result from the transfer    Reason for Transfer: Level of Care needed not available at this facility    Transfer Requirements: [address]   · Space available and qualified personnel available for treatment as acknowledged by    · Agreed to accept transfer and to provide appropriate medical treatment as acknowledged by       Dr Josette Gaspar  · Appropriate medical records of the examination and treatment of the patient are provided at the time of transfer   [address] _______  · Transfer will be performed by qualified personnel from    and appropriate transfer equipment as required, including the use of necessary and appropriate life support measures  Provider Certification: I have examined the patient and explained the following risks and benefits of being transferred/refusing transfer to the patient/family:  General risk, such as traffic hazards, adverse weather conditions, rough terrain or turbulence, possible failure of equipment (including vehicle or aircraft), or consequences of actions of persons outside the control of the transport personnel, Unanticipated needs of medical equipment and personnel during transport, Risk of worsening condition      Based on these reasonable risks and benefits to the patient and/or the unborn child(thony), and based upon the information available at the time of the patients examination, I certify that the medical benefits reasonably to be expected from the provision of appropriate medical treatments at another medical facility outweigh the increasing risks, if any, to the individuals medical condition, and in the case of labor to the unborn child, from effecting the transfer      X____________________________________________ DATE 11/06/19        TIME_______      ORIGINAL - SEND TO MEDICAL RECORDS   COPY - SEND WITH PATIENT DURING TRANSFER

## 2019-11-06 NOTE — ASSESSMENT & PLAN NOTE
Patient with underlying CAD s/p  recent catheterization on 8/13/2019 with CHARBEL x1 to proximal LAD and angioplasty to 90% on 1st diagonal  Ongoing tobacco smoking  Presented with chest pain and non STEMI type 1  Patient was transferred to [facility] for cardiac catheterization  Continue aspirin, Plavix, statin and beta-blocker

## 2019-11-06 NOTE — CONSULTS
[facility]  [address]  [address]   [place], [address]  Cardiology Consultation    Melvina Verma  [id number]  1955      Consult for:chest pain  Appreciate consult by: ALHAJI Vickers    Discussion/Summary:   Type 1 myocardial infarction/NSTEMI- recent LAD STEMI  Reports having recurrence of typical chest discomfort relieved with nitroglycerin  Continues to actively smoke  States being compliant with his dual anti-platelet therapy  He is currently chest pain-free  Awaiting troponin peak- last troponin 6  Currently on therapeutic Lovenox  Reviewed last catheterization- significant bystander disease/balloon angioplasty of large diagonal branch? Plan for transfer for repeat catheterization/possible PCI  Given without major EKG changes- lower suspicion for acute stent thrombosis of a proximal LAD  However possible switching from Plavix to Effient prior to discharge  Active smoking- reluctant to quit    Bipolar schizoaffective disorder    COPD- inhaler therapy    Significant peripheral artery disease- 6 mm thrombus projecting into the left common iliac artery    Prior laminectomy      HPI:   60year-old active smoker, bipolar disorder with recent anterior STEMI presents with recurrence of 7/10 left-sided substernal chest discomfort with light activities  He states being compliant with his outpatient anti-platelet medications  He has not missed any of his dosages  He states being in his usual state until the past week noticing recurrence of heaviness in his chest   A prior to admission he reported having 7/10 chest pain at rest   He states the pain improved when given nitroglycerin  He reports having associated nausea with the chest discomfort  He denies having any recent fevers or chills  He denies having any bleeding episodes        Past Medical History:   Diagnosis Date    Bipolar 1 disorder ([facility] [address] [facility]     CAD (coronary artery disease)     Schizo-affective psychosis ([facility] [address] )     Tobacco user      Social History     Socioeconomic History    Marital status: Single     Spouse name: Not on file    Number of children: Not on file    Years of education: Not on file    Highest education level: Not on file   Occupational History    Occupation: none     Employer: none   Social Needs    Financial resource strain: Not on file    Food insecurity:     Worry: Not on file     Inability: Not on file   [employer] needs:     Medical: Not on file     Non-medical: Not on file   Tobacco Use    Smoking status: Heavy Tobacco Smoker     Packs/day: 1 50     Types: Cigarettes    Smokeless tobacco: Never Used   Substance and Sexual Activity    Alcohol use: Not Currently    Drug use: Never    Sexual activity: Not on file   Lifestyle    Physical activity:     Days per week: Not on file     Minutes per session: Not on file    Stress: Not on file   Relationships    Social connections:     Talks on phone: Not on file     Gets together: Not on file     Attends [employer] service: Not on file     Active member of club or organization: Not on file     Attends meetings of clubs or organizations: Not on file     Relationship status: Not on file    Intimate partner violence:     Fear of current or ex partner: Not on file     Emotionally abused: Not on file     Physically abused: Not on file     Forced sexual activity: Not on file   Other Topics Concern    Not on file   Social History Narrative    Not on file      Family History   Problem Relation Age of Onset    Heart disease Mother     Heart disease Father     Multiple sclerosis Sister      Past Surgical History:   Procedure Laterality Date    BACK SURGERY      CARDIAC SURGERY      stent placement    CHOLECYSTECTOMY      KNEE SURGERY Right        Current Facility-Administered Medications:     aspirin chewable tablet 81 mg, 81 mg, Oral, Daily, Keegan Vasquez DO, 81 mg at 11/06/19 0950    atorvastatin (LIPITOR) tablet 80 mg, 80 mg, Oral, QPM, Keegan Vasquez DO    clopidogrel (PLAVIX) tablet 75 mg, 75 mg, Oral, Daily, Keegan Vasquez, , 75 mg at 11/06/19 3293    diphenhydrAMINE (BENADRYL) tablet 25 mg, 25 mg, Oral, Q6H PRN, Cedric Craig DO    docusate sodium (COLACE) capsule 100 mg, 100 mg, Oral, BID PRN, Keegan Vasquez DO    enoxaparin (LOVENOX) subcutaneous injection 105 mg, 105 mg, Subcutaneous, Q12H [address], Keegan Vasquez, DO, 105 mg at 11/06/19 0950    hydrALAZINE (APRESOLINE) injection 10 mg, 10 mg, Intravenous, Q4H PRN, Cedric Craig DO    levalbuterol (XOPENEX) inhalation solution 1 25 mg, 1 25 mg, Nebulization, Q6H PRN, Keegan Vasquez DO    LORazepam (ATIVAN) 2 mg/mL injection 0 5 mg, 0 5 mg, Intravenous, Q4H PRN, Keegan Vasquez DO    magnesium hydroxide (MILK OF MAGNESIA) 400 mg/5 mL oral suspension 30 mL, 30 mL, Oral, BID PRN, Cedric Craig DO    metoprolol tartrate (LOPRESSOR) tablet 25 mg, 25 mg, Oral, Q12H [address], Keegan Vasquez, , 25 mg at 11/06/19 0950    morphine injection 2 mg, 2 mg, Intravenous, Q4H PRN, Cedric Craig DO    nicotine (NICODERM CQ) 21 mg/24 hr TD 24 hr patch 1 patch, 1 patch, Transdermal, Daily, Keegan Vasquez DO    nitroglycerin (NITROSTAT) SL tablet 0 4 mg, 0 4 mg, Sublingual, Q5 Min PRN, Keegan Vasquez DO    ondansetron (ZOFRAN) injection 4 mg, 4 mg, Intravenous, Q4H PRN, Keegan Vasquez DO    oxyCODONE (ROXICODONE) IR tablet 5 mg, 5 mg, Oral, Q4H PRN, Keegan Pedro, DO    topiramate (TOPAMAX) tablet 100 mg, 100 mg, Oral, TID, Keegan Pedro, DO, 100 mg at 11/06/19 0950    ziprasidone (GEODON) capsule 40 mg, 40 mg, Oral, BID, Keegan Pedro, DO, 40 mg at 11/06/19 0950  Allergies   Allergen Reactions    Fish-Derived Products Hives    Rice Bean [Phaseolus] Hives     Vitals:    11/06/19 0525 11/06/19 0945 11/06/19 1139 11/06/19 1145   BP:  114/58     BP Location:  Left arm     Pulse: 60 64     Resp: 16 18     Temp:  97 6 °F (36 4 °C)     TempSrc:  Tympanic     SpO2: 96% 96%     Weight:       Height:   5' 9" (1 753 m) 5' 9" (1 753 m)       Review of Systems:   Review of Systems   Constitutional: Negative  HENT: Negative  Eyes: Negative  Respiratory: Positive for shortness of breath  Cardiovascular: Positive for chest pain  Gastrointestinal: Negative  Endocrine: Negative  Genitourinary: Negative  Musculoskeletal: Positive for arthralgias, back pain and gait problem  Skin: Negative  Allergic/Immunologic: Negative  Hematological: Negative  Psychiatric/Behavioral: Negative  Vitals:    11/06/19 0525 11/06/19 0945 11/06/19 1139 11/06/19 1145   BP:  114/58     BP Location:  Left arm     Pulse: 60 64     Resp: 16 18     Temp:  97 6 °F (36 4 °C)     TempSrc:  Tympanic     SpO2: 96% 96%     Weight:       Height:   5' 9" (1 753 m) 5' 9" (1 753 m)     Physical Examination:   Physical Exam   Constitutional: He is oriented to person, place, and time  No distress  HENT:   Head: Normocephalic and atraumatic  Right Ear: External ear normal    Left Ear: External ear normal    Eyes: Pupils are equal, round, and reactive to light  Conjunctivae are normal  Right eye exhibits no discharge  Left eye exhibits no discharge  No scleral icterus  Neck: Normal range of motion  Neck supple  No JVD present  No tracheal deviation present  No thyromegaly present  Cardiovascular: Normal rate and regular rhythm  Exam reveals gallop  Exam reveals no friction rub  No murmur heard  Pulmonary/Chest: Effort normal and breath sounds normal  No stridor  No respiratory distress  He has no wheezes  He has no rales  He exhibits no tenderness  Abdominal: Soft  Bowel sounds are normal  He exhibits no distension and no mass  There is no tenderness  There is no rebound and no guarding  Musculoskeletal: Normal range of motion  He exhibits no edema, tenderness or deformity  Neurological: He is alert and oriented to person, place, and time  He has normal reflexes   He displays normal reflexes  No cranial nerve deficit  He exhibits normal muscle tone  Coordination normal    Skin: Skin is warm and dry  No rash noted  He is not diaphoretic  No erythema  No pallor  Psychiatric: He has a normal mood and affect   His behavior is normal  Judgment and thought content normal        Labs:     Lab Results   Component Value Date    WBC 9 17 11/06/2019    HGB 15 8 11/06/2019    HCT 47 9 11/06/2019     (H) 11/06/2019    RDW 13 8 11/06/2019     11/06/2019     BMP:  Lab Results   Component Value Date    SODIUM 136 11/06/2019    K 3 9 11/06/2019     11/06/2019    CO2 26 11/06/2019    BUN 5 11/06/2019    CREATININE 0 85 11/06/2019    GLUC 94 11/06/2019    GLUF 94 11/06/2019    CALCIUM 8 7 11/06/2019    EGFR 93 11/06/2019    MG 2 1 11/05/2019     LFT:  Lab Results   Component Value Date    AST 25 11/06/2019    ALT 17 11/06/2019    ALKPHOS 69 11/06/2019    TP 6 8 11/06/2019    ALB 3 0 (L) 11/06/2019      Lab Results   Component Value Date    JMW5NACBSAEX 1 596 11/05/2019     Lab Results   Component Value Date    HGBA1C 5 6 08/14/2019     Lipid Profile:   Lab Results   Component Value Date    CHOLESTEROL 145 11/06/2019    HDL 41 11/06/2019    LDLCALC 90 11/06/2019    TRIG 71 11/06/2019     Lab Results   Component Value Date    CHOLESTEROL 145 11/06/2019    CHOLESTEROL 142 08/14/2019     Lab Results   Component Value Date    TROPONINI 6 07 (H) 11/06/2019     Lab Results   Component Value Date    NTBNP 253 (H) 11/05/2019      Recent Results (from the past 672 hour(s))   ECG 12 lead    Collection Time: 11/05/19  7:31 PM   Result Value Ref Range    Ventricular Rate 80 BPM    Atrial Rate 80 BPM    VA Interval 132 ms    QRSD Interval 80 ms    QT Interval 368 ms    QTC Interval 424 ms    P Axis 37 degrees    QRS Axis -39 degrees    T Wave Axis 0 degrees   CBC and differential    Collection Time: 11/05/19  7:43 PM   Result Value Ref Range    WBC 15 30 (H) 4 31 - 10 16 Thousand/uL    RBC 4  83 3 88 - 5 62 Million/uL    Hemoglobin 16 0 12 0 - 17 0 g/dL    Hematocrit 47 6 36 5 - 49 3 %    MCV 99 (H) 82 - 98 fL    MCH 33 1 26 8 - 34 3 pg    MCHC 33 6 31 4 - 37 4 g/dL    RDW 13 7 11 6 - 15 1 %    MPV 10 2 8 9 - 12 7 fL    Platelets 954 768 - 825 Thousands/uL    nRBC 0 /100 WBCs    Neutrophils Relative 83 (H) 43 - 75 %    Immat GRANS % 0 0 - 2 %    Lymphocytes Relative 12 (L) 14 - 44 %    Monocytes Relative 5 4 - 12 %    Eosinophils Relative 0 0 - 6 %    Basophils Relative 0 0 - 1 %    Neutrophils Absolute 12 66 (H) 1 85 - 7 62 Thousands/µL    Immature Grans Absolute 0 05 0 00 - 0 20 Thousand/uL    Lymphocytes Absolute 1 80 0 60 - 4 47 Thousands/µL    Monocytes Absolute 0 70 0 17 - 1 22 Thousand/µL    Eosinophils Absolute 0 05 0 00 - 0 61 Thousand/µL    Basophils Absolute 0 04 0 00 - 0 10 Thousands/µL   Comprehensive metabolic panel    Collection Time: 11/05/19  7:43 PM   Result Value Ref Range    Sodium 136 136 - 145 mmol/L    Potassium 3 3 (L) 3 5 - 5 3 mmol/L    Chloride 102 100 - 108 mmol/L    CO2 23 21 - 32 mmol/L    ANION GAP 11 4 - 13 mmol/L    BUN 6 5 - 25 mg/dL    Creatinine 1 09 0 60 - 1 30 mg/dL    Glucose 107 65 - 140 mg/dL    Calcium 8 8 8 3 - 10 1 mg/dL    AST 34 5 - 45 U/L    ALT 20 12 - 78 U/L    Alkaline Phosphatase 75 46 - 116 U/L    Total Protein 7 1 6 4 - 8 2 g/dL    Albumin 3 1 (L) 3 5 - 5 0 g/dL    Total Bilirubin 0 40 0 20 - 1 00 mg/dL    eGFR 72 ml/min/1 73sq m   Magnesium    Collection Time: 11/05/19  7:43 PM   Result Value Ref Range    Magnesium 2 1 1 6 - 2 6 mg/dL   Lipase    Collection Time: 11/05/19  7:43 PM   Result Value Ref Range    Lipase 34 (L) 73 - 393 u/L   Troponin I    Collection Time: 11/05/19  7:43 PM   Result Value Ref Range    Troponin I 1 15 (H) <=0 04 ng/mL   NT-BNP PRO    Collection Time: 11/05/19  7:43 PM   Result Value Ref Range    NT-proBNP 253 (H) <125 pg/mL   TSH, 3rd generation    Collection Time: 11/05/19  7:43 PM   Result Value Ref Range    [facility] 358 - 3 740 uIU/mL   Troponin I    Collection Time: 11/05/19 10:37 PM   Result Value Ref Range    Troponin I 4 89 (H) <=0 04 ng/mL   ECG 12 lead    Collection Time: 11/06/19  1:19 AM   Result Value Ref Range    Ventricular Rate 56 BPM    Atrial Rate 56 BPM    MA Interval 140 ms    QRSD Interval 74 ms    QT Interval 426 ms    QTC Interval 411 ms    P [place] 51 degrees    QRS Axis -14 degrees    T Wave Axis 8 degrees   ECG 12 lead    Collection Time: 11/06/19  1:19 AM   Result Value Ref Range    Ventricular Rate 56 BPM    Atrial Rate 56 BPM    MA Interval 138 ms    QRSD Interval 74 ms    QT Interval 452 ms    QTC Interval 436 ms    P Axis 54 degrees    QRS Axis -14 degrees    T Wave Axis 2 degrees   Troponin I    Collection Time: 11/06/19  1:24 AM   Result Value Ref Range    Troponin I 5 49 (H) <=0 04 ng/mL   Comprehensive metabolic panel    Collection Time: 11/06/19  6:06 AM   Result Value Ref Range    Sodium 136 136 - 145 mmol/L    Potassium 3 9 3 5 - 5 3 mmol/L    Chloride 103 100 - 108 mmol/L    CO2 26 21 - 32 mmol/L    ANION GAP 7 4 - 13 mmol/L    BUN 5 5 - 25 mg/dL    Creatinine 0 85 0 60 - 1 30 mg/dL    Glucose 94 65 - 140 mg/dL    Glucose, Fasting 94 65 - 99 mg/dL    Calcium 8 7 8 3 - 10 1 mg/dL    AST 25 5 - 45 U/L    ALT 17 12 - 78 U/L    Alkaline Phosphatase 69 46 - 116 U/L    Total Protein 6 8 6 4 - 8 2 g/dL    Albumin 3 0 (L) 3 5 - 5 0 g/dL    Total Bilirubin 0 60 0 20 - 1 00 mg/dL    eGFR 93 ml/min/1 73sq m   CBC (With Platelets)    Collection Time: 11/06/19  6:06 AM   Result Value Ref Range    WBC 9 17 4 31 - 10 16 Thousand/uL    RBC 4 74 3 88 - 5 62 Million/uL    Hemoglobin 15 8 12 0 - 17 0 g/dL    Hematocrit 47 9 36 5 - 49 3 %     (H) 82 - 98 fL    MCH 33 3 26 8 - 34 3 pg    MCHC 33 0 31 4 - 37 4 g/dL    RDW 13 8 11 6 - 15 1 %    Platelets 638 911 - 549 Thousands/uL    MPV 10 1 8 9 - 12 7 fL   Lipid panel    Collection Time: 11/06/19  6:06 AM   Result Value Ref Range Cholesterol 145 50 - 200 mg/dL    Triglycerides 71 <=150 mg/dL    HDL, Direct 41 >=40 mg/dL    LDL Calculated 90 0 - 100 mg/dL    Non-HDL-Chol (CHOL-HDL) 104 mg/dl   Troponin I    Collection Time: 19  6:06 AM   Result Value Ref Range    Troponin I 6 07 (H) <=0 04 ng/mL       Imaging & Testing   I have personally reviewed pertinent reports  Cardiac Testing     Results for orders placed during the hospital encounter of 19   Echo complete with contrast if indicated    Narrative [address]  [facility] [place] [address] [facility]  [phone]    Transthoracic Echocardiogram  2D, M-mode, Doppler, and Color Doppler    Study date:  14-Aug-2019    Patient: Bernabe Claude  MR number: [id number]  Account number: [de-identified]  : 1955  Age: 66 years  Gender: Male  Status: Inpatient  Location: Bedside  Height: 70 in  Weight: 232 5 lb  BP: 121/ 74 mmHg    Indications: Myocardial infarction  Diagnoses: I25 119 - Atherosclerotic heart disease of native coronary artery with unspecified angina pectoris    Sonographer:  Jimmie Moreno RDCS  Referring Physician:  Devaughn Patino MD  Group:  [facility]  Cardiology Fellow:  Sylwia Rodriguez MD  Interpreting Physician:  Misael Trevino MD    SUMMARY    LEFT VENTRICLE:  Systolic function was normal  Ejection fraction was estimated to be 55 %  There was possible hypokinesis of the apical wall(s)  AORTIC VALVE:  There was mild regurgitation  HISTORY: PRIOR HISTORY: Heavy smoker, bipolar disorder, schizo-affective psychosis  PROCEDURE: The procedure was performed at the bedside  This was a routine study  The transthoracic approach was used  The study included complete 2D imaging, M-mode, complete spectral Doppler, and color Doppler  The heart rate was 48 bpm,  at the start of the study  Images were obtained from the parasternal, apical, subcostal, and suprasternal notch acoustic windows   Image quality was adequate  LEFT VENTRICLE: Size was normal  Systolic function was normal  Ejection fraction was estimated to be 55 %  There was possible hypokinesis of the apical wall(s)  Wall thickness was normal  DOPPLER: The ratio of early ventricular filling to  atrial contraction velocities was within the normal range  RIGHT VENTRICLE: The size was normal  Systolic function was normal     LEFT ATRIUM: The atrium was mildly dilated  RIGHT ATRIUM: Size was normal     MITRAL VALVE: Valve structure was normal  There was normal leaflet separation  DOPPLER: There was no evidence for stenosis  There was trace regurgitation  AORTIC VALVE: The valve was trileaflet  Leaflets exhibited mildly increased thickness and normal cuspal separation  DOPPLER: There was no evidence for stenosis  There was mild regurgitation  TRICUSPID VALVE: The valve structure was normal  There was normal leaflet separation  DOPPLER: There was no evidence for stenosis  There was trace regurgitation  PULMONIC VALVE: Leaflets exhibited normal thickness, no calcification, and normal cuspal separation  DOPPLER: The transpulmonic velocity was within the normal range  There was no significant regurgitation  PERICARDIUM: There was no pericardial effusion  The pericardium was normal in appearance  AORTA: The root exhibited normal size  SYSTEMIC VEINS: IVC: The inferior vena cava was normal in size      SYSTEM MEASUREMENT TABLES    2D  %FS: 26 16 %  Ao Diam: 3 53 cm  EDV(Teich): 190 01 ml  EF(Teich): 50 41 %  ESV(Teich): 94 22 ml  IVSd: 0 79 cm  LA Area: 21 81 cm2  LA Diam: 4 22 cm  LVEDV MOD A4C: 153 33 ml  LVEF MOD A4C: 51 31 %  LVESV MOD A4C: 74 66 ml  LVIDd: 6 14 cm  LVIDs: 4 54 cm  LVLd A4C: 8 84 cm  LVLs A4C: 7 94 cm  LVPWd: 0 83 cm  RA Area: 17 23 cm2  RVIDd: 3 cm  SV MOD A4C: 78 67 ml  SV(Teich): 95 78 ml    MM  TAPSE: 2 2 cm    PW  E': 0 06 m/s  E/E': 13 36  MV A Moises: 0 68 m/s  MV Dec Dawes: 3 38 m/s2  MV DecT: 245 06 ms  MV E Moises: 0 83 m/s  MV E/A Ratio: 1 21  MV PHT: 71 07 ms  MVA By PHT: 3 1 cm2    [employer] Accredited Echocardiography Laboratory    Prepared and electronically signed by    Sara Huffman MD  Signed 15-Aug-2019 08:55:39       No results found for this or any previous visit  Results for orders placed during the hospital encounter of 19   Cardiac catheterization    Narrative [address]  [address]  [phone]    [facility]    Invasive Cardiovascular Lab Complete Report    Patient: Roxanna Cuellar  MR number: [id number]  Account number: [de-identified]  Study date: 2019  Gender: Male  : 1955  Height: 70 1 in  Weight: 245 5 lb  BSA: 2 28 mï¾²    Allergies: FISH-DERIVED PRODUCTS    Diagnostic Cardiologist:  Norma Kramer DO  Interventional Cardiologist:  Norma Kramer DO  Primary Physician:  Hitesh DIAZ    CORONARY CIRCULATION:  Proximal LAD: There was a 95 % stenosis  1st diagonal: There was a 90 % stenosis  HEMODYNAMICS:  Hemodynamic assessment demonstrated mildly elevated LVEDP  1ST LESION INTERVENTIONS:  A balloon angioplasty procedure was performed on the lesion in the proximal LAD  A Xience Felipa Rx 3 25 x 33mm drug-eluting stent was placed across the lesion and deployed at a maximum inflation pressure of 16 juliann  2ND LESION INTERVENTIONS:  A successful balloon angioplasty procedure was performed on the 90 % lesion in the 1st diagonal  Following intervention there was a 40 % residual stenosis  INDICATIONS:  --  Possible CAD: unstable angina  PROCEDURES PERFORMED    --  Left heart catheterization without ventriculogram   --  Left coronary angiography  --  Right coronary angiography  --  Acute Myocardial Infarct  --  Mod Sedation Same Physician Initial 15min  --  Mod Sedation Same Physician Add 15min  --  Mod Sedation Same Physician Add 15min  --  Mod Sedation Same Physician Add 15min    -- Coronary Catheterization (w/ LHC)  --  AMI PCI (CHARBEL, PTCRA, PTCA) Single  --  PTCA, Additional Branch of Coronary Artery  --  Intervention on proximal LAD: balloon angioplasty  --  Intervention on D1: balloon angioplasty  PROCEDURE: The risks and alternatives of the procedures and conscious sedation were explained to the patient and informed consent was obtained  The patient was brought to the cath lab and placed on the table  The planned puncture sites  were prepped and draped in the usual sterile fashion  --  Right radial artery access  After performing an Saturnino's test to verify adequate ulnar artery supply to the hand, the radial site was prepped  The puncture site was infiltrated with local anesthetic  The vessel was accessed using the  modified Seldinger technique, a wire was advanced into the vessel, and a sheath was advanced over the wire into the vessel  --  Left heart catheterization without ventriculogram  A catheter was advanced over a guidewire into the ascending aorta  After recording ascending aortic pressure, the catheter was advanced across the aortic valve and left ventricular  pressure was recorded  The catheter was pulled back across the aortic valve and into the ascending aorta and pullback pressures were obtained  --  Left coronary artery angiography  A catheter was advanced over a guidewire into the aorta and positioned in the left coronary artery ostium under fluoroscopic guidance  Angiography was performed  --  Right coronary artery angiography  A catheter was advanced over a guidewire into the aorta and positioned in the right coronary artery ostium under fluoroscopic guidance  Angiography was performed  --  Acute Myocardial Infarct  --  Mod Sedation Same Physician Initial 15min  --  Mod Sedation Same Physician Add 15min  --  Mod Sedation Same Physician Add 15min  --  Mod Sedation Same Physician Add 15min  --  Coronary Catheterization (w/ [facility])      LESION #1 INTERVENTION: A balloon angioplasty procedure was performed on the lesion in the proximal LAD  There was SAVITA 2 flow before the procedure and SAVITA 3 flow after the procedure  --  Vessel setup was performed  A 6Fr  Launcher Ebu 3 5 guiding catheter was used to cannulate the vessel  --  Vessel setup was performed  A [place] 180cm wire was used to cross the lesion  --  Balloon angioplasty was performed, using a Trek Rx 3 0 x 15mm balloon, with 1 inflations and a maximum inflation pressure of 12 juliann  --  A Xience Felipa Rx 3 25 x 33mm drug-eluting stent was placed across the lesion and deployed at a maximum inflation pressure of 16 juliann  --  Vessel setup was performed  A BMW Elite 190cm wire was used to cross the lesion  --  Balloon angioplasty was performed, using a NC Trek Rx 3 5 x 12mm balloon, with 3 inflations and a maximum inflation pressure of 16 juliann  LESION #2 INTERVENTION: A successful balloon angioplasty procedure was performed on the 90 % lesion in the 1st diagonal  Following intervention there was a 40 % residual stenosis  This was a bifurcation lesion  There was SAVITA 3 flow before  the procedure and SAVITA 3 flow after the procedure  There was no dissection  --  Vessel setup was performed  A 6Fr  Launcher Ebu 3 5 guiding catheter was used to cannulate the vessel  --  Vessel setup was performed  A Runthrough NS 300cm wire was used to cross the lesion  --  Balloon angioplasty was performed, using a Emerge MR 2 0 x 12mm balloon, with 2 inflations and a maximum inflation pressure of 12 juliann  A "kissing balloon" technique was employed with simultaneous inflation of a second balloon in the  1st diagonal  The related intervention is described separately  INTERVENTIONS:  --  AMI PCI (CHARBEL, PTCRA, PTCA) Single  --  PTCA, Additional Branch of Coronary Artery  PROCEDURE COMPLETION: The patient tolerated the procedure well and was discharged from the cath lab   TIMING: Test started at 19:22  Test concluded at 20:23  HEMOSTASIS: The sheath was removed  The site was compressed with a Hemoband  device  Hemostasis was obtained  MEDICATIONS GIVEN: Versed (2mg/2ml), 2 mg, IV, at 19:24  Fentanyl (1OOmcg/2 ml), 50 mcg, IV, at 19:24  1% Lidocaine, 1 ml, subcutaneously, at 19:24  Nitroglycerin (200mcg/ml), 200 mcg, at 19:27  Verapamil  (5mg/2ml), 2 5 mg, IV, at 19:27  Heparin 1000 units/ml, 6,000 units, IV, at 19:33  Versed (2mg/2ml), 2 mg, IV, at 19:45  Fentanyl (1OOmcg/2 ml), 50 mcg, IV, at 19:45  Versed (2mg/2ml), 2 mg, IV, at 20:06  Fentanyl (1OOmcg/2 ml), 50 mcg,  IV, at 20:06  CONTRAST GIVEN: 75 ml Omnipaque (350mg I /ml)  130 ml Omnipaque (350 mg I /ml)  RADIATION EXPOSURE: Fluoroscopy time: 26 67 min  HEMODYNAMICS: Hemodynamic assessment demonstrated mildly elevated LVEDP  CORONARY VESSELS:   --  The coronary circulation is right dominant  --  Left main: Angiography showed no evidence of disease  --  Proximal LAD: There was a 95 % stenosis  --  1st diagonal: There was a 90 % stenosis  --  Circumflex: Angiography showed minor luminal irregularities  --  RCA: Angiography showed minor luminal irregularities  IMPRESSIONS:  PCI of LAD with CHARBEL    RECOMMENDATIONS  asa 81 indefinitely, plavix/ticagrelor x 1 year    Prepared and signed by    Lacho Rodriguez DO  Signed 08/14/2019 13:08:06    Study diagram    Angiographic findings  Native coronary lesions:  ï¾·Proximal LAD: Lesion 1: 95 % stenosis  ï¾·D1: Lesion 1: 90 % stenosis  Intervention results  Native coronary lesions:  ï¾· balloon angioplasty of proximal LAD  Stent: Mindy Zelaya Rx 3 25 x 33mm drug-eluting  ï¾·Successful balloon angioplasty of the 90 % stenosis in D1  40 % residual stenosis      Hemodynamic tables    Pressures:  Baseline  Pressures:  - HR: 67  Pressures:  - Rhythm:  Pressures:  -- Aortic Pressure (S/D/M): 116/61/78  Pressures:  -- Left Ventricle (s/edp): 110/13/--    Outputs:  Baseline  Outputs:  -- CALCULATIONS[age]  Outputs:  -- CALCULATIONS: Body Surface Area: 2 28  Outputs:  -- CALCULATIONS: Height in cm: 178 00  Outputs:  -- CALCULATIONS: Sex: Male  Outputs:  -- CALCULATIONS: Weight in k 60       EKG: Personally reviewed  Normal sinus rhythm diffuse T-wave flattening    Cristiano Easley  [phone]  Please call with any questions or suggestions    Counseling :  A description of the counseling:   Goals and Barriers:  Patient's ability to self care:  Medication side effect reviewed with patient in detail and all their questions answered  "Portions of the record may have been created with voice recognition software  Occasional wrong word or "sound a like" substitutions may have occurred due to the inherent limitations of voice recognition software  Read the chart carefully and recognize, using context, where substitutions have occurred   Please call if you have any questions  "

## 2019-11-06 NOTE — ASSESSMENT & PLAN NOTE
Incidental finding on on CTA dissection protocol, he was found to have 6 mm thrombus of left common iliac artery    Consult vascular surgery for further evaluation  Continue weight based Lovenox, DAPT and statin

## 2019-11-06 NOTE — ASSESSMENT & PLAN NOTE
Hypokalemia  Recheck in a m      Results from last 7 days   Lab Units 11/05/19 1943   POTASSIUM mmol/L 3 3*

## 2019-11-06 NOTE — ASSESSMENT & PLAN NOTE
Coronary artery disease status post recent LAD stent    Continue Lopressor, Plavix, ASA and atorvastatin  Patient reports being compliant with his medications

## 2019-11-06 NOTE — ED NOTES
Pt transported to room 417-2 via stretcher by this RN and portable tele  Verbal report given to [employer] at bedside  Pt is settled in his room, no co  Reported pain is better at this time        Maynor Hutchinson RN  11/05/19 5990

## 2019-11-06 NOTE — ASSESSMENT & PLAN NOTE
PVD, patient was complaining of right lower extremity pains but on CTA dissection protocol was found have plaque of left common iliac artery  Will have vascular surgery evaluate    Continue DA PT and statin

## 2019-11-06 NOTE — ASSESSMENT & PLAN NOTE
Bipolar disorder with increased stressors  Currently on topiramate and geodon  added lorazepam as needed

## 2019-11-06 NOTE — ASSESSMENT & PLAN NOTE
On CTA dissection protocol, he was found to have 6 mm thrombus of left common iliac artery    vascular surgery evaluation      Continue weight based Lovenox, DAPT and statin

## 2019-11-06 NOTE — H&P
H&P- Sudha Devoid 1955, 60 y o  male MRN: [id number]    Unit/Bed#: [facility] 505-01 Encounter: [id number]    Primary Care Provider: Estiven Hall   Date and time admitted to hospital: 11/6/2019  5:58 PM        * NSTEMI (non-ST elevated myocardial infarction) [facility])  Assessment & Plan  Patient with underlying CAD s/p  recent catheterization on 8/13/2019 with CHARBEL x1 to proximal LAD and angioplasty to 90% on 1st diagonal  Ongoing tobacco smoking  Presented with chest pain and non STEMI type 1  Patient was transferred to [facility] for cardiac catheterization  Continue aspirin, Plavix, statin and beta-blocker     CAD (coronary artery disease)  Assessment & Plan  Coronary artery disease status post recent LAD stent  Continue Lopressor, Plavix, ASA and atorvastatin  Patient reports being compliant with his medications    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[facility] [address] )  Assessment & Plan  Incidental finding on on CTA dissection protocol, he was found to have 6 mm thrombus of left common iliac artery    Consult vascular surgery for further evaluation  Continue weight based Lovenox, DAPT and statin    Tobacco user  Assessment & Plan  Continue nicotine patch    Bipolar 1 disorder ([facility] [address] )  Assessment & Plan  With schizoaffective disorder  Continue Topamax and Geodon    VTE Prophylaxis: Enoxaparin (Lovenox)  / sequential compression device   Code Status: full code  POLST: There is no POLST form on file for this patient (pre-hospital)  Discussion with family: no    Anticipated Length of Stay:  Patient will be admitted on an Inpatient basis with an anticipated length of stay of  > 2 midnights  Justification for Hospital Stay:  Cardiac catheterization    Total Time for Visit, including Counseling / Coordination of Care: 1 hour  Greater than 50% of this total time spent on direct patient counseling and coordination of care      Chief Complaint:     Patient was transferred to [facility] due to NSTEMI and need for cardiac catheterization    History of Present Illness:    Di Boateng is a 60 y o  male who presents as a transfer from [facility] due to non STEMI type 1 and the need for cardiac catheterization  Patient with underlying CAD, bipolar disorder status, on going tobacco smoking, s/p  recent cardiac catheterization on 8/13/2019 with CHARBEL x1 placed to 95% proximal LAD and angioplasty to 90% of 1st diagonal    He was admitted to [facility] on 11/5/2019 due to worsening chest pain, left-sided chest pain which radiates to left arm [address] with nausea vomiting and severe and anxiety  found to have elevated troponin  evaluated by Cardiology who recommended transfer to [facility] for repeat catheterization and possible PCI    Currently patient is comfortable in bed, no further chest pain    Review of Systems:    Review of Systems   Constitutional: Negative for appetite change, chills and fever  HENT: Negative for sore throat  Respiratory: Negative for shortness of breath  Cardiovascular: Positive for chest pain  Negative for palpitations and leg swelling  Gastrointestinal: Negative for abdominal pain, blood in stool, diarrhea and vomiting  Genitourinary: Negative for dysuria  Neurological: Positive for weakness  Negative for dizziness, speech difficulty and headaches  Psychiatric/Behavioral: The patient is nervous/anxious  All other systems reviewed and are negative  Past Medical and Surgical History:     Past Medical History:   Diagnosis Date    Bipolar 1 disorder ([facility] [address] )     CAD (coronary artery disease)     [facility]     Tobacco user        Past Surgical History:   Procedure Laterality Date    BACK SURGERY      CARDIAC SURGERY      stent placement    CHOLECYSTECTOMY      KNEE SURGERY Right        Meds/Allergies:    Prior to Admission medications    Medication Sig Start Date End Date Taking?  Authorizing Provider   aspirin 81 mg chewable tablet Chew 1 tablet (81 mg total) daily 8/16/19   Isabella Spears MD   atorvastatin (LIPITOR) 80 mg tablet Take 1 tablet (80 mg total) by mouth every evening 8/16/19   Carmen Ervin MD   clopidogrel (PLAVIX) 75 mg tablet Take 1 tablet (75 mg total) by mouth daily 8/16/19   Isabella Spears MD   metoprolol tartrate (LOPRESSOR) 25 mg tablet Take 0 5 tablets (12 5 mg total) by mouth every 12 (twelve) hours  Patient taking differently: Take 25 mg by mouth every 12 (twelve) hours  8/16/19   Carmen Ervin MD   nitroglycerin (NITROSTAT) 0 4 mg SL tablet Place 1 tablet (0 4 mg total) under the tongue every 5 (five) minutes as needed for chest pain 8/15/19   Isabella Spears MD   sildenafil (VIAGRA) 100 mg tablet Take 100 mg by mouth daily as needed for erectile dysfunction    Historical Provider, MD   topiramate (TOPAMAX) 100 mg tablet Take 100 mg by mouth 3 (three) times a day 7/3/19   Historical Provider, MD   ziprasidone (GEODON) 40 mg capsule Take 40 mg by mouth 2 (two) times a day 7/3/19   Historical Provider, MD     I have reviewed home medications with a medical source (PCP, Pharmacy, other)  Allergies:    Allergies   Allergen Reactions    Fish-Derived Products Hives    Rice Bean [Phaseolus] Hives       Social History:     Marital Status: Single   Patient lives alone  Substance Use History:   Social History     Substance and Sexual Activity   Alcohol Use Not Currently     Social History     Tobacco Use   Smoking Status Heavy Tobacco Smoker    Packs/day: 1 50    Types: Cigarettes   Smokeless Tobacco Never Used     Social History     Substance and Sexual Activity   Drug Use Never       Family History:    non-contributory    Physical Exam:     Vitals:   Blood Pressure: 118/58 (11/06/19 1808)  Pulse: 59 (11/06/19 1808)  Temperature: 98 4 °F (36 9 °C) (11/06/19 1808)  Temp Source: Oral (11/06/19 1808)  Respirations: 17 (11/06/19 1808)  Height: 5' 10" (177 8 cm) (11/06/19 1808)  Weight - Scale: 104 kg (229 lb 3 2 oz) (11/06/19 1808)  SpO2: 96 % (11/06/19 1808)    Physical Exam   Constitutional: He is oriented to person, place, and time  He appears well-developed  HENT:   Head: Normocephalic and atraumatic  Mouth/Throat: Oropharynx is clear and moist  No oropharyngeal exudate  Eyes: Conjunctivae and EOM are normal  No scleral icterus  Neck: Normal range of motion  Neck supple  No JVD present  Cardiovascular: Normal rate, regular rhythm, normal heart sounds and intact distal pulses  Pulmonary/Chest: Effort normal and breath sounds normal  No respiratory distress  He has no wheezes  Abdominal: Soft  Bowel sounds are normal  He exhibits no distension  There is no tenderness  There is no rebound  Musculoskeletal: Normal range of motion  He exhibits no edema or tenderness  Lymphadenopathy:     He has no cervical adenopathy  Neurological: He is alert and oriented to person, place, and time  No cranial nerve deficit  Skin: Skin is warm and dry  No rash noted  Long toenails   Psychiatric: He has a normal mood and affect  Additional Data:     Lab Results: I have personally reviewed pertinent reports  Results from last 7 days   Lab Units 11/06/19 0606 11/05/19  1943   WBC Thousand/uL 9 17 15 30*   HEMOGLOBIN g/dL 15 8 16 0   HEMATOCRIT % 47 9 47 6   PLATELETS Thousands/uL 234 247   NEUTROS PCT %  --  83*   LYMPHS PCT %  --  12*   MONOS PCT %  --  5   EOS PCT %  --  0     Results from last 7 days   Lab Units 11/06/19  0606   SODIUM mmol/L 136   POTASSIUM mmol/L 3 9   CHLORIDE mmol/L 103   CO2 mmol/L 26   BUN mg/dL 5   CREATININE mg/dL 0 85   ANION GAP mmol/L 7   CALCIUM mg/dL 8 7   ALBUMIN g/dL 3 0*   TOTAL BILIRUBIN mg/dL 0 60   ALK PHOS U/L 69   ALT U/L 17   AST U/L 25   GLUCOSE RANDOM mg/dL 94                       Imaging: I have personally reviewed pertinent reports        No orders to display       EKG, Pathology, and Other Studies Reviewed on Admission:   · yes    Allscripts / [address] Records Reviewed: Yes     ** Please Note: This note has been constructed using a voice recognition system   **

## 2019-11-06 NOTE — ASSESSMENT & PLAN NOTE
Bipolar disorder with increased stressors  Currently on topiramate and geodon  Will add lorazepam as needed

## 2019-11-06 NOTE — ASSESSMENT & PLAN NOTE
Chest pain with elevated troponin concerning for NSTEMI  Patient did have cardiac catheterization with CHARBEL of LAD 08/2019  He states that he has been compliant with all medications including antiplatelets and statin  Unfortunately still smokes 1 1/2 PPD  Case was discussed by emergency department physician with cardiologist   Continue weight based lovenox q 12 hours and trend cardiac enzymes  Symptoms may also have been precipitated by psychiatric disorder and increased stressors      Results from last 7 days   Lab Units 11/05/19  1943   TROPONIN I ng/mL 1 15*

## 2019-11-06 NOTE — ASSESSMENT & PLAN NOTE
Chest pain with elevated troponin concerning for NSTEMI type 1  Patient did have cardiac catheterization with CHARBEL of LAD 08/2019  He states that he has been compliant with all medications including antiplatelets and statin  However he still smokes 1 1/2 PPD  Case was discussed with cardiologist and patient being transferred to [facility] for further evaluation    Continue weight based lovenox q 12 hours, aspirin, Plavix, statin, Lopressor    Results from last 7 days   Lab Units 11/06/19  1021 11/06/19  0606 11/06/19  0124 11/05/19  2237 11/05/19  1943   TROPONIN I ng/mL 4 38* 6 07* 5 49* 4 89* 1 15*

## 2019-11-06 NOTE — H&P
H&P- Gwendolyn Bustillos 1955, 60 y o  male MRN: [id number]  Unit/Bed#: ED 03 Encounter: [id number]  Primary Care Provider: Estiven Hicks   Date and time admitted to hospital: 11/5/2019  7:20 PM        Assessment and Plan  * Unstable angina pectoris due to coronary arteriosclerosis [facility])  Assessment & Plan  Chest pain with elevated troponin concerning for NSTEMI  Patient did have cardiac catheterization with CHARBEL of LAD 08/2019  He states that he has been compliant with all medications including antiplatelets and statin  Unfortunately still smokes 1 1/2 PPD  Case was discussed by emergency department physician with cardiologist   Continue weight based lovenox q 12 hours and trend cardiac enzymes  Symptoms may also have been precipitated by psychiatric disorder and increased stressors  Results from last 7 days   Lab Units 11/05/19  1943   TROPONIN I ng/mL 1 15*       PVD (peripheral vascular disease) ([facility])  Assessment & Plan  PVD, patient was complaining of right lower extremity pains but on CTA dissection protocol was found have plaque of left common iliac artery  Will have vascular surgery evaluate  Continue DA PT and statin    Hypokalemia  Assessment & Plan  Hypokalemia  Recheck in a m  Results from last 7 days   Lab Units 11/05/19  1943   POTASSIUM mmol/L 3 3*       Tobacco user  Assessment & Plan  Tobacco user  Advised cessation  Nicotine patch to be ordered    Bipolar 1 disorder [facility])  Assessment & Plan  Bipolar disorder with increased stressors  Currently on topiramate and geodon  Will add lorazepam as needed  CAD (coronary artery disease)  Assessment & Plan  Coronary artery disease status post stent LAD  Continue DAPT and atorvastatin    VTE Prophylaxis: Enoxaparin (Lovenox)  Code Status: Level 1 - Full Code  Anticipated Length of Stay:  Patient will be admitted on an Observation basis with an anticipated length of stay of  less than 2 midnights       Justification for Hospital Stay: Unstable angina pectoris due to coronary arteriosclerosis ([facility] [address] )  Total Time for Visit, including Counseling / Coordination of Care: 40 mins  Greater than 50% of this total time spent on direct patient counseling and coordination of care  Chief Complaint:     Chief Complaint   Patient presents with    Chest Pain     pt c/o mind chest pain yesterday, today felt pain going down left arm & became nauseous  said it took his girlfriend over an hour to call 911  pt had stent placement 10 weeks ago   Knee Pain     History of Present Illness:    Brent Bonilla is a 60 y o  male who presents with worsening chest pain  The patient has significant psychiatric history with bipolar depression on geodon and topiramate  He states that he has had increased stressors including with finances and relations with his girlfriend  Of note the patient did have a cardiac catheterization and had findings of LAD stenosis requiring CHARBEL x1  He states that he has been compliant with medications  Symptoms began yesterday with left-sided chest pain which radiates to left arm  He also has been nauseous and vomiting due to severe anxiety  In the emergency department he was found have elevated troponin and case was discussed with cardiology by ED physician  At time of examination he still having chest pain but improved  He is also complaining of right leg pains      Review of Systems:  History obtained from chart review, the patient and girlfriend on phone  General ROS: negative for - chills or fever  Psychological ROS: positive for - anxiety, depression and irritability  Ophthalmic ROS: negative for - loss of vision  Respiratory ROS: negative for - cough or shortness of breath  Cardiovascular ROS: positive for - chest pain  Gastrointestinal ROS: positive for - appetite loss  Genito-Urinary ROS: negative for - dysuria or hematuria  Musculoskeletal ROS: positive for - muscle pain and muscular weakness  Neurological ROS: negative for - seizures  Otherwise, all other 12 point review of systems normal     Past Medical and Surgical History:   Past Medical History:   Diagnosis Date    Bipolar 1 disorder ([facility] [address] )     CAD (coronary artery disease)     Schizo-affective psychosis ([facility] [address] )     Tobacco user      Past Surgical History:   Procedure Laterality Date    BACK SURGERY      CARDIAC SURGERY      stent placement    CHOLECYSTECTOMY      KNEE SURGERY Right      Meds/Allergies: Allergies: Allergies   Allergen Reactions    Fish-Derived Products      Prior to Admission Medications   Prescriptions Last Dose Informant Patient Reported?  Taking?   aspirin 81 mg chewable tablet 11/5/2019 at Unknown time Self No Yes   Sig: Chew 1 tablet (81 mg total) daily   atorvastatin (LIPITOR) 80 mg tablet 11/5/2019 at Unknown time  No Yes   Sig: Take 1 tablet (80 mg total) by mouth every evening   clopidogrel (PLAVIX) 75 mg tablet 11/5/2019 at Unknown time Self No Yes   Sig: Take 1 tablet (75 mg total) by mouth daily   metoprolol tartrate (LOPRESSOR) 25 mg tablet 11/5/2019 at Unknown time  No Yes   Sig: Take 0 5 tablets (12 5 mg total) by mouth every 12 (twelve) hours   Patient taking differently: Take 25 mg by mouth every 12 (twelve) hours    nitroglycerin (NITROSTAT) 0 4 mg SL tablet Unknown at Unknown time Self No No   Sig: Place 1 tablet (0 4 mg total) under the tongue every 5 (five) minutes as needed for chest pain   sildenafil (VIAGRA) 100 mg tablet Past Month at Unknown time Self Yes Yes   Sig: Take 100 mg by mouth daily as needed for erectile dysfunction   topiramate (TOPAMAX) 100 mg tablet 11/5/2019 at Unknown time Self Yes Yes   Sig: Take 100 mg by mouth 3 (three) times a day   ziprasidone (GEODON) 40 mg capsule 11/5/2019 at Unknown time Self Yes Yes   Sig: Take 40 mg by mouth 2 (two) times a day      Facility-Administered Medications: None     Social History:     Social History     Socioeconomic History    Marital status: Single Spouse name: Not on file    Number of children: Not on file    Years of education: Not on file    Highest education level: Not on file   Occupational History    Occupation: none     Employer: none   Social Needs    Financial resource strain: Not on file    Food insecurity:     Worry: Not on file     Inability: Not on file    Transportation needs:     Medical: Not on file     Non-medical: Not on file   Tobacco Use    Smoking status: Heavy Tobacco Smoker     Packs/day: 1 50     Types: Cigarettes    Smokeless tobacco: Never Used   Substance and Sexual Activity    Alcohol use: Not Currently    Drug use: Never    Sexual activity: Not on file   Lifestyle    Physical activity:     Days per week: Not on file     Minutes per session: Not on file    Stress: Not on file   Relationships    Social connections:     Talks on phone: Not on file     Gets together: Not on file     Attends Yarsanism service: Not on file     Active member of club or organization: Not on file     Attends meetings of clubs or organizations: Not on file     Relationship status: Not on file    Intimate partner violence:     Fear of current or ex partner: Not on file     Emotionally abused: Not on file     Physically abused: Not on file     Forced sexual activity: Not on file   Other Topics Concern    Not on file   Social History Narrative    Not on file     Patient Pre-hospital Living Situation:   Patient Pre-hospital Level of Mobility:   Patient Pre-hospital Diet Restrictions:     Family History:  Family History   Problem Relation Age of Onset    Heart disease Mother     Heart disease Father     Multiple sclerosis Sister      Physical Exam:   Vitals:   Blood Pressure: 105/54 (11/05/19 2251)  Pulse: 66 (11/05/19 2251)  Temperature: 98 8 °F (37 1 °C) (11/05/19 1930)  Temp Source: Tympanic (11/05/19 1930)  Respirations: 22 (11/05/19 2145)  Height: 5' 5" (165 1 cm) (11/05/19 1927)  Weight - Scale: 109 kg (241 lb) (11/05/19 1927)  SpO2: 96 % (11/05/19 2251)    General appearance: alert, appears stated age and cooperative  Skin: Skin color, texture, turgor normal  No rashes or lesions  Head: atraumatic  Eyes: conjunctivae/corneas clear  PERRL, EOM's intact  Lungs: diminished breath sounds  Heart: regular rate and rhythm  Abdomen: soft, non-tender; bowel sounds normal; no masses,  no organomegaly  Back: range of motion normal  Extremities: edema trace to +1 lower extremities bilaterally  Neurologic: Grossly normal  Psychiatric:  Anxious appearing    Lab Results: I have personally reviewed pertinent reports  Results from last 7 days   Lab Units 11/05/19 1943   WBC Thousand/uL 15 30*   HEMOGLOBIN g/dL 16 0   HEMATOCRIT % 47 6   PLATELETS Thousands/uL 247   NEUTROS PCT % 83*   LYMPHS PCT % 12*   MONOS PCT % 5   EOS PCT % 0     Results from last 7 days   Lab Units 11/05/19 1943   SODIUM mmol/L 136   POTASSIUM mmol/L 3 3*   CHLORIDE mmol/L 102   CO2 mmol/L 23   ANION GAP mmol/L 11   BUN mg/dL 6   CREATININE mg/dL 1 09   CALCIUM mg/dL 8 8   ALBUMIN g/dL 3 1*   TOTAL BILIRUBIN mg/dL 0 40   ALK PHOS U/L 75   ALT U/L 20   AST U/L 34   EGFR ml/min/1 73sq m 72   GLUCOSE RANDOM mg/dL 107         Results from last 7 days   Lab Units 11/05/19 1943   TROPONIN I ng/mL 1 15*             Results from last 7 days   Lab Units 11/05/19 1943   NT-PRO BNP pg/mL 253*             Imaging: I have personally reviewed pertinent films in PACS  Xr Chest 1 View Portable  Result Date: 11/5/2019  Impression: No acute abnormality in the chest  Workstation performed: [id number]     Cta Dissection Protocol Chest And Abdomen  Result Date: 11/5/2019  Impression: 1  No evidence of aneurysm, dissection or pulmonary embolus  2  Severe atherosclerotic plaque LAD coronary artery  3  Prominent 6 mm thrombus projecting into the lumen along the medial luminal aspect of the left common iliac artery   Workstation performed: [id number]       EKG, Pathology, and Other Studies Reviewed on Admission:   EKG  Result Date: 11/05/19  Personally reviewed strips with impression of:  Normal sinus rhythm 80 bpm    Allscripts/ Epic Records Reviewed: Yes    ** Please Note: This note has been constructed using a voice recognition system   **

## 2019-11-06 NOTE — UTILIZATION REVIEW
Initial Clinical Review    Admission: Date/Time/Statement:   Orders Placed This Encounter   Procedures    Place in Observation (expected length of stay for this patient is less than two midnights)     Standing Status:   Standing     Number of Occurrences:   1     Order Specific Question:   Admitting Physician     Answer:   Naun Cornell [[id number]]     Order Specific Question:   Level of Care     Answer:   Med Surg [[id number]]     ED Arrival Information     Expected Arrival Acuity Means of Arrival Escorted By Service Admission Type    - 11/5/2019 19:18 Urgent Ambulance [address] Urgent    Arrival Complaint    Chest Pain        Chief Complaint   Patient presents with    Chest Pain     pt c/o mind chest pain yesterday, today felt pain going down left arm & became nauseous  said it took his girlfriend over an hour to call 911  pt had stent placement 10 weeks ago   Knee Pain     Assessment/Plan:   Assessment and Plan  * Unstable angina pectoris due to coronary arteriosclerosis [facility])  Assessment & Plan  Chest pain with elevated troponin concerning for NSTEMI  Patient did have cardiac catheterization with CHARBEL of LAD 08/2019  He states that he has been compliant with all medications including antiplatelets and statin  Unfortunately still smokes 1 1/2 PPD  Case was discussed by emergency department physician with cardiologist   Continue weight based lovenox q 12 hours and trend cardiac enzymes  Symptoms may also have been precipitated by psychiatric disorder and increased stressors           Results from last 7 days   Lab Units 11/05/19  1943   TROPONIN I ng/mL 1 15*         PVD (peripheral vascular disease) ([facility])  Assessment & Plan  PVD, patient was complaining of right lower extremity pains but on CTA dissection protocol was found have plaque of left common iliac artery  Will have vascular surgery evaluate  Continue DA PT and statin     Hypokalemia  Assessment & Plan  Hypokalemia  Recheck in a m           Results from last 7 days   Lab Units 11/05/19 1943   POTASSIUM mmol/L 3 3*         Tobacco user  Assessment & Plan  Tobacco user  Advised cessation  Nicotine patch to be ordered     Bipolar 1 disorder ([address] )  Assessment & Plan  Bipolar disorder with increased stressors  Currently on topiramate and geodon  Will add lorazepam as needed      CAD (coronary artery disease)  Assessment & Plan  Coronary artery disease status post stent LAD  Continue DAPT and atorvastatin   ADMITTED OBSERVATION STATUS:  Symptoms began yesterday with left-sided chest pain which radiates to left arm  He also has been nauseous and vomiting due to severe anxiety  In the emergency department he was found have elevated troponin and case was discussed with cardiology by ED physician  At time of examination he still having chest pain but improved  He is also complaining of right leg pains  VTE Prophylaxis: Enoxaparin (Lovenox)  Code Status: Level 1 - Full Code  Anticipated Length of Stay:  Patient will be admitted on an Observation basis with an anticipated length of stay of  less than 2 midnights         ED Triage Vitals   Temperature Pulse Respirations Blood Pressure SpO2   11/05/19 1930 11/05/19 1927 11/05/19 1927 11/05/19 1927 11/05/19 1927   98 8 °F (37 1 °C) 80 20 110/73 96 %      Temp Source Heart Rate Source Patient Position - Orthostatic VS BP Location FiO2 (%)   11/05/19 1930 11/05/19 1927 11/05/19 1927 11/05/19 1927 --   Tympanic Monitor Lying Right arm       Pain Score       11/05/19 1927       No Pain        Wt Readings from Last 1 Encounters:   11/05/19 109 kg (241 lb)     Additional Vital Signs:   11/06/19 0421  97 6 °F (36 4 °C)  58  18  108/57  95 %  None (Room air)  Lying   11/06/19 0009    63  20  105/52  94 %  None (Room air)  Lying   11/05/19 2251    66    105/54  96 %  None (Room air)     11/05/19 2145    74  22  107/56  96 %       11/05/19 2130    72  19  118/67  95 % Pertinent Labs/Diagnostic Test Results:   Results from last 7 days   Lab Units 11/06/19  0606 11/05/19  1943   WBC Thousand/uL 9 17 15 30*   HEMOGLOBIN g/dL 15 8 16 0   HEMATOCRIT % 47 9 47 6   PLATELETS Thousands/uL 234 247   NEUTROS ABS Thousands/µL  --  12 66*         Results from last 7 days   Lab Units 11/06/19  0606 11/05/19  1943   SODIUM mmol/L 136 136   POTASSIUM mmol/L 3 9 3 3*   CHLORIDE mmol/L 103 102   CO2 mmol/L 26 23   ANION GAP mmol/L 7 11   BUN mg/dL 5 6   CREATININE mg/dL 0 85 1 09   EGFR ml/min/1 73sq m 93 72   CALCIUM mg/dL 8 7 8 8   MAGNESIUM mg/dL  --  2 1     Results from last 7 days   Lab Units 11/06/19  0606 11/05/19  1943   AST U/L 25 34   ALT U/L 17 20   ALK PHOS U/L 69 75   TOTAL PROTEIN g/dL 6 8 7 1   ALBUMIN g/dL 3 0* 3 1*   TOTAL BILIRUBIN mg/dL 0 60 0 40     Results from last 7 days   Lab Units 11/06/19  0606 11/06/19  0124 11/05/19  2237 11/05/19  1943   TROPONIN I ng/mL 6 07* 5 49* 4 89* 1 15*     Results from last 7 days   Lab Units 11/05/19  1943   TSH 3RD GENERATON uIU/mL 1 596     Results from last 7 days   Lab Units 11/05/19  1943   NT-PRO BNP pg/mL 253*     Results from last 7 days   Lab Units 11/05/19  1943   LIPASE u/L 34*     ED Treatment:   Medication Administration from 11/05/2019 1918 to 11/05/2019 2215       Date/Time Order Dose Route Action Action by Comments     11/05/2019 2116 nitroglycerin (NITROSTAT) SL tablet 0 4 mg 0 4 mg Sublingual Not Given Kathy Smith RN As per Dr Regi Escudero, /66     11/05/2019 2103 iohexol (OMNIPAQUE) 350 MG/ML injection (MULTI-DOSE) 100 mL 100 mL Intravenous Given Mayte Glass      11/05/2019 2120 fentanyl citrate (PF) 100 MCG/2ML 100 mcg 100 mcg Intravenous Given Óscar Bernard RN      11/05/2019 2158 aspirin chewable tablet 324 mg 324 mg Oral Given Óscar Bernard RN      11/05/2019 2159 enoxaparin (LOVENOX) subcutaneous injection 105 mg 105 mg Subcutaneous Given Óscar Bernard RN         Past Medical History:   Diagnosis Date  Bipolar 1 disorder ([place] [address] )     CAD (coronary artery disease)     Schizo-affective psychosis ([place] [address] )     Tobacco user      Present on Admission:   CAD (coronary artery disease)   Bipolar 1 disorder ([place] [address] )   Tobacco user   Unstable angina pectoris due to coronary arteriosclerosis ([place] [address] )   Hypokalemia   PVD (peripheral vascular disease) ([place] [address] )      Admitting Diagnosis: Knee pain [M25 569]  Chest pain [R07 9]  PAD (peripheral artery disease) ([place] [address] ) [I73 9]  Age/Sex: 60 y o  male  Admission Orders:  OBSERVATION  TELE MON    Scheduled Medications:    Medications:  aspirin 81 mg Oral Daily   atorvastatin 80 mg Oral QPM   clopidogrel 75 mg Oral Daily   enoxaparin 105 mg Subcutaneous Q12H [address]   metoprolol tartrate 25 mg Oral Q12H [address]   nicotine 1 patch Transdermal Daily   topiramate 100 mg Oral TID   ziprasidone 40 mg Oral BID     Continuous IV Infusions:     PRN Meds:    diphenhydrAMINE 25 mg Oral Q6H PRN   docusate sodium 100 mg Oral BID PRN   hydrALAZINE 10 mg Intravenous Q4H PRN   levalbuterol 1 25 mg Nebulization Q6H PRN   LORazepam 0 5 mg Intravenous Q4H PRN   magnesium hydroxide 30 mL Oral BID PRN   morphine injection 2 mg Intravenous Q4H PRN   nitroglycerin 0 4 mg Sublingual Q5 Min PRN   ondansetron 4 mg Intravenous Q4H PRN   oxyCODONE 5 mg Oral Q4H PRN       IP CONSULT TO CARDIOLOGY  IP CONSULT TO VASCULAR SURGERY    Network Utilization Review Department  [email or web address]  ATTENTION: Please call with any questions or concerns to [phone] and carefully listen to the prompts so that you are directed to the right person  All voicemails are confidential   Teresa Medina all requests for admission clinical reviews, approved or denied determinations and any other requests to dedicated fax number below belonging to the campus where the patient is receiving treatment    FACILITY NAME UR FAX NUMBER   ADMISSION DENIALS (Administrative/Medical Necessity) [phone]   [address] (Maternity/NICU/Pediatrics) [phone]   [facility] [phone]   Selina Galindo [phone]   Swetha Ramirez [phone]   Luis Miguel Tay [place] [id number] [address] [phone]

## 2019-11-06 NOTE — ED PROVIDER NOTES
History  Chief Complaint   Patient presents with    Chest Pain     pt c/o mind chest pain yesterday, today felt pain going down left arm & became nauseous  said it took his girlfriend over an hour to call 911  pt had stent placement 10 weeks ago   Knee Pain     59 y/o male presents with chest pain started midsternal into his shoulder blades and also radiating down his left arm today currently describes a pressure in chest, 5/10 nothing makes it better or worse, denies dyspnea, no pleursiy, cough,congestion or any other symptoms  Patient has a history of CAD, with LAD stent placed 10 weeks ago  Denies any vomiting, nausea, abdominal pain,diarrhea,chest trauma or any other symptoms  Is on plavix says he has been compliant with medications  History provided by:  Patient   used: No        Prior to Admission Medications   Prescriptions Last Dose Informant Patient Reported?  Taking?   aspirin 81 mg chewable tablet 11/5/2019 at Unknown time Self No Yes   Sig: Chew 1 tablet (81 mg total) daily   atorvastatin (LIPITOR) 80 mg tablet 11/5/2019 at Unknown time  No Yes   Sig: Take 1 tablet (80 mg total) by mouth every evening   clopidogrel (PLAVIX) 75 mg tablet 11/5/2019 at Unknown time Self No Yes   Sig: Take 1 tablet (75 mg total) by mouth daily   metoprolol tartrate (LOPRESSOR) 25 mg tablet 11/5/2019 at Unknown time  No Yes   Sig: Take 0 5 tablets (12 5 mg total) by mouth every 12 (twelve) hours   Patient taking differently: Take 25 mg by mouth every 12 (twelve) hours    nitroglycerin (NITROSTAT) 0 4 mg SL tablet Unknown at Unknown time Self No No   Sig: Place 1 tablet (0 4 mg total) under the tongue every 5 (five) minutes as needed for chest pain   sildenafil (VIAGRA) 100 mg tablet Past Month at Unknown time Self Yes Yes   Sig: Take 100 mg by mouth daily as needed for erectile dysfunction   topiramate (TOPAMAX) 100 mg tablet 11/5/2019 at Unknown time Self Yes Yes   Sig: Take 100 mg by mouth 3 Isidro Rios is a 71 year old male presenting for   Chief Complaint   Patient presents with   • Follow-up     f/u should pain, requesting cortisone inju     Denies Latex allergy or sensitivity.    Medication verified and med list updated  Refills needed today: No    Health Maintenance Summary     Topic Due On Due Status Completed On Postpone Until Reason    Colorectal Cancer Screening - Colonoscopy May 26, 2021 Not Due May 26, 2011      Immunization-Zoster  Completed Jun 26, 2012      Immunization - Pneumococcal Jan 12, 2013 Postponed Dec 1, 2009 Dec 1, 2017 Patient Refused    Abdominal Aortic Aneurysm (AAA) Screening   Completed Apr 20, 2015      Medicare Wellness Visit Apr 19, 2017 Due Soon Apr 19, 2016      IMMUNIZATION - DTaP/Tdap/Td Apr 20, 2016 Overdue Apr 19, 2016      Immunization-Influenza Sep 1, 2016 Postponed Oct 17, 2014 Dec 1, 2017 Patient Refused            Patient is due for topics as listed above, he wishes to discuss with provider.       (three) times a day   ziprasidone (GEODON) 40 mg capsule 11/5/2019 at Unknown time Self Yes Yes   Sig: Take 40 mg by mouth 2 (two) times a day      Facility-Administered Medications: None       Past Medical History:   Diagnosis Date    [facility]     CAD (coronary artery disease)     Sch[facility]affective psychosis ([facility] )     Tobacco user        Past Surgical History:   Procedure Laterality Date    BACK SURGERY      CARDIAC SURGERY      stent placement    CHOLECYSTECTOMY      KNEE SURGERY Right        Family History   Problem Relation Age of Onset    Heart disease Mother     Heart disease Father     Multiple sclerosis Sister      I have reviewed and agree with the history as documented  Social History     Tobacco Use    Smoking status: Heavy Tobacco Smoker     Packs/day: 1 50     Types: Cigarettes    Smokeless tobacco: Never Used   Substance Use Topics    Alcohol use: Not Currently     Frequency: Never    Drug use: Never        Review of Systems   All other systems reviewed and are negative  Physical Exam  Physical Exam   Constitutional: He is oriented to person, place, and time  He appears well-developed and well-nourished  HENT:   Head: Normocephalic and atraumatic  Eyes: Pupils are equal, round, and reactive to light  EOM are normal    Neck: Normal range of motion  Neck supple  Cardiovascular: Normal rate and regular rhythm  Pulmonary/Chest: Effort normal and breath sounds normal    Abdominal: Soft  Bowel sounds are normal    Musculoskeletal: Normal range of motion  Neurological: He is alert and oriented to person, place, and time  Skin: Skin is warm and dry  Psychiatric: He has a normal mood and affect  Nursing note and vitals reviewed        Vital Signs  ED Triage Vitals   Temperature Pulse Respirations Blood Pressure SpO2   11/05/19 1930 11/05/19 1927 11/05/19 1927 11/05/19 1927 11/05/19 1927   98 8 °F (37 1 °C) 80 20 110/73 96 %      Temp Source Heart Rate Source Patient Position - Orthostatic VS BP Location FiO2 (%)   11/05/19 1930 11/05/19 1927 11/05/19 1927 11/05/19 1927 --   Tympanic Monitor Lying Right arm       Pain Score       11/05/19 1927       No Pain           Vitals:    11/06/19 0525 11/06/19 0945 11/06/19 1224 11/06/19 1538   BP:  114/58 117/58 (!) 112/44   Pulse: 60 64 (!) 54 56   Patient Position - Orthostatic VS:  Lying Lying Lying         Visual Acuity  Visual Acuity      Most Recent Value   L Pupil Size (mm)  3   R Pupil Size (mm)  3          ED Medications  Medications   iohexol (OMNIPAQUE) 350 MG/ML injection (MULTI-DOSE) 100 mL (100 mL Intravenous Given 11/5/19 2103)   fentanyl citrate (PF) 100 MCG/2ML 100 mcg (100 mcg Intravenous Given 11/5/19 2120)   aspirin chewable tablet 324 mg (324 mg Oral Given 11/5/19 2158)   enoxaparin (LOVENOX) subcutaneous injection 105 mg (105 mg Subcutaneous Given 11/5/19 2159)       Diagnostic Studies  Results Reviewed     Procedure Component Value Units Date/Time    TSH, 3rd generation [[id number]]  (Normal) Collected:  11/05/19 1943    Lab Status:  Final result Specimen:  Blood from Arm, Right Updated:  11/05/19 2245     TSH 3RD GENERATON 1 596 uIU/mL     Narrative:       Patients undergoing fluorescein dye angiography may retain small amounts of fluorescein in the body for 48-72 hours post procedure  Samples containing fluorescein can produce falsely depressed TSH values  If the patient had this procedure,a specimen should be resubmitted post fluorescein clearance        Magnesium [[id number]]  (Normal) Collected:  11/05/19 1943    Lab Status:  Final result Specimen:  Blood from Arm, Right Updated:  11/05/19 2015     Magnesium 2 1 mg/dL     Lipase [[id number]]  (Abnormal) Collected:  11/05/19 1943    Lab Status:  Final result Specimen:  Blood from Arm, Right Updated:  11/05/19 2015     Lipase 34 u/L     NT-BNP PRO [[id number]]  (Abnormal) Collected:  11/05/19 1943    Lab Status:  Final result Specimen:  Blood from Arm, Right Updated:  11/05/19 2015     NT-proBNP 253 pg/mL     Troponin I [[id number]]  (Abnormal) Collected:  11/05/19 1943    Lab Status:  Final result Specimen:  Blood from Arm, Right Updated:  11/05/19 2014     Troponin I 1 15 ng/mL     Comprehensive metabolic panel [[id number]]  (Abnormal) Collected:  11/05/19 1943    Lab Status:  Final result Specimen:  Blood from Arm, Right Updated:  11/05/19 2009     Sodium 136 mmol/L      Potassium 3 3 mmol/L      Chloride 102 mmol/L      CO2 23 mmol/L      ANION GAP 11 mmol/L      BUN 6 mg/dL      Creatinine 1 09 mg/dL      Glucose 107 mg/dL      Calcium 8 8 mg/dL      AST 34 U/L      ALT 20 U/L      Alkaline Phosphatase 75 U/L      Total Protein 7 1 g/dL      Albumin 3 1 g/dL      Total Bilirubin 0 40 mg/dL      eGFR 72 ml/min/1 73sq m     Narrative:       [place] guidelines for Chronic Kidney Disease (CKD):     Stage 1 with normal or high GFR (GFR > 90 mL/min/1 73 square meters)    Stage 2 Mild CKD (GFR = 60-89 mL/min/1 73 square meters)    Stage 3A Moderate CKD (GFR = 45-59 mL/min/1 73 square meters)    Stage 3B Moderate CKD (GFR = 30-44 mL/min/1 73 square meters)    Stage 4 Severe CKD (GFR = 15-29 mL/min/1 73 square meters)    Stage 5 End Stage CKD (GFR <15 mL/min/1 73 square meters)  Note: GFR calculation is accurate only with a steady state creatinine    CBC and differential [[id number]]  (Abnormal) Collected:  11/05/19 1943    Lab Status:  Final result Specimen:  Blood from Arm, Right Updated:  11/05/19 1951     WBC 15 30 Thousand/uL      RBC 4 83 Million/uL      Hemoglobin 16 0 g/dL      Hematocrit 47 6 %      MCV 99 fL      MCH 33 1 pg      MCHC 33 6 g/dL      RDW 13 7 %      MPV 10 2 fL      Platelets 685 Thousands/uL      nRBC 0 /100 WBCs      Neutrophils Relative 83 %      Immat GRANS % 0 %      Lymphocytes Relative 12 %      Monocytes Relative 5 %      Eosinophils Relative 0 %      Basophils Relative 0 %      Neutrophils Absolute 12 66 Thousands/µL      Immature Grans Absolute 0 05 Thousand/uL      Lymphocytes Absolute 1 80 Thousands/µL      Monocytes Absolute 0 70 Thousand/µL      Eosinophils Absolute 0 05 Thousand/µL      Basophils Absolute 0 04 Thousands/µL                  CTA dissection protocol chest and abdomen   Final Result by Vincenzo Rodriguez MD (11/05 2120)         1  No evidence of aneurysm, dissection or pulmonary embolus  2  Severe atherosclerotic plaque LAD coronary artery  3  Prominent 6 mm thrombus projecting into the lumen along the medial luminal aspect of the left common iliac artery  Workstation performed: [id number]         XR chest 1 view portable   Final Result by Rachel Adan MD (11/05 2034)      No acute abnormality in the chest             Workstation performed: [id number]                    Procedures  ECG 12 Lead Documentation Only  Performed by: Michelle Serrano DO  Authorized by: Michelle Serrano DO     ECG reviewed by me, the ED Provider: yes    Patient location:  ED  Previous ECG:     Previous ECG:  Unavailable    Comparison to cardiac monitor: Yes    Interpretation:     Interpretation: non-specific    Rate:     ECG rate assessment: normal    Rhythm:     Rhythm: sinus rhythm    Ectopy:     Ectopy: none    QRS:     QRS axis:  Normal  Conduction:     Conduction: normal    ST segments:     ST segments:  Non-specific  T waves:     T waves: non-specific             ED Course                               MDM  Number of Diagnoses or Management Options  Chest pain:   Diagnosis management comments: Patient evaluated with EKG, labs, imaging  Spoke cardiology Dr Jah Adan, no need to transfer at this time for cardiac catherization  Given aspirin and lovenox  Reviewed results discussed with patient  Patient improved with symptoms, admitted to hospitalist service for further evaluation and management  Patient verbalized understanding of results and agreed with the plan         Amount and/or Complexity of Data Reviewed  Clinical lab tests: ordered and reviewed  Tests in the radiology section of CPT®: ordered and reviewed  Tests in the medicine section of CPT®: ordered and reviewed    Patient Progress  Patient progress: stable      Disposition  Final diagnoses:   Chest pain     Time reflects when diagnosis was documented in both MDM as applicable and the Disposition within this note     Time User Action Codes Description Comment    11/5/2019  9:36 PM EdilmapuHerber Pump Add [R07 9] Chest pain     11/5/2019  9:55 PM Izabel Mcbrides Add [I73 9] PAD (peripheral artery disease) [facility])       ED Disposition     ED Disposition Condition Date/Time Comment    Admit Stable Tue Nov 5, 2019  9:36 PM       MD Documentation      Most Recent Value   Patient Condition  The patient has been stabilized such that within reasonable medical probability, no material deterioration of the patient condition or the condition of the unborn child(thony) is likely to result from the transfer   Reason for Transfer  Level of Care needed not available at this facility   Benefits of Transfer  Specialized equipment and/or services available at the receiving facility (Include comment)________________________   Risks of Transfer  Potential for delay in receiving treatment, Potential deterioration of medical condition, Loss of IV, Increased discomfort during transfer, Possible worsening of condition or death during transfer   Accepting Physician  Dr Sara Plasencia Name, [address] [place] [address]   Sending MD Dr Jacinto Cobian   Provider Certification  General risk, such as traffic hazards, adverse weather conditions, rough terrain or turbulence, possible failure of equipment (including vehicle or aircraft), or consequences of actions of persons outside the control of the transport personnel, Unanticipated needs of medical equipment and personnel during transport, Risk of worsening condition      RN Documentation      Most Recent Value   Accepting Facility Name, [address] [place] [address]      Follow-up Information    None         Discharge Medication List as of 11/6/2019  5:11 PM      CONTINUE these medications which have NOT CHANGED    Details   aspirin 81 mg chewable tablet Chew 1 tablet (81 mg total) daily, Starting Fri 8/16/2019, Normal      atorvastatin (LIPITOR) 80 mg tablet Take 1 tablet (80 mg total) by mouth every evening, Starting Fri 8/16/2019, Normal      clopidogrel (PLAVIX) 75 mg tablet Take 1 tablet (75 mg total) by mouth daily, Starting Fri 8/16/2019, Normal      metoprolol tartrate (LOPRESSOR) 25 mg tablet Take 0 5 tablets (12 5 mg total) by mouth every 12 (twelve) hours, Starting Fri 8/16/2019, Normal      nitroglycerin (NITROSTAT) 0 4 mg SL tablet Place 1 tablet (0 4 mg total) under the tongue every 5 (five) minutes as needed for chest pain, Starting Thu 8/15/2019, Normal      topiramate (TOPAMAX) 100 mg tablet Take 100 mg by mouth 3 (three) times a day, Starting Wed 7/3/2019, Historical Med      ziprasidone (GEODON) 40 mg capsule Take 40 mg by mouth 2 (two) times a day, Starting Wed 7/3/2019, Historical Med      sildenafil (VIAGRA) 100 mg tablet Take 100 mg by mouth daily as needed for erectile dysfunction, Historical Med           No discharge procedures on file      ED Provider  Electronically Signed by           Chris Alvarado DO  11/07/19 7292

## 2019-11-06 NOTE — PLAN OF CARE
Problem: PAIN - ADULT  Goal: Verbalizes/displays adequate comfort level or baseline comfort level  Description  Interventions:  - Encourage patient to monitor pain and request assistance  - Assess pain using appropriate pain scale  - Administer analgesics based on type and severity of pain and evaluate response  - Implement non-pharmacological measures as appropriate and evaluate response  - Consider cultural and social influences on pain and pain management  - Notify physician/advanced practitioner if interventions unsuccessful or patient reports new pain  Outcome: Progressing     Problem: CARDIOVASCULAR - ADULT  Goal: Maintains optimal cardiac output and hemodynamic stability  Description  INTERVENTIONS:  - Monitor I/O, vital signs and rhythm  - Monitor for S/S and trends of decreased cardiac output  - Administer and titrate ordered vasoactive medications to optimize hemodynamic stability  - Assess quality of pulses, skin color and temperature  - Assess for signs of decreased coronary artery perfusion  - Instruct patient to report change in severity of symptoms  Outcome: Progressing  Goal: Absence of cardiac dysrhythmias or at baseline rhythm  Description  INTERVENTIONS:  - Continuous cardiac monitoring, vital signs, obtain 12 lead EKG if ordered  - Administer antiarrhythmic and heart rate control medications as ordered  - Monitor electrolytes and administer replacement therapy as ordered  Outcome: Progressing     Problem: RESPIRATORY - ADULT  Goal: Achieves optimal ventilation and oxygenation  Description  INTERVENTIONS:  - Assess for changes in respiratory status  - Assess for changes in mentation and behavior  - Position to facilitate oxygenation and minimize respiratory effort  - Oxygen administered by appropriate delivery if ordered  - Initiate smoking cessation education as indicated  - Encourage broncho-pulmonary hygiene including cough, deep breathe, Incentive Spirometry  - Assess the need for suctioning and aspirate as needed  - Assess and instruct to report SOB or any respiratory difficulty  - Respiratory Therapy support as indicated  Outcome: Progressing

## 2019-11-06 NOTE — RESPIRATORY THERAPY NOTE
RT Protocol Note  Karen Guerrero 60 y o  male MRN: [id number]  Unit/Bed#: [facility] 505-01 Encounter: [id number]    Assessment    Principal Problem:    NSTEMI (non-ST elevated myocardial infarction) ([place] [address] )  Active Problems:    CAD (coronary artery disease)    Bipolar 1 disorder ([place] [address] )    Tobacco user    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[place] [address] )      Home Pulmonary Medications:  none       Past Medical History:   Diagnosis Date    Bipolar 1 disorder ([place] [address] )     CAD (coronary artery disease)     Schizo-affective psychosis ([place] [address] )     Tobacco user      Social History     Socioeconomic History    Marital status: Single     Spouse name: Not on file    Number of children: Not on file    Years of education: Not on file    Highest education level: Not on file   Occupational History    Occupation: none     Employer: none   Social Needs    Financial resource strain: Not on file    Food insecurity:     Worry: Not on file     Inability: Not on file    Transportation needs:     Medical: Not on file     Non-medical: Not on file   Tobacco Use    Smoking status: Heavy Tobacco Smoker     Packs/day: 1 50     Types: Cigarettes    Smokeless tobacco: Never Used   Substance and Sexual Activity    Alcohol use: Not Currently    Drug use: Never    Sexual activity: Not on file   Lifestyle    Physical activity:     Days per week: Not on file     Minutes per session: Not on file    Stress: Not on file   Relationships    Social connections:     Talks on phone: Not on file     Gets together: Not on file     Attends Jew service: Not on file     Active member of club or organization: Not on file     Attends meetings of clubs or organizations: Not on file     Relationship status: Not on file    Intimate partner violence:     Fear of current or ex partner: Not on file     Emotionally abused: Not on file     Physically abused: Not on file     Forced sexual activity: Not on file   Other Topics Concern    Not on file   Social History Narrative    Not on file       Subjective         Objective    Physical Exam:   Assessment Type: Assess only  General Appearance: Awake  Respiratory Pattern: Normal  Chest Assessment: Chest expansion symmetrical  Bilateral Breath Sounds: Diminished    Vitals:  Blood pressure 118/58, pulse 59, temperature 98 4 °F (36 9 °C), temperature source Oral, resp  rate 17, height 5' 10" (1 778 m), weight 104 kg (229 lb 3 2 oz), SpO2 96 %  Imaging and other studies: I have personally reviewed pertinent reports  Plan    Respiratory Plan: No distress/Pulmonary history        Resp Comments: (P) Pt admitted for NSTEMI  Pt  c/o of chest pain intermittently throughout the day as well as sob  Pt w  hx of MI and tobacco use  Pt denies sob at this time and denies any pulm meds at home  BS diminished  Will remain pt on prn udn for sob and cont to follow per protocol

## 2019-11-06 NOTE — ASSESSMENT & PLAN NOTE
Resolved    Results from last 7 days   Lab Units 11/06/19  0606 11/05/19  1943   POTASSIUM mmol/L 3 9 3 3*

## 2019-11-07 ENCOUNTER — APPOINTMENT (INPATIENT)
Dept: NON INVASIVE DIAGNOSTICS | Facility: HOSPITAL | Age: 64
DRG: 281 | End: 2019-11-07
Attending: INTERNAL MEDICINE
Payer: MEDICARE

## 2019-11-07 ENCOUNTER — TRANSITIONAL CARE MANAGEMENT (OUTPATIENT)
Dept: FAMILY MEDICINE CLINIC | Facility: CLINIC | Age: 64
End: 2019-11-07

## 2019-11-07 ENCOUNTER — APPOINTMENT (INPATIENT)
Dept: NON INVASIVE DIAGNOSTICS | Facility: HOSPITAL | Age: 64
DRG: 281 | End: 2019-11-07
Payer: MEDICARE

## 2019-11-07 PROBLEM — I21.A1 TYPE 2 MYOCARDIAL INFARCTION WITHOUT ST ELEVATION (HCC): Status: ACTIVE | Noted: 2019-11-05

## 2019-11-07 LAB
ANION GAP SERPL CALCULATED.3IONS-SCNC: 4 MMOL/L (ref 4–13)
APTT PPP: 29 SECONDS (ref 23–37)
ATRIAL RATE: 49 BPM
BUN SERPL-MCNC: 10 MG/DL (ref 5–25)
CALCIUM SERPL-MCNC: 9.4 MG/DL (ref 8.3–10.1)
CHLORIDE SERPL-SCNC: 107 MMOL/L (ref 100–108)
CO2 SERPL-SCNC: 27 MMOL/L (ref 21–32)
CREAT SERPL-MCNC: 1 MG/DL (ref 0.6–1.3)
ERYTHROCYTE [DISTWIDTH] IN BLOOD BY AUTOMATED COUNT: 14.2 % (ref 11.6–15.1)
GFR SERPL CREATININE-BSD FRML MDRD: 80 ML/MIN/1.73SQ M
GLUCOSE SERPL-MCNC: 85 MG/DL (ref 65–140)
HCT VFR BLD AUTO: 53.2 % (ref 36.5–49.3)
HGB BLD-MCNC: 16.9 G/DL (ref 12–17)
INR PPP: 1.07 (ref 0.84–1.19)
MCH RBC QN AUTO: 32.6 PG (ref 26.8–34.3)
MCHC RBC AUTO-ENTMCNC: 31.8 G/DL (ref 31.4–37.4)
MCV RBC AUTO: 103 FL (ref 82–98)
MRSA NOSE QL CULT: NORMAL
P AXIS: 65 DEGREES
PLATELET # BLD AUTO: 224 THOUSANDS/UL (ref 149–390)
PMV BLD AUTO: 10.3 FL (ref 8.9–12.7)
POTASSIUM SERPL-SCNC: 4.7 MMOL/L (ref 3.5–5.3)
PR INTERVAL: 162 MS
PROTHROMBIN TIME: 13.5 SECONDS (ref 11.6–14.5)
QRS AXIS: -19 DEGREES
QRSD INTERVAL: 70 MS
QT INTERVAL: 480 MS
QTC INTERVAL: 433 MS
RBC # BLD AUTO: 5.19 MILLION/UL (ref 3.88–5.62)
SODIUM SERPL-SCNC: 138 MMOL/L (ref 136–145)
T WAVE AXIS: -2 DEGREES
VENTRICULAR RATE: 49 BPM
WBC # BLD AUTO: 6.43 THOUSAND/UL (ref 4.31–10.16)

## 2019-11-07 PROCEDURE — 11721 DEBRIDE NAIL 6 OR MORE: CPT | Performed by: PODIATRIST

## 2019-11-07 PROCEDURE — G8988 SELF CARE GOAL STATUS: HCPCS

## 2019-11-07 PROCEDURE — C1769 GUIDE WIRE: HCPCS | Performed by: INTERNAL MEDICINE

## 2019-11-07 PROCEDURE — 85730 THROMBOPLASTIN TIME PARTIAL: CPT | Performed by: INTERNAL MEDICINE

## 2019-11-07 PROCEDURE — 0HBRXZZ EXCISION OF TOE NAIL, EXTERNAL APPROACH: ICD-10-PCS | Performed by: PODIATRIST

## 2019-11-07 PROCEDURE — C1894 INTRO/SHEATH, NON-LASER: HCPCS | Performed by: INTERNAL MEDICINE

## 2019-11-07 PROCEDURE — 93454 CORONARY ARTERY ANGIO S&I: CPT | Performed by: INTERNAL MEDICINE

## 2019-11-07 PROCEDURE — ND001 PR NO DOCUMENTATION: Performed by: NURSE PRACTITIONER

## 2019-11-07 PROCEDURE — 99153 MOD SED SAME PHYS/QHP EA: CPT | Performed by: INTERNAL MEDICINE

## 2019-11-07 PROCEDURE — 80048 BASIC METABOLIC PNL TOTAL CA: CPT | Performed by: INTERNAL MEDICINE

## 2019-11-07 PROCEDURE — 99152 MOD SED SAME PHYS/QHP 5/>YRS: CPT | Performed by: INTERNAL MEDICINE

## 2019-11-07 PROCEDURE — 99222 1ST HOSP IP/OBS MODERATE 55: CPT | Performed by: SURGERY

## 2019-11-07 PROCEDURE — 93926 LOWER EXTREMITY STUDY: CPT

## 2019-11-07 PROCEDURE — B2011ZZ PLAIN RADIOGRAPHY OF MULTIPLE CORONARY ARTERIES USING LOW OSMOLAR CONTRAST: ICD-10-PCS | Performed by: INTERNAL MEDICINE

## 2019-11-07 PROCEDURE — 85027 COMPLETE CBC AUTOMATED: CPT | Performed by: INTERNAL MEDICINE

## 2019-11-07 PROCEDURE — 93010 ELECTROCARDIOGRAM REPORT: CPT | Performed by: INTERNAL MEDICINE

## 2019-11-07 PROCEDURE — 99222 1ST HOSP IP/OBS MODERATE 55: CPT | Performed by: PODIATRIST

## 2019-11-07 PROCEDURE — 93005 ELECTROCARDIOGRAM TRACING: CPT

## 2019-11-07 PROCEDURE — 99232 SBSQ HOSP IP/OBS MODERATE 35: CPT | Performed by: PHYSICIAN ASSISTANT

## 2019-11-07 PROCEDURE — G8987 SELF CARE CURRENT STATUS: HCPCS

## 2019-11-07 PROCEDURE — 97165 OT EVAL LOW COMPLEX 30 MIN: CPT

## 2019-11-07 PROCEDURE — 99232 SBSQ HOSP IP/OBS MODERATE 35: CPT | Performed by: INTERNAL MEDICINE

## 2019-11-07 PROCEDURE — 85610 PROTHROMBIN TIME: CPT | Performed by: INTERNAL MEDICINE

## 2019-11-07 RX ORDER — ISOSORBIDE MONONITRATE 30 MG/1
30 TABLET, EXTENDED RELEASE ORAL DAILY
Status: DISCONTINUED | OUTPATIENT
Start: 2019-11-07 | End: 2019-11-08 | Stop reason: HOSPADM

## 2019-11-07 RX ORDER — VERAPAMIL HCL 2.5 MG/ML
AMPUL (ML) INTRAVENOUS CODE/TRAUMA/SEDATION MEDICATION
Status: COMPLETED | OUTPATIENT
Start: 2019-11-07 | End: 2019-11-07

## 2019-11-07 RX ORDER — SODIUM CHLORIDE 9 MG/ML
100 INJECTION, SOLUTION INTRAVENOUS CONTINUOUS
Status: DISCONTINUED | OUTPATIENT
Start: 2019-11-07 | End: 2019-11-07

## 2019-11-07 RX ORDER — FENTANYL CITRATE 50 UG/ML
INJECTION, SOLUTION INTRAMUSCULAR; INTRAVENOUS CODE/TRAUMA/SEDATION MEDICATION
Status: COMPLETED | OUTPATIENT
Start: 2019-11-07 | End: 2019-11-07

## 2019-11-07 RX ORDER — HEPARIN SODIUM 1000 [USP'U]/ML
INJECTION, SOLUTION INTRAVENOUS; SUBCUTANEOUS CODE/TRAUMA/SEDATION MEDICATION
Status: COMPLETED | OUTPATIENT
Start: 2019-11-07 | End: 2019-11-07

## 2019-11-07 RX ORDER — MIDAZOLAM HYDROCHLORIDE 2 MG/2ML
INJECTION, SOLUTION INTRAMUSCULAR; INTRAVENOUS CODE/TRAUMA/SEDATION MEDICATION
Status: COMPLETED | OUTPATIENT
Start: 2019-11-07 | End: 2019-11-07

## 2019-11-07 RX ORDER — LIDOCAINE HYDROCHLORIDE 10 MG/ML
INJECTION, SOLUTION EPIDURAL; INFILTRATION; INTRACAUDAL; PERINEURAL CODE/TRAUMA/SEDATION MEDICATION
Status: COMPLETED | OUTPATIENT
Start: 2019-11-07 | End: 2019-11-07

## 2019-11-07 RX ORDER — SODIUM CHLORIDE 9 MG/ML
75 INJECTION, SOLUTION INTRAVENOUS CONTINUOUS
Status: DISPENSED | OUTPATIENT
Start: 2019-11-07 | End: 2019-11-07

## 2019-11-07 RX ORDER — NITROGLYCERIN 20 MG/100ML
INJECTION INTRAVENOUS CODE/TRAUMA/SEDATION MEDICATION
Status: COMPLETED | OUTPATIENT
Start: 2019-11-07 | End: 2019-11-07

## 2019-11-07 RX ADMIN — TOPIRAMATE 100 MG: 100 TABLET, FILM COATED ORAL at 08:55

## 2019-11-07 RX ADMIN — ATORVASTATIN CALCIUM 80 MG: 80 TABLET, FILM COATED ORAL at 17:04

## 2019-11-07 RX ADMIN — HEPARIN SODIUM 4000 UNITS: 1000 INJECTION INTRAVENOUS; SUBCUTANEOUS at 12:22

## 2019-11-07 RX ADMIN — TOPIRAMATE 100 MG: 100 TABLET, FILM COATED ORAL at 17:04

## 2019-11-07 RX ADMIN — TOPIRAMATE 100 MG: 100 TABLET, FILM COATED ORAL at 21:04

## 2019-11-07 RX ADMIN — CLOPIDOGREL BISULFATE 75 MG: 75 TABLET ORAL at 08:55

## 2019-11-07 RX ADMIN — MIDAZOLAM 2 MG: 1 INJECTION INTRAMUSCULAR; INTRAVENOUS at 12:10

## 2019-11-07 RX ADMIN — NITROGLYCERIN 200 MCG: 20 INJECTION INTRAVENOUS at 12:22

## 2019-11-07 RX ADMIN — ZIPRASIDONE HCL 40 MG: 40 CAPSULE ORAL at 08:55

## 2019-11-07 RX ADMIN — METOPROLOL TARTRATE 25 MG: 25 TABLET ORAL at 08:56

## 2019-11-07 RX ADMIN — ISOSORBIDE MONONITRATE 30 MG: 30 TABLET, EXTENDED RELEASE ORAL at 14:54

## 2019-11-07 RX ADMIN — ASPIRIN 81 MG 81 MG: 81 TABLET ORAL at 08:55

## 2019-11-07 RX ADMIN — SODIUM CHLORIDE 75 ML/HR: 0.9 INJECTION, SOLUTION INTRAVENOUS at 13:00

## 2019-11-07 RX ADMIN — ZIPRASIDONE HCL 40 MG: 40 CAPSULE ORAL at 17:04

## 2019-11-07 RX ADMIN — SODIUM CHLORIDE 100 ML/HR: 0.9 INJECTION, SOLUTION INTRAVENOUS at 09:24

## 2019-11-07 RX ADMIN — LIDOCAINE HYDROCHLORIDE 0.1 ML: 10 INJECTION, SOLUTION EPIDURAL; INFILTRATION; INTRACAUDAL; PERINEURAL at 12:16

## 2019-11-07 RX ADMIN — FENTANYL CITRATE 50 MCG: 50 INJECTION, SOLUTION INTRAMUSCULAR; INTRAVENOUS at 12:09

## 2019-11-07 RX ADMIN — IOHEXOL 50 ML: 350 INJECTION, SOLUTION INTRAVENOUS at 12:30

## 2019-11-07 RX ADMIN — VERAPAMIL HYDROCHLORIDE 1.25 MG: 2.5 INJECTION INTRAVENOUS at 12:22

## 2019-11-07 NOTE — OCCUPATIONAL THERAPY NOTE
[address] Evaluation     Patient Name: Whitley IRBYTC'O Date: 11/7/2019  Problem List  Principal Problem:    NSTEMI (non-ST elevated myocardial infarction) [facility])  Active Problems:    CAD (coronary artery disease)    Bipolar 1 disorder (HCC)    Tobacco user    Embolism and thrombosis of iliac artery [facility])    Past Medical History  Past Medical History:   Diagnosis Date    [facility]     CAD (coronary artery disease)     [facility]     Tobacco user      Past Surgical History  Past Surgical History:   Procedure Laterality Date    BACK SURGERY      CARDIAC SURGERY      stent placement    CHOLECYSTECTOMY      KNEE SURGERY Right         Time: 1381-1190 (eval only)       11/07/19 0830   Note Type   Note type Eval only   Restrictions/Precautions   Weight Bearing Precautions Per Order No   Braces or Orthoses   (none)   Pain Assessment   Pain Assessment No/denies pain   Pain Score No Pain   Home Living   [facility];Performs ADLs on one level; Able to live on main level with bedroom/bathroom  (0 ISH)   Bathroom Shower/Tub Walk-in shower   Bathroom Toilet Standard   Bathroom Equipment Grab bars in shower; Shower chair   [address]   (none)   Prior Function   Level of [place] Independent with ADLs and functional mobility   Lives With Alone   Receives Help From   (pt reports limited support)   ADL Assistance Independent   IADLs Independent   Falls in the last 6 months 0   Vocational   (not working)   Comments (+)    Psychosocial   Psychosocial (WDL) WDL   Subjective   Subjective I want something to eat   ADL   Where Assessed Edge of bed   Eating Assistance 7  Independent   Grooming Assistance 7  Independent   UB Bathing Assistance [id number] Assistance  6  Modified independent   Bed Mobility   Rolling R 7  Independent   Rolling L 7  Independent   Supine to Sit 7  Independent   Sit to Supine 7  Independent   Additional Comments arrived to pt supine in bed and returned to bed per pt request at end of session   Transfers   Sit to Stand 6  Modified independent   Stand to Sit 6  Modified independent   Stand pivot 6  Modified independent   Toilet transfer 6  Modified independent   Additional Comments no AD   Functional Mobility   Functional Mobility 6  Modified independent   Additional items   (none)   Balance   Static Sitting Good   Dynamic Sitting Fair +   Static Standing Fair +   Dynamic Standing Fair +   Ambulatory Fair +   Activity Tolerance   Activity Tolerance Patient tolerated treatment well   Medical Staff Made Aware yes   Nurse Made Aware IRAM Flores   RUE Assessment   RUE Assessment WFL  (mmt 5/5 t/o all planes)   LUE Assessment   LUE Assessment WFL  (mmt 5/5 t/o all planes)   Hand Function   Gross Motor Coordination Functional   Fine Motor Coordination Functional   Sensation   Light Touch No apparent deficits   Sharp/Dull No apparent deficits   Vision-Basic Assessment   Current Vision Wears glasses only for reading   Patient Visual Report   (no acute visual changes reported)   Cognition   Overall Cognitive Status WFL   Arousal/Participation Alert; Responsive; Cooperative   Attention Within functional limits   Orientation Level Oriented X4   Memory Within functional limits   Following Commands Follows all commands and directions without difficulty   Comments cooperative t/o eval   Assessment   Limitation   (health management)   Prognosis Good   Assessment Pt is a 60 y o  male seen for OT evaluation s/p admit to [facility] on 11/6/2019 w/ NSTEMI (non-ST elevated [facility] infarction) [facility] )  Comorbidities affecting pt's functional performance at time of assessment include: CAD, bipolar 1 disorder, tobacco user and embolism and thrombosis of iliac artery  Personal factors affecting pt at time of IE include:flat affect and health management   Prior to admission, pt was indep with ADLs, IADLs and functional transfers/mobility w/o use of DME  Has limited friend/family support  Lives alone in a [facility] with 0 ISH  Upon evaluation: Patient appears to be functioning at baseline, completing bed mobility, transfers, mobility and ADLS with MI/I level  Recommend pt d/c to home w/ increased family support when medically stable  Pt will no longer benefit from immediate acute skilled OT services  Plan for cardiac cath today  Pt w/ no further questions or concerns regarding OT services  Will D/C OT at this time  Please re-consult if pt's medical status changes  Based on findings, patient is of low complexity     Goals   Patient Goals to eat   Recommendation   OT Discharge Recommendation Home independent   Barthel Index   Feeding 10   Bathing 5   Grooming Score 5   Dressing Score 10   Bladder Score 10   Bowels Score 10   Toilet Use Score 10   Transfers (Bed/Chair) Score 15   Mobility (Level Surface) Score 15   Stairs Score 0   Barthel Index Score 90           Eugene Calderon MS, OTR/L

## 2019-11-07 NOTE — PHYSICAL THERAPY NOTE
Physical Therapy Cancellation Note    PT CONSULT RECEIVED  CHART REVIEWED  APPROPRIATE TO SEE PER RN  PATIENT REFUSING AT THIS TIME SECONDARY TO PENDING PROCEDURE (CARDIAC CATH) AND REPORTED FATIGUE  PT WILL CONTINUE TO FOLLOW ON CASELOAD AND PERFORM INITIAL EVALUATION AS MEDICALLY APPROPRIATE       Neris Ronquillo, PT

## 2019-11-07 NOTE — ASSESSMENT & PLAN NOTE
· Coronary artery disease status post recent LAD stent  · S/p repeat cardiac catheterization today  In the LAD there was a nonobstructive plaque with a widely patent stent  · Continue Lopressor, Plavix, ASA and atorvastatin  · Cardiology has added Imdur

## 2019-11-07 NOTE — CONSULTS
Podiatry Consultation - Nora Dennis     Patient Information: Dora Guerrero 60 y o  male MRN: [id number]  Unit/Bed#: [facility] 505-01 Encounter: [id number]  PCP: Estiven Anaya  Date of Admission:  11/6/2019  Date of Consultation: 11/07/19  Requesting Physician: Linwood Marie DO    Reason For Consultation:   Dystrophic nails    Assessment:     Podiatric assessment:   Onychomycosis    Principal Problem:    NSTEMI (non-ST elevated myocardial infarction) [facility])  Active Problems:    CAD (coronary artery disease)    Bipolar 1 disorder ([email or web address] )    Tobacco user    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[place] [address] )      Plan:     Toenails x10 were excisionally debrided using a large nipper to normal length and thickness without incident   Weight bearing as tolerated   Continue rest of care per primary team    Podiatry signing off, thank you for the consult! Please contact with any questions or concerns  History of Present Illness:    Loki Bee is a 60 y o  male who presents with painful, elongated toenails x 10  They state that they have difficulty applying their socks and shoes due to the elongation of the nails  They report pain with palpation and pressure within their shoe gear  They have been unable to cut their nails adequately  Patient has no further pedal complaints and denies N/V/F/chills/CP/SOB  Review of Systems:    Review of Systems   Constitutional: Negative  HENT: Negative  Eyes: Negative  Respiratory: Negative  Cardiovascular: Negative  Gastrointestinal: Negative  Musculoskeletal: negative  Skin: Thickened, elongated toenails  Neurological: negative  Psych: Negative       Past Medical and Surgical History:     Past Medical History:   Diagnosis Date    Bipolar 1 disorder ([email or web address] )     CAD (coronary artery disease)     Schizo-affective psychosis ([email or web address] )     Tobacco user        Past Surgical History:   Procedure Laterality Date    BACK SURGERY      CARDIAC SURGERY      stent placement    CHOLECYSTECTOMY      KNEE SURGERY Right        Meds/Allergies:    Meds:  PTA meds:   Prior to Admission Medications   Prescriptions Last Dose Informant Patient Reported? Taking?   aspirin 81 mg chewable tablet 11/6/2019 at Unknown time Self No Yes   Sig: Chew 1 tablet (81 mg total) daily   atorvastatin (LIPITOR) 80 mg tablet 11/6/2019 at Unknown time  No Yes   Sig: Take 1 tablet (80 mg total) by mouth every evening   clopidogrel (PLAVIX) 75 mg tablet 11/6/2019 at Unknown time Self No Yes   Sig: Take 1 tablet (75 mg total) by mouth daily   metoprolol tartrate (LOPRESSOR) 25 mg tablet 11/6/2019 at Unknown time  No Yes   Sig: Take 0 5 tablets (12 5 mg total) by mouth every 12 (twelve) hours   Patient taking differently: Take 25 mg by mouth every 12 (twelve) hours    nitroglycerin (NITROSTAT) 0 4 mg SL tablet 11/6/2019 at Unknown time Self No Yes   Sig: Place 1 tablet (0 4 mg total) under the tongue every 5 (five) minutes as needed for chest pain   sildenafil (VIAGRA) 100 mg tablet More than a month at Unknown time Self Yes No   Sig: Take 100 mg by mouth daily as needed for erectile dysfunction   topiramate (TOPAMAX) 100 mg tablet 11/6/2019 at Unknown time Self Yes Yes   Sig: Take 100 mg by mouth 3 (three) times a day   ziprasidone (GEODON) 40 mg capsule 11/6/2019 at Unknown time Self Yes Yes   Sig: Take 40 mg by mouth 2 (two) times a day      Facility-Administered Medications: None       Allergies:    Allergies   Allergen Reactions    Fish-Derived Products Hives    Rice Bean [Phaseolus] Hives       Social History:     Marital Status:   Single    Substance Use History:   Social History     Substance and Sexual Activity   Alcohol Use Not Currently    Frequency: Never     Social History     Tobacco Use   Smoking Status Heavy Tobacco Smoker    Packs/day: 1 50    Types: Cigarettes   Smokeless Tobacco Never Used     Social History     Substance and Sexual Activity   Drug Use Never       Family History:    Family History   Problem Relation Age of Onset    Heart disease Mother     Heart disease Father     Multiple sclerosis Sister        Physical Exam:     Vitals:   Blood Pressure: 119/63 (11/07/19 0711)  Pulse: 56 (11/07/19 0711)  Temperature: 98 °F (36 7 °C) (11/07/19 0711)  Temp Source: Oral (11/07/19 0711)  Respirations: 20 (11/07/19 0711)  Height: 5' 10" (177 8 cm) (11/06/19 1808)  Weight - Scale: 104 kg (229 lb 3 2 oz) (11/06/19 1808)  SpO2: 97 % (11/07/19 0711)        Physical Exam  General Appearance:    Alert, cooperative, no distress   Head:    Normocephalic, without obvious abnormality, atraumatic   Eyes:    PERRL, conjunctiva/corneas clear      Nose:   Moist mucous membranes   Neck:   Supple, symmetrical, trachea midline   Back:     Symmetric   Lungs:     Respirations unlabored   Heart:    Regular rate and rhythm   Abdomen:     Soft, non-tender    Foot Exam     Vascular:   DP pulses and PT pulses are present bilaterally  CRT < 3 seconds at the digits bilaterally  +0/4 edema noted noted bilaterally  Pedal hair is present  Skin temperature is WNL bilaterally  Musculoskeletal:   MMT is 5/5 to all compartments of the LE, No pain on palpation noted bilaterally  ROM at the ankle joint is WNL nonpainful bilaterally  no gross deformities noted  Dermatological:   Skin is of Normal appearance  Skin is of Normal  turgor  Skin is of Normal temperature  Elongated, discolored, dystrophic nails x 10 that are positive for subungual debris  Mild pain on palpation of the nail bed  Neurologic:   Gross sensation is Intact  Protective sensation is Intact  Lab Results: I have personally reviewed pertinent reports      Results from last 7 days   Lab Units 11/07/19 0613 11/05/19 1943   WBC Thousand/uL 6 43   < > 15 30*   HEMOGLOBIN g/dL 16 9   < > 16 0   HEMATOCRIT % 53 2*   < > 47 6   PLATELETS Thousands/uL 224   < > 247   NEUTROS PCT %  --   --  83*   LYMPHS PCT %  --   --  12*   MONOS PCT %  -- --  5   EOS PCT %  --   --  0    < > = values in this interval not displayed  Results from last 7 days   Lab Units 11/07/19  0613 11/06/19  0606   POTASSIUM mmol/L 4 7 3 9   CHLORIDE mmol/L 107 103   CO2 mmol/L 27 26   BUN mg/dL 10 5   CREATININE mg/dL 1 00 0 85   CALCIUM mg/dL 9 4 8 7   ALK PHOS U/L  --  69   ALT U/L  --  17   AST U/L  --  25     Results from last 7 days   Lab Units 11/07/19  0613   INR  1 07       Imaging: I have personally reviewed pertinent films in PACS        ** Please Note: This note has been constructed using a voice recognition system   **

## 2019-11-07 NOTE — ASSESSMENT & PLAN NOTE
· Incidental finding on on CTA dissection protocol, he was found to have 6 mm thrombus of left common iliac artery    · Vascular surgery has evaluated the patient  They do feel this lesion is associated with atherosclerosis rather than an embolic etiology    · Obtain LEADs: pending  · Continue ASA, Plavix and statin

## 2019-11-07 NOTE — SOCIAL WORK
CM met with pt to discuss DCP and cm role  Pt lives with girlfriend Collin Denise in a 5th floor apt with elevator access  Prior to admission pt was independent with ambulation and with ADLS  No prior hx of VNA  Pt's preference for pharmacy is [facility] in [address]  Pt states that he is able to afford his medications and has been taking his medications as ordered  Pt hs a dx of bipolar disorder denies any inpt psych stays or drug/ETOH stays  Patient/caregiver received discharge checklist  Content reviewed  Patient/caregiver encouraged to participate in discharge plan of care prior to discharge home  CM reviewed d/c planning process including the following: identifying help at home, patient preference for d/c planning needs, Discharge Lounge, [facility] Meds to Bed program, availability of treatment team to discuss questions or concerns patient and/or family may have regarding understanding medications and recognizing signs and symptoms once discharged  CM also encouraged patient to follow up with all recommended appointments after discharge  Patient advised of importance for patient and family to participate in managing patients medical well being

## 2019-11-07 NOTE — PROGRESS NOTES
General Cardiology   Progress Note -  Team One   Sara Guerrero 60 y o  male MRN: [id number]    Unit/Bed#: [facility] 505-01 Encounter: [id number]    Late documentation; patient seen at 9:15am on 11/7/19    Assessment/ Plan:    1  NSTEMI: type 1 vs Type 2 pt with peak troponin 6  Pain relieved with nitroglycerin  Treated with therapeutic SQ lovenox  Continue ASA, plavix, statin and BB  For cardiac catheterization today  Further plan pending results of cath  Check limited echo; EF preserved at time of MI 8/2019    2  Hx of CAD with prior STEMI 8/2019 with LAD stenting:  Pt reports full compliance with DAPT with ASA and Plavix as well as BB and statin  3  Bipolar/schizoaffective disorder: continue home medications  4  Active tobacco abuse: 1 5 PPD; advise cessation  5  PAD: 6mm possible thrombus seen in left common iliac artery; vascular surgery involved  Subjective: pt seen for follow up; he was seen by cardiology at [facility] where he presented with chest pain and elevated troponin peak of 6; no ST elevations  He was treated with therapeutic Lovenox; ASA/Plavix  He was transferred to [address] [place] [address] for cardiac catheterization  At time of my exam pt reports being chest pain free overnight and currently  He denies any complaints  Tells me he is compliant with her medications and is able to tell me he has not missed any dose of his ASA and Plavix  Agreeable for catheterization today  Review of Systems   Constitution: Negative for decreased appetite and fever  Cardiovascular: Negative for chest pain, dyspnea on exertion, leg swelling, orthopnea, palpitations and syncope  Respiratory: Negative for cough, shortness of breath and wheezing  Gastrointestinal: Negative for abdominal pain, nausea and vomiting  Genitourinary: Negative for dysuria  Neurological: Negative for dizziness and light-headedness  Psychiatric/Behavioral: Negative for altered mental status       Objective: Vitals: Blood pressure 102/60, pulse 56, temperature 98 2 °F (36 8 °C), temperature source Oral, resp  rate 18, height 5' 10" (1 778 m), weight 104 kg (229 lb 3 2 oz), SpO2 98 %  ,     Body mass index is 32 89 kg/m²  ,     Systolic (94GAN), NX , Min:102 , STH:494     Diastolic (03PXT), NPV:16, Min:44, Max:72      Intake/Output Summary (Last 24 hours) at 2019 1351  Last data filed at 2019 0811  Gross per 24 hour   Intake 0 ml   Output 900 ml   Net -900 ml     Weight (last 2 days)     Date/Time   Weight    19 1808   104 (229 2)            Telemetry Review:   Sinus rhythm, no significant events    Physical Exam   Constitutional: He is oriented to person, place, and time  No distress  Pt sitting up in bed in NAD; alert and cooperative   HENT:   Head: Normocephalic and atraumatic  Neck: No JVD present  Cardiovascular: Normal rate, regular rhythm and S2 normal    No murmur heard  Trace ankle edema   Pulmonary/Chest: Effort normal and breath sounds normal  He has no wheezes  He has no rales  LS CTA, no RA   Abdominal: Soft  Musculoskeletal: He exhibits no edema  Neurological: He is alert and oriented to person, place, and time  Skin: Skin is warm and dry  He is not diaphoretic  Psychiatric: He has a normal mood and affect  His behavior is normal    Nursing note and vitals reviewed      LABORATORY RESULTS  Results from last 7 days   Lab Units 19  1021 19  0606 19  0124   TROPONIN I ng/mL 4 38* 6 07* 5 49*     CBC with diff: Results from last 7 days   Lab Units 19  0613 19  0606 19  1943   WBC Thousand/uL 6 43 9 17 15 30*   HEMOGLOBIN g/dL 16 9 15 8 16 0   HEMATOCRIT % 53 2* 47 9 47 6   MCV fL 103* 101* 99*   PLATELETS Thousands/uL 224 234 247   MCH pg 32 6 33 3 33 1   MCHC g/dL 31 8 33 0 33 6   RDW % 14 2 13 8 13 7   MPV fL 10 3 10 1 10 2   NRBC AUTO /100 WBCs  --   --  0       CMP:  Results from last 7 days   Lab Units 19  0613 19  0606 19  1943   POTASSIUM mmol/L 4 7 3 9 3 3*   CHLORIDE mmol/L 107 103 102   CO2 mmol/L 27 26 23   BUN mg/dL 10 5 6   CREATININE mg/dL 1 00 0 85 1 09   CALCIUM mg/dL 9 4 8 7 8 8   AST U/L  --  25 34   ALT U/L  --  17 20   ALK PHOS U/L  --  69 75   EGFR ml/min/1 73sq m 80 93 72       BMP:  Results from last 7 days   Lab Units 19  0613 19  0606 19  1943   POTASSIUM mmol/L 4 7 3 9 3 3*   CHLORIDE mmol/L 107 103 102   CO2 mmol/L 27 26 23   BUN mg/dL 10 5 6   CREATININE mg/dL 1 00 0 85 1 09   CALCIUM mg/dL 9 4 8 7 8 8     Lab Results   Component Value Date    NTBNP 253 (H) 2019     Results from last 7 days   Lab Units 19  1943   MAGNESIUM mg/dL 2 1      Results from last 7 days   Lab Units 19  1943   TSH 3RD GENERATON uIU/mL 1 596     Results from last 7 days   Lab Units 19  0613   INR  1 07     Lipid Profile:   No results found for: CHOL  Lab Results   Component Value Date    HDL 41 2019    HDL 42 2019     Lab Results   Component Value Date    LDLCALC 90 2019    [address] 85 2019     Lab Results   Component Value Date    TRIG 71 2019    TRIG 75 2019       Cardiac testing:   Results for orders placed during the hospital encounter of 19   Echo complete with contrast if indicated    Narrative [address]  [phone]    Transthoracic Echocardiogram  2D, M-mode, Doppler, and Color Doppler    Study date:  14-Aug-2019    Patient: Santa Beard  MR number: [id number]  Account number: [de-identified]  : 1955  Age: 66 years  Gender: Male  Status: Inpatient  Location: Bedside  Height: 70 in  Weight: 232 5 lb  BP: 121/ 74 mmHg    Indications: Myocardial infarction      Diagnoses: I25 119 - Atherosclerotic heart disease of native coronary artery with unspecified angina pectoris    Sonographer:  Chetan Rodriguez RDCS  Referring Physician:  Jameson Sofia MD  Group:    [facility]  Cardiology Fellow:  Shawn Islas MD  Interpreting Physician:  Shayna Fernando MD    SUMMARY    LEFT VENTRICLE:  Systolic function was normal  Ejection fraction was estimated to be 55 %  There was possible hypokinesis of the apical wall(s)  AORTIC VALVE:  There was mild regurgitation  HISTORY: PRIOR HISTORY: Heavy smoker, bipolar disorder, schizo-affective psychosis  PROCEDURE: The procedure was performed at the bedside  This was a routine study  The transthoracic approach was used  The study included complete 2D imaging, M-mode, complete spectral Doppler, and color Doppler  The heart rate was 48 bpm,  at the start of the study  Images were obtained from the parasternal, apical, subcostal, and suprasternal notch acoustic windows  Image quality was adequate  LEFT VENTRICLE: Size was normal  Systolic function was normal  Ejection fraction was estimated to be 55 %  There was possible hypokinesis of the apical wall(s)  Wall thickness was normal  DOPPLER: The ratio of early ventricular filling to  atrial contraction velocities was within the normal range  RIGHT VENTRICLE: The size was normal  Systolic function was normal     LEFT ATRIUM: The atrium was mildly dilated  RIGHT ATRIUM: Size was normal     MITRAL VALVE: Valve structure was normal  There was normal leaflet separation  DOPPLER: There was no evidence for stenosis  There was trace regurgitation  AORTIC VALVE: The valve was trileaflet  Leaflets exhibited mildly increased thickness and normal cuspal separation  DOPPLER: There was no evidence for stenosis  There was mild regurgitation  TRICUSPID VALVE: The valve structure was normal  There was normal leaflet separation  DOPPLER: There was no evidence for stenosis  There was trace regurgitation  PULMONIC VALVE: Leaflets exhibited normal thickness, no calcification, and normal cuspal separation  DOPPLER: The transpulmonic velocity was within the normal range  There was no significant regurgitation  PERICARDIUM: There was no pericardial effusion  The pericardium was normal in appearance  AORTA: The root exhibited normal size  SYSTEMIC VEINS: IVC: The inferior vena cava was normal in size      SYSTEM MEASUREMENT TABLES    2D  %FS: 26 16 %  Ao Diam: 3 53 cm  EDV(Teich): 190 01 ml  EF(Teich): 50 41 %  ESV(Teich): 94 22 ml  IVSd: 0 79 cm  LA Area: 21 81 cm2  LA Diam: 4 22 cm  LVEDV MOD A4C: 153 33 ml  LVEF MOD A4C: 51 31 %  LVESV MOD A4C: 74 66 ml  LVIDd: 6 14 cm  LVIDs: 4 54 cm  LVLd A4C: 8 84 cm  LVLs A4C: 7 94 cm  LVPWd: 0 83 cm  RA Area: 17 23 cm2  RVIDd: 3 cm  SV MOD A4C: 78 67 ml  SV(Teich): 95 78 ml    MM  TAPSE: 2 2 cm    PW  E': 0 06 m/s  E/E': 13 36  MV A Moises: 0 68 m/s  MV Dec [place]: 3 38 m/s2  MV DecT: 245 06 ms  MV E Moises: 0 83 m/s  MV E/A Ratio: 1 21  MV PHT: 71 07 ms  MVA By PHT: 3 1 cm2    [facility] Accredited Echocardiography Laboratory    Prepared and electronically signed by    Ari Concepcion MD  Signed 15-Aug-2019 08:55:39       Meds/Allergies   current meds:   Current Facility-Administered Medications   Medication Dose Route Frequency    acetaminophen (TYLENOL) tablet 650 mg  650 mg Oral Q6H PRN    aspirin chewable tablet 81 mg  81 mg Oral Daily    atorvastatin (LIPITOR) tablet 80 mg  80 mg Oral QPM    clopidogrel (PLAVIX) tablet 75 mg  75 mg Oral Daily    diphenhydrAMINE (BENADRYL) tablet 25 mg  25 mg Oral Q6H PRN    docusate sodium (COLACE) capsule 100 mg  100 mg Oral BID PRN    isosorbide mononitrate (IMDUR) 24 hr tablet 30 mg  30 mg Oral Daily    levalbuterol (XOPENEX) inhalation solution 1 25 mg  1 25 mg Nebulization Q6H PRN    LORazepam (ATIVAN) tablet 1 mg  1 mg Oral Q8H PRN    magnesium hydroxide (MILK OF MAGNESIA) 400 mg/5 mL oral suspension 30 mL  30 mL Oral BID PRN    metoprolol tartrate (LOPRESSOR) tablet 25 mg  25 mg Oral Q12H PROSPER    morphine injection 2 mg  2 mg Intravenous Q4H PRN    nitroglycerin (NITROSTAT) SL tablet 0 4 mg  0 4 mg Sublingual Q5 Min PRN    ondansetron (ZOFRAN) injection 4 mg  4 mg Intravenous Q4H PRN    oxyCODONE (ROXICODONE) IR tablet 5 mg  5 mg Oral Q4H PRN    sodium chloride 0 9 % infusion  75 mL/hr Intravenous Continuous    topiramate (TOPAMAX) tablet 100 mg  100 mg Oral TID    ziprasidone (GEODON) capsule 40 mg  40 mg Oral BID     Medications Prior to Admission   Medication    aspirin 81 mg chewable tablet    atorvastatin (LIPITOR) 80 mg tablet    clopidogrel (PLAVIX) 75 mg tablet    metoprolol tartrate (LOPRESSOR) 25 mg tablet    nitroglycerin (NITROSTAT) 0 4 mg SL tablet    topiramate (TOPAMAX) 100 mg tablet    ziprasidone (GEODON) 40 mg capsule    sildenafil (VIAGRA) 100 mg tablet         sodium chloride 75 mL/hr Last Rate: 75 mL/hr (11/07/19 1300)       Assessment:  Principal Problem:    NSTEMI (non-ST elevated myocardial infarction) ([facility] [address] )  Active Problems:    CAD (coronary artery disease)    Bipolar 1 disorder (HCC)    Tobacco user    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[facility])        Counseling / Coordination of Care  Total floor / unit time spent today 20 minutes  Greater than 50% of total time was spent with the patient and / or family counseling and / or coordination of care  ** Please Note: Dragon 360 Dictation voice to text software may have been used in the creation of this document   **

## 2019-11-07 NOTE — ASSESSMENT & PLAN NOTE
· Patient with underlying CAD s/p  recent catheterization on 8/13/2019 with CHARBEL x1 to proximal LAD and angioplasty to 90% on 1st diagonal  · Repeat cardiac cath today demonstrated no further CAD  · Ongoing tobacco smoking  Encourage cessation  · Continue aspirin, Plavix, statin and beta-blocker   · Cardiology has added Imdur    · Will obtain a limited ECHO prior to discharge: pending

## 2019-11-07 NOTE — PROGRESS NOTES
Progress Note - Di Rape 1955, 60 y o  male MRN: [id number]  Unit/Bed#: [facility] 505-01 Encounter: [id number]  Primary Care Provider: Estiven Duran   Date and time admitted to hospital: 11/6/2019  5:58 PM    * Type 2 myocardial infarction without ST elevation [facility])  Assessment & Plan  · Patient with underlying CAD s/p  recent catheterization on 8/13/2019 with CHARBEL x1 to proximal LAD and angioplasty to 90% on 1st diagonal  · Repeat cardiac cath today demonstrated no further CAD  · Ongoing tobacco smoking  Encourage cessation  · Continue aspirin, Plavix, statin and beta-blocker   · Cardiology has added Imdur  · Will obtain a limited ECHO prior to discharge: pending    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)  Assessment & Plan  · Incidental finding on on CTA dissection protocol, he was found to have 6 mm thrombus of left common iliac artery    · Vascular surgery has evaluated the patient  They do feel this lesion is associated with atherosclerosis rather than an embolic etiology  · Obtain LEADs: pending  · Continue ASA, Plavix and statin    Tobacco user  Assessment & Plan  · Continue nicotine patch  · Encourage cessation  Bipolar 1 disorder ([facility] [address] )  Assessment & Plan  · With schizoaffective disorder  · Continue Topamax and Geodon    Coronary artery disease involving native coronary artery of native heart with unstable angina pectoris [facility])  Assessment & Plan  · Coronary artery disease status post recent LAD stent  · S/p repeat cardiac catheterization today  In the LAD there was a nonobstructive plaque with a widely patent stent  · Continue Lopressor, Plavix, ASA and atorvastatin  · Cardiology has added Imdur  VTE Pharmacologic Prophylaxis:   Pharmacologic: None currently  Patient just returned from cardiac cath  Mechanical: Mechanical VTE prophylaxis in place  Patient Centered Rounds: I have performed bedside rounds with nursing staff today    Discussions with Specialists or Other Care Team Provider: Cardiology  Education and Discussions with Family / Patient: All patient questions answered to the best of my ability  Time Spent for Care: 20 minutes  More than 50% of total time spent on counseling and coordination of care as described above  Current Length of Stay: 1 day(s)  Current Patient Status: Inpatient   Certification Statement: The patient will continue to require additional inpatient hospital stay due to need for limited ECHO    Discharge Plan: Anticipate discharge tomorrow after limited ECHO completed and cleared by cardiology  S management has been notified that patient will need a ride to [place], [place]  Code Status: Level 1 - Full Code    Subjective:   Patient is doing well overall with no current complaints of chest pain  He denies any shortness of breath, nausea, vomiting, diaphoresis  He has no new complaints otherwise  Objective:   Vitals:   Temp (24hrs), Av °F (36 7 °C), Min:97 5 °F (36 4 °C), Max:98 4 °F (36 9 °C)    Temp:  [97 5 °F (36 4 °C)-98 4 °F (36 9 °C)] 98 °F (36 7 °C)  HR:  [48-61] 54  Resp:  [16-20] 18  BP: (102-133)/(57-72) 112/68  SpO2:  [96 %-98 %] 98 %  Body mass index is 32 89 kg/m²  Input and Output Summary (last 24 hours): Intake/Output Summary (Last 24 hours) at 2019 1623  Last data filed at 2019 1200  Gross per 24 hour   Intake 0 ml   Output 900 ml   Net -900 ml       Physical Exam:     Physical Exam   HENT:   Head: Normocephalic and atraumatic  Mouth/Throat: Oropharynx is clear and moist and mucous membranes are normal    Eyes: No scleral icterus  Cardiovascular: Normal rate and regular rhythm  No murmur heard  Pulmonary/Chest: Breath sounds normal  He has no wheezes  He has no rales  He exhibits no tenderness  Abdominal: Soft  Bowel sounds are normal  He exhibits no distension  There is no tenderness  Musculoskeletal: Normal range of motion  He exhibits no edema  Skin: Skin is warm and dry   No rash noted  Psychiatric: He has a normal mood and affect  Vitals reviewed  Additional Data:   Labs:  Results from last 7 days   Lab Units 11/07/19  0613  11/05/19  1943   WBC Thousand/uL 6 43   < > 15 30*   HEMOGLOBIN g/dL 16 9   < > 16 0   HEMATOCRIT % 53 2*   < > 47 6   PLATELETS Thousands/uL 224   < > 247   NEUTROS PCT %  --   --  83*   LYMPHS PCT %  --   --  12*   MONOS PCT %  --   --  5   EOS PCT %  --   --  0    < > = values in this interval not displayed  Results from last 7 days   Lab Units 11/07/19  0613 11/06/19  0606   POTASSIUM mmol/L 4 7 3 9   CHLORIDE mmol/L 107 103   CO2 mmol/L 27 26   BUN mg/dL 10 5   CREATININE mg/dL 1 00 0 85   CALCIUM mg/dL 9 4 8 7   ALK PHOS U/L  --  69   ALT U/L  --  17   AST U/L  --  25     Results from last 7 days   Lab Units 11/07/19  0613   INR  1 07       * I Have Reviewed All Lab Data Listed Above  * Additional Pertinent Lab Tests Reviewed: All Labs Within Last 24 Hours Reviewed    Imaging:    Imaging Reports Reviewed Today Include:  None new      Cultures:   Blood Culture: No results found for: BLOODCX  Urine Culture: No results found for: URINECX  Sputum Culture: No components found for: SPUTUMCX  Wound Culture: No results found for: WOUNDCULT    Last 24 Hours Medication List:     Current Facility-Administered Medications:  acetaminophen 650 mg Oral Q6H PRN [facility], DO    aspirin 81 mg Oral Daily [facility], DO    atorvastatin 80 mg Oral QPM Arland Nova, DO    clopidogrel 75 mg Oral Daily [facility], DO    diphenhydrAMINE 25 mg Oral Q6H PRN [facility], DO    docusate sodium 100 mg Oral BID PRN [facility], DO    isosorbide mononitrate 30 mg Oral Daily [place], CRNP    levalbuterol 1 25 mg Nebulization Q6H PRN [facility], DO    LORazepam 1 mg Oral Q8H PRN [facility], DO    magnesium hydroxide 30 mL Oral BID PRN [facility], DO    metoprolol tartrate 25 mg Oral Q12H [facility], DO    morphine injection 2 mg Intravenous Q4H PRN Glenys Srivastava,     nitroglycerin 0 4 mg Sublingual Q5 Min PRN Glenys Srivastava, DO    ondansetron 4 mg Intravenous Q4H PRN Glenys Srivastava,     oxyCODONE 5 mg Oral Q4H PRN Glenys Srivastava,     sodium chloride 75 mL/hr Intravenous Continuous Julissa Lundborg, DO Last Rate: 75 mL/hr (11/07/19 1300)   topiramate 100 mg Oral TID Glenys Srivastava DO    ziprasidone 40 mg Oral BID Glenys Srivastava DO         Today, Patient Was Seen By: Nemo Espana PA-C    ** Please Note: Dragon 360 Dictation voice to text software may have been used in the creation of this document   **

## 2019-11-07 NOTE — PLAN OF CARE
Problem: Potential for Falls  Goal: Patient will remain free of falls  Description  INTERVENTIONS:  - Assess patient frequently for physical needs  -  Identify cognitive and physical deficits and behaviors that affect risk of falls    -  [place] fall precautions as indicated by assessment   - Educate patient/family on patient safety including physical limitations  - Instruct patient to call for assistance with activity based on assessment  - Modify environment to reduce risk of injury  - Consider OT/PT consult to assist with strengthening/mobility  Outcome: Progressing     Problem: PAIN - ADULT  Goal: Verbalizes/displays adequate comfort level or baseline comfort level  Description  Interventions:  - Encourage patient to monitor pain and request assistance  - Assess pain using appropriate pain scale  - Administer analgesics based on type and severity of pain and evaluate response  - Implement non-pharmacological measures as appropriate and evaluate response  - Consider cultural and social influences on pain and pain management  - Notify physician/advanced practitioner if interventions unsuccessful or patient reports new pain  Outcome: Progressing     Problem: INFECTION - ADULT  Goal: Absence or prevention of progression during hospitalization  Description  INTERVENTIONS:  - Assess and monitor for signs and symptoms of infection  - Monitor lab/diagnostic results  - Monitor all insertion sites, i e  indwelling lines, tubes, and drains  - Monitor endotracheal if appropriate and nasal secretions for changes in amount and color  - [place] appropriate cooling/warming therapies per order  - Administer medications as ordered  - Instruct and encourage patient and family to use good hand hygiene technique  - Identify and instruct in appropriate isolation precautions for identified infection/condition  Outcome: Progressing  Goal: Absence of fever/infection during neutropenic period  Description  INTERVENTIONS:  - Monitor WBC    Outcome: Progressing     Problem: SAFETY ADULT  Goal: Maintain or return to baseline ADL function  Description  INTERVENTIONS:  -  Assess patient's ability to carry out ADLs; assess patient's baseline for ADL function and identify physical deficits which impact ability to perform ADLs (bathing, care of mouth/teeth, toileting, grooming, dressing, etc )  - Assess/evaluate cause of self-care deficits   - Assess range of motion  - Assess patient's mobility; develop plan if impaired  - Assess patient's need for assistive devices and provide as appropriate  - Encourage maximum independence but intervene and supervise when necessary  - Involve family in performance of ADLs  - Assess for home care needs following discharge   - Consider OT consult to assist with ADL evaluation and planning for discharge  - Provide patient education as appropriate  Outcome: Progressing  Goal: Maintain or return mobility status to optimal level  Description  INTERVENTIONS:  - Assess patient's baseline mobility status (ambulation, transfers, stairs, etc )    - Identify cognitive and physical deficits and behaviors that affect mobility  - Identify mobility aids required to assist with transfers and/or ambulation (gait belt, sit-to-stand, lift, walker, cane, etc )  - [place] fall precautions as indicated by assessment  - Record patient progress and toleration of activity level on Mobility SBAR; progress patient to next Phase/Stage  - Instruct patient to call for assistance with activity based on assessment  - Consider rehabilitation consult to assist with strengthening/weightbearing, etc   Outcome: Progressing     Problem: DISCHARGE PLANNING  Goal: Discharge to home or other facility with appropriate resources  Description  INTERVENTIONS:  - Identify barriers to discharge w/patient and caregiver  - Arrange for needed discharge resources and transportation as appropriate  - Identify discharge learning needs (meds, wound care, etc )  - Arrange for interpretive services to assist at discharge as needed  - Refer to Case Management Department for coordinating discharge planning if the patient needs post-hospital services based on physician/advanced practitioner order or complex needs related to functional status, cognitive ability, or social support system  Outcome: Progressing     Problem: Knowledge Deficit  Goal: Patient/family/caregiver demonstrates understanding of disease process, treatment plan, medications, and discharge instructions  Description  Complete learning assessment and assess knowledge base    Interventions:  - Provide teaching at level of understanding  - Provide teaching via preferred learning methods  Outcome: Progressing

## 2019-11-07 NOTE — CONSULTS
Consult - Vascular Surgery   Sophiejanuszanamika Kang Guerrero 60 y o  male MRN: [id number]  Unit/Bed#: [facility] 505-01 Encounter: [id number]    Assessment/Plan     Assessment:  61 y/o male s/p recent cardiac catheterization on 8/13/19 presents with thrombus into the medial luminal aspect of left common iliac artery on CTA 11/5/19    Plan:  - Will review CTA with attending and update plan   - Cardiology plan for transfer for repeat catheretization/possible PCI   - Continue plavix, lipitor and ASA  - Lovenox for DVT ppx   - PT recommends Home independently   - rest of care per primary service     History of Present Illness     HPI:  Amada Shah is a 60 y o  male who presents as a transfer from [facility] due to non STEMI type 1 and the need for cardiac catheterization  Patient underwent recent cardiac cath on 8/13/19 with CHARBEL x1 placed to 95% proximal LAD and angioplasty to 90% of 1st diagonal  He was transferred to [place] for repeat cardiac and possible PCI  Chest and abdomen CTA showed no evidence of aneurysm or pulmonary embolus, severe atheroclerotic plaque LAD coronary artery, prominent 6 mm thrombus projecting into the lumen along the medial luminal aspect of left common iliac artery  Patient is poor historian and was unable to obtain proper history  He did complain of left lower extremity pain that is not constant  He did not complain of pain today  Denies n/v/f/sob today  Consults  Review of Systems   Constitutional: Negative  HENT: Negative  Eyes: Negative  Respiratory: Negative  Cardiovascular: Negative  Gastrointestinal: Negative  Musculoskeletal: Negative   Skin:no open lesions noted  Neurological: Negative  Psych: negative         Historical Information   Past Medical History:   Diagnosis Date    Bipolar 1 disorder ([facility] )     CAD (coronary artery disease)     [facility]     Tobacco user      Past Surgical History:   Procedure Laterality Date    BACK SURGERY  CARDIAC SURGERY      stent placement    CHOLECYSTECTOMY      KNEE SURGERY Right      Social History   Social History     Substance and Sexual Activity   Alcohol Use Not Currently    Frequency: Never     Social History     Substance and Sexual Activity   Drug Use Never     Social History     Tobacco Use   Smoking Status Heavy Tobacco Smoker    Packs/day: 1 50    Types: Cigarettes   Smokeless Tobacco Never Used     Family History:   Family History   Problem Relation Age of Onset    Heart disease Mother     Heart disease Father     Multiple sclerosis Sister        Meds/Allergies   Medications Prior to Admission   Medication    aspirin 81 mg chewable tablet    atorvastatin (LIPITOR) 80 mg tablet    clopidogrel (PLAVIX) 75 mg tablet    metoprolol tartrate (LOPRESSOR) 25 mg tablet    nitroglycerin (NITROSTAT) 0 4 mg SL tablet    topiramate (TOPAMAX) 100 mg tablet    ziprasidone (GEODON) 40 mg capsule    sildenafil (VIAGRA) 100 mg tablet     Allergies   Allergen Reactions    Fish-Derived Products Hives    Rice Bean [Phaseolus] Hives       Objective   First Vitals:   Blood Pressure: 118/58 (11/06/19 1808)  Pulse: 59 (11/06/19 1808)  Temperature: 98 4 °F (36 9 °C) (11/06/19 1808)  Temp Source: Oral (11/06/19 1808)  Respirations: 17 (11/06/19 1808)  Height: 5' 10" (177 8 cm) (11/06/19 1808)  Weight - Scale: 104 kg (229 lb 3 2 oz) (11/06/19 1808)  SpO2: 96 % (11/06/19 1808)    Current Vitals:   Blood Pressure: 119/63 (11/07/19 0711)  Pulse: (!) 52 (11/07/19 0711)  Temperature: 98 °F (36 7 °C) (11/07/19 0711)  Temp Source: Oral (11/07/19 0711)  Respirations: 20 (11/07/19 0711)  Height: 5' 10" (177 8 cm) (11/06/19 1808)  Weight - Scale: 104 kg (229 lb 3 2 oz) (11/06/19 1808)  SpO2: 97 % (11/07/19 0711)        /63 (BP Location: Left arm)   Pulse (!) 52   Temp 98 °F (36 7 °C) (Oral)   Resp 20   Ht 5' 10" (1 778 m)   Wt 104 kg (229 lb 3 2 oz)   SpO2 97%   BMI 32 89 kg/m²      General Appearance: Alert, cooperative, no distress   Head:    Normocephalic, without obvious abnormality, atraumatic   Eyes:    PERRL, conjunctiva/corneas clear, EOM's intact        Nose:   Moist mucous membranes   Neck:   Supple, symmetrical, trachea midline   Back:     Symmetric   Lungs:     Respirations unlabored   Heart:    Regular rate and rhythm, S1 and S2 normal, no murmur, rub   or gallop   Abdomen:     Soft, non-tender   Extremities:   MMT 5/5  Pulses:   Palpable pedal pulses right  Palpable PT left and dopplerable DP left  Cap refill greater then 3 seconds  Left digits 1-5 cool touch  Skin:   No open lesions noted or skin discoloration noted  Elongated toenails x10  Neurologic:   Gross sensation is intact  Protective sensation is absent  Lab Results:   Admission on 11/06/2019   Component Date Value    Sodium 11/07/2019 138     Potassium 11/07/2019 4 7     Chloride 11/07/2019 107     CO2 11/07/2019 27     ANION GAP 11/07/2019 4     BUN 11/07/2019 10     Creatinine 11/07/2019 1 00     Glucose 11/07/2019 85     Calcium 11/07/2019 9 4     eGFR 11/07/2019 80     Protime 11/07/2019 13 5     INR 11/07/2019 1 07     PTT 11/07/2019 29     WBC 11/07/2019 6 43     RBC 11/07/2019 5 19     Hemoglobin 11/07/2019 16 9     Hematocrit 11/07/2019 53 2*    MCV 11/07/2019 103*    MCH 11/07/2019 32 6     MCHC 11/07/2019 31 8     RDW 11/07/2019 14 2     Platelets 34/84/2838 224     MPV 11/07/2019 10 3                    Invalid input(s): LABAEARO            Imaging: I have personally reviewed pertinent films in PACS  EKG, Pathology, and Other Studies: I have personally reviewed pertinent reports        Code Status: Level 1 - Full Code  Advance Directive and Living Will:      Power of :    POLST:

## 2019-11-07 NOTE — PROGRESS NOTES
Pt underwent cardiac catheterization;     CORONARY CIRCULATION:  Left main: Normal   LAD: The vessel was normal sized  There was nonobstructive plaque  There were no significant lesions  The site of prior stent placement in 8/19 remains widely patent  The jailed D2 branch is also free of obstructive disease  Circumflex: The vessel was normal sized and gave rise to two OM and three posterolateral branches  No discrete lesions were seen  RCA: The vessel was normal sized and dominant  There was minor nonobstructive plaque  No lesion to account for troponin elevation  Will continue medical management  Continue DAPT with ASA and plavix, as well as beta blocker and statin  Add imdur 30mg daily  Will repeat limited echo to ensure no change in LV function with elevation in troponin  Please note this is considered is NSTEMI Type 2

## 2019-11-08 ENCOUNTER — APPOINTMENT (INPATIENT)
Dept: NON INVASIVE DIAGNOSTICS | Facility: HOSPITAL | Age: 64
DRG: 281 | End: 2019-11-08
Payer: MEDICARE

## 2019-11-08 VITALS
HEIGHT: 70 IN | OXYGEN SATURATION: 98 % | HEART RATE: 56 BPM | BODY MASS INDEX: 32.81 KG/M2 | SYSTOLIC BLOOD PRESSURE: 113 MMHG | TEMPERATURE: 97.6 F | WEIGHT: 229.2 LBS | DIASTOLIC BLOOD PRESSURE: 58 MMHG | RESPIRATION RATE: 18 BRPM

## 2019-11-08 LAB
ANION GAP SERPL CALCULATED.3IONS-SCNC: 9 MMOL/L (ref 4–13)
BUN SERPL-MCNC: 12 MG/DL (ref 5–25)
CALCIUM SERPL-MCNC: 9 MG/DL (ref 8.3–10.1)
CHLORIDE SERPL-SCNC: 110 MMOL/L (ref 100–108)
CO2 SERPL-SCNC: 17 MMOL/L (ref 21–32)
CREAT SERPL-MCNC: 0.92 MG/DL (ref 0.6–1.3)
ERYTHROCYTE [DISTWIDTH] IN BLOOD BY AUTOMATED COUNT: 13.8 % (ref 11.6–15.1)
GFR SERPL CREATININE-BSD FRML MDRD: 89 ML/MIN/1.73SQ M
GLUCOSE SERPL-MCNC: 88 MG/DL (ref 65–140)
HCT VFR BLD AUTO: 52.5 % (ref 36.5–49.3)
HGB BLD-MCNC: 16.5 G/DL (ref 12–17)
MCH RBC QN AUTO: 32.5 PG (ref 26.8–34.3)
MCHC RBC AUTO-ENTMCNC: 31.4 G/DL (ref 31.4–37.4)
MCV RBC AUTO: 103 FL (ref 82–98)
PLATELET # BLD AUTO: 166 THOUSANDS/UL (ref 149–390)
PMV BLD AUTO: 10.5 FL (ref 8.9–12.7)
POTASSIUM SERPL-SCNC: 4.6 MMOL/L (ref 3.5–5.3)
RBC # BLD AUTO: 5.08 MILLION/UL (ref 3.88–5.62)
SODIUM SERPL-SCNC: 136 MMOL/L (ref 136–145)
WBC # BLD AUTO: 9.59 THOUSAND/UL (ref 4.31–10.16)

## 2019-11-08 PROCEDURE — 85027 COMPLETE CBC AUTOMATED: CPT | Performed by: INTERNAL MEDICINE

## 2019-11-08 PROCEDURE — 93321 DOPPLER ECHO F-UP/LMTD STD: CPT | Performed by: INTERNAL MEDICINE

## 2019-11-08 PROCEDURE — 93308 TTE F-UP OR LMTD: CPT

## 2019-11-08 PROCEDURE — 80048 BASIC METABOLIC PNL TOTAL CA: CPT | Performed by: INTERNAL MEDICINE

## 2019-11-08 PROCEDURE — 99232 SBSQ HOSP IP/OBS MODERATE 35: CPT | Performed by: INTERNAL MEDICINE

## 2019-11-08 PROCEDURE — 93325 DOPPLER ECHO COLOR FLOW MAPG: CPT | Performed by: INTERNAL MEDICINE

## 2019-11-08 PROCEDURE — 99239 HOSP IP/OBS DSCHRG MGMT >30: CPT | Performed by: NURSE PRACTITIONER

## 2019-11-08 PROCEDURE — 93308 TTE F-UP OR LMTD: CPT | Performed by: INTERNAL MEDICINE

## 2019-11-08 RX ORDER — ISOSORBIDE MONONITRATE 30 MG/1
30 TABLET, EXTENDED RELEASE ORAL DAILY
Qty: 30 TABLET | Refills: 0 | Status: SHIPPED | OUTPATIENT
Start: 2019-11-09 | End: 2019-12-03 | Stop reason: SDUPTHER

## 2019-11-08 RX ADMIN — ISOSORBIDE MONONITRATE 30 MG: 30 TABLET, EXTENDED RELEASE ORAL at 09:27

## 2019-11-08 RX ADMIN — TOPIRAMATE 100 MG: 100 TABLET, FILM COATED ORAL at 09:27

## 2019-11-08 RX ADMIN — ASPIRIN 81 MG 81 MG: 81 TABLET ORAL at 09:27

## 2019-11-08 RX ADMIN — METOPROLOL TARTRATE 25 MG: 25 TABLET ORAL at 09:27

## 2019-11-08 RX ADMIN — CLOPIDOGREL BISULFATE 75 MG: 75 TABLET ORAL at 09:27

## 2019-11-08 RX ADMIN — ZIPRASIDONE HCL 40 MG: 40 CAPSULE ORAL at 09:27

## 2019-11-08 NOTE — DISCHARGE SUMMARY
Discharge Summary - [place] [address] Internal Medicine    Patient Information: Mohan Vargas 60 y o  male MRN: [id number]  Unit/Bed#: [facility] 505-01 Encounter: [id number]    Discharging Physician / Practitioner: ALHAJI Armendariz  PCP: Estiven Ponce  Admission Date: 11/6/2019  Discharge Date: 11/08/19    Reason for Admission:  Chest pain, NSTEMI type 2    Discharge Diagnoses:     Principal Problem:    Type 2 myocardial infarction without [facility] elevation ([facility] )  Active Problems:    Coronary artery disease involving native coronary artery of native heart with unstable angina pectoris ([facility] [facility] )    Bipolar 1 disorder ([facility] )    Tobacco user    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[facility] )  Resolved Problems:    * No resolved hospital problems  *      Consultations During Hospital Stay:  · Cardiology  · Podiatry  · Vascular surgery     Procedures Performed:     Cardiac catheterization 11/7:   Left main: Normal   --  LAD: The vessel was normal sized  There was nonobstructive plaque  There were no significant lesions  The site of prior stent placement in 8/19 remains widely patent  The jailed D2 branch is also free of obstructive disease  --  Circumflex: The vessel was normal sized and gave rise to two OM and three posterolateral branches  No discrete lesions were seen  --  RCA: The vessel was normal sized and dominant  There was minor nonobstructive plaque  Significant Findings / Test Results:     · Troponin 1 15/4 89/5 49/6 07/4 38  · Lipid panel total cholesterol 145, triglycerides 71, HDL 41, LDL 90  · CBC/CMP unremarkable  · TSH 1 596      Incidental Findings:   · None     Test Results Pending at Discharge (will require follow up):    · Echocardiogram pending final read however okay for discharge per cardiologist Dr Delaney Moreno:  · Follow-up with Cardiology as scheduled on 12/03  · Outpatient follow-up with PCP  · Outpatient follow-up with Podiatry  · Would recommend outpatient dental exam  · Recommend Outpatient arterial duplex of lower extremities to obtain baseline extent of arterial disease given incidental finding of atherosclerosis of proximal left common iliac artery    Complications:  None    Hospital Course:     Katie Buenrostro is a 60 y o  male patient with past medical history of CAD status post recent LAD stent, PAD, tobacco use, bipolar disorder who [facility] due to chest pain with elevated troponins  Patient was transferred here for cardiac catheterization which did not demonstrate any lesions to account for troponin elevation  Recommending continuing medical management with aspirin, Plavix, beta-blocker and statin  He was started on Imdur for possible spasm  He was also seen in consultation by Podiatry for nail care and all 10 toes nails were clipped by Podiatry  They are recommending outpatient follow-up  There was also an incidental finding of a small thrombus along the medial wall of proximal left common iliac artery is noted on CTA  Patient was seen in consultation by vascular surgery who feels that this is associated with atherosclerosis rather than embolic etiology  He is completely asymptomatic and denies any lower extremity pain  Pulses are +2 and palpable  Motor and sensory are intact  Will recommend the patient have an outpatient arterial duplex of lower extremities to obtain baseline status of the extent of his arterial disease should there be any change in exam at a later point  This can be done as an outpatient as per his PCP  Patient has been seen by Cardiology  His echocardiogram is pending in the computer however has been reviewed by Cardiology  Per Dr Dary White, he is stable for d/c from a cardiology standpoint  Patient has an appointment to follow up with Cardiology on December 3rd  He should also follow up with PCP for recommended LEADS as above    Would recommend continued outpatient podiatry follow-up and would also recommended outpatient dental exam as patient has significant dental disease on examination  Condition at Discharge: stable     Discharge Day Visit / Exam:     Subjective:  Patient offers no complaints  Denies any chest pain, shortness of breath  No lower extremity pain  Vitals: Blood Pressure: 113/58 (11/08/19 0744)  Pulse: 56 (11/08/19 0744)  Temperature: 97 6 °F (36 4 °C) (11/08/19 0744)  Temp Source: Oral (11/08/19 0744)  Respirations: 18 (11/08/19 0744)  Height: 5' 10" (177 8 cm) (11/06/19 1808)  Weight - Scale: 104 kg (229 lb 3 2 oz) (11/06/19 1808)  SpO2: 98 % (11/08/19 0744)     Exam:   Physical Exam   Constitutional: He is oriented to person, place, and time  No distress  HENT:   Head: Normocephalic  Poor dentition, significant dental disease on exam   Eyes: Conjunctivae are normal    Neck: Normal range of motion  Cardiovascular: Normal rate  Pulmonary/Chest: Breath sounds normal    Abdominal: Bowel sounds are normal    Musculoskeletal: Normal range of motion  He exhibits no edema  Neurological: He is alert and oriented to person, place, and time  Skin: Skin is warm  Right wrist with dressing CDI, no ecchymosis or hematoma noted   Psychiatric: He has a normal mood and affect  Nursing note and vitals reviewed  Discussion with Family:  Patient    Discharge instructions/Information to patient and family:   See after visit summary for information provided to patient and family  Provisions for Follow-Up Care:  See after visit summary for information related to follow-up care and any pertinent home health orders  Disposition:     Home    For Discharges to [facility]:   · Not Applicable to this Patient - Not Applicable to this Patient    Planned Readmission: no     Discharge Statement:  I spent 45 minutes discharging the patient  This time was spent on the day of discharge  I had direct contact with the patient on the day of discharge  Greater than 50% of the total time was spent examining patient, answering all patient questions, arranging and discussing plan of care with patient as well as directly providing post-discharge instructions  Additional time then spent on discharge activities  Discharge Medications:  See after visit summary for reconciled discharge medications provided to patient and family        ** Please Note: This note has been constructed using a voice recognition system **

## 2019-11-08 NOTE — DISCHARGE INSTR - AVS FIRST PAGE
1  Please see the post cardiac catheterization dishcarge instructions  No heavy lifting, greater than 10 lbs  or strenuous  activity for 48 hrs  2 Remove band aid tomorrow  Shower and wash area- wrist gently with soap and water- beginning tomorrow  Rinse and pat dry  Apply new water seal band aid  Repeat this process for 5 days  No powders, creams lotions or antibiotic ointments  for 5 days  No tub baths, hot tubs or swimming for 5 days  3  Please call our office ([phone]) if you have any fever, redness, swelling, discharge from your wrist access site

## 2019-11-08 NOTE — PLAN OF CARE
Problem: Potential for Falls  Goal: Patient will remain free of falls  Description  INTERVENTIONS:  - Assess patient frequently for physical needs  -  Identify cognitive and physical deficits and behaviors that affect risk of falls    -  [place] fall precautions as indicated by assessment   - Educate patient/family on patient safety including physical limitations  - Instruct patient to call for assistance with activity based on assessment  - Modify environment to reduce risk of injury  - Consider OT/PT consult to assist with strengthening/mobility  Outcome: Progressing     Problem: PAIN - ADULT  Goal: Verbalizes/displays adequate comfort level or baseline comfort level  Description  Interventions:  - Encourage patient to monitor pain and request assistance  - Assess pain using appropriate pain scale  - Administer analgesics based on type and severity of pain and evaluate response  - Implement non-pharmacological measures as appropriate and evaluate response  - Consider cultural and social influences on pain and pain management  - Notify physician/advanced practitioner if interventions unsuccessful or patient reports new pain  Outcome: Progressing     Problem: INFECTION - ADULT  Goal: Absence or prevention of progression during hospitalization  Description  INTERVENTIONS:  - Assess and monitor for signs and symptoms of infection  - Monitor lab/diagnostic results  - Monitor all insertion sites, i e  indwelling lines, tubes, and drains  - Monitor endotracheal if appropriate and nasal secretions for changes in amount and color  - [place] appropriate cooling/warming therapies per order  - Administer medications as ordered  - Instruct and encourage patient and family to use good hand hygiene technique  - Identify and instruct in appropriate isolation precautions for identified infection/condition  Outcome: Progressing  Goal: Absence of fever/infection during neutropenic period  Description  INTERVENTIONS:  - Monitor WBC    Outcome: Progressing     Problem: SAFETY ADULT  Goal: Maintain or return to baseline ADL function  Description  INTERVENTIONS:  -  Assess patient's ability to carry out ADLs; assess patient's baseline for ADL function and identify physical deficits which impact ability to perform ADLs (bathing, care of mouth/teeth, toileting, grooming, dressing, etc )  - Assess/evaluate cause of self-care deficits   - Assess range of motion  - Assess patient's mobility; develop plan if impaired  - Assess patient's need for assistive devices and provide as appropriate  - Encourage maximum independence but intervene and supervise when necessary  - Involve family in performance of ADLs  - Assess for home care needs following discharge   - Consider OT consult to assist with ADL evaluation and planning for discharge  - Provide patient education as appropriate  Outcome: Progressing  Goal: Maintain or return mobility status to optimal level  Description  INTERVENTIONS:  - Assess patient's baseline mobility status (ambulation, transfers, stairs, etc )    - Identify cognitive and physical deficits and behaviors that affect mobility  - Identify mobility aids required to assist with transfers and/or ambulation (gait belt, sit-to-stand, lift, walker, cane, etc )  - [place] fall precautions as indicated by assessment  - Record patient progress and toleration of activity level on Mobility SBAR; progress patient to next Phase/Stage  - Instruct patient to call for assistance with activity based on assessment  - Consider rehabilitation consult to assist with strengthening/weightbearing, etc   Outcome: Progressing     Problem: DISCHARGE PLANNING  Goal: Discharge to home or other facility with appropriate resources  Description  INTERVENTIONS:  - Identify barriers to discharge w/patient and caregiver  - Arrange for needed discharge resources and transportation as appropriate  - Identify discharge learning needs (meds, wound care, etc )  - Arrange for interpretive services to assist at discharge as needed  - Refer to Case Management Department for coordinating discharge planning if the patient needs post-hospital services based on physician/advanced practitioner order or complex needs related to functional status, cognitive ability, or social support system  Outcome: Progressing     Problem: Knowledge Deficit  Goal: Patient/family/caregiver demonstrates understanding of disease process, treatment plan, medications, and discharge instructions  Description  Complete learning assessment and assess knowledge base    Interventions:  - Provide teaching at level of understanding  - Provide teaching via preferred learning methods  Outcome: Progressing

## 2019-11-08 NOTE — SOCIAL WORK
Informed by Steph Mcdaniel from [facility] that pt is medically cleared & will need transport home  Pt completed lyft waiver & sent to medical records  S/w Ibrahima Bassett at [address] up for lyft at 1400 to [address] in [place] in [place], [address] [place] [address] where pt's truck is at  No other CM needs per SLIM  Updated RN & pt

## 2019-11-08 NOTE — PROGRESS NOTES
General Cardiology Progress Note   Sharlene Guerrero 60 y o  male MRN: [id number]  Unit/Bed#: [facility] 505-01 Encounter: [id number]      Assessment:  Principal Problem:    Type 2 myocardial infarction without ST elevation ([facility] 75 )  Active Problems:    Coronary artery disease involving native coronary artery of native heart with unstable angina pectoris ([facility] )    Bipolar 1 disorder ([facility] 75 )    Tobacco user    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[facility])      Impression:     CAD - recent LAD stent with Angioplasty jailed D2, repeat cath for type 2 MI and peak trop 6 was normal   No stenosis at D2 ostium, LAD stent patent  ECHO shows normal EF, no RWMA   HTN - controlled   HL - controlled    Plan:     Started Imdur for possible spasm   Tolerating current meds   Stable for DC to see Dr Crow Lozano in f/u    Subjective:   Patient seen and examined  No chest pain, no shortness of breath, troponins down trending, blood pressure is controlled, tolerating the initiation of Imdur  Review of Systems   Constitution: Negative for diaphoresis, fever, malaise/fatigue and night sweats  Cardiovascular: Negative for chest pain, dyspnea on exertion, leg swelling, orthopnea, palpitations and syncope  Respiratory: Negative for cough, shortness of breath, sputum production and wheezing  Skin: Negative for itching and rash  Gastrointestinal: Negative for abdominal pain, change in bowel habit and diarrhea  Neurological: Negative for dizziness, focal weakness, light-headedness and weakness  Objective   Vitals: Blood pressure 113/58, pulse 56, temperature 97 6 °F (36 4 °C), temperature source Oral, resp  rate 18, height 5' 10" (1 778 m), weight 104 kg (229 lb 3 2 oz), SpO2 98 %  , Body mass index is 32 89 kg/m² , I/O last 3 completed shifts:   In: 555 [P O :180; I V :375]  Out: 1750 [[id number]]  I/O this shift:  In: 180 [P O :180]  Out: 500 [Urine:500]  Wt Readings from Last 3 Encounters:   11/06/19 104 kg (229 lb 3 2 oz) 11/05/19 109 kg (241 lb)   08/16/19 105 kg (232 lb)       Intake/Output Summary (Last 24 hours) at 11/8/2019 1017  Last data filed at 11/8/2019 0900  Gross per 24 hour   Intake 735 ml   Output 1350 ml   Net -615 ml     I/O last 3 completed shifts: In: 0 [P O :180; I V :375]  Out: 1833 [Urine:1750]    No significant arrhythmias seen on telemetry review  Physical Exam   Constitutional: He is oriented to person, place, and time  No distress  HENT:   Head: Normocephalic and atraumatic  Neck: Normal range of motion  Neck supple  No JVD present  Cardiovascular: Normal rate, regular rhythm, normal heart sounds and intact distal pulses  No murmur heard  Pulmonary/Chest: Effort normal and breath sounds normal  No respiratory distress  He has no wheezes  He has no rales  Abdominal: Soft  Bowel sounds are normal  He exhibits no distension  There is no tenderness  Musculoskeletal: He exhibits no edema  Neurological: He is alert and oriented to person, place, and time  Skin: Skin is warm and dry  He is not diaphoretic         Meds/Allergies   Allergies   Allergen Reactions    Fish-Derived Products Hives    Rice Bean [Phaseolus] Hives       Current Facility-Administered Medications:     acetaminophen (TYLENOL) tablet 650 mg, 650 mg, Oral, Q6H PRN, Joan Marlow, DO    aspirin chewable tablet 81 mg, 81 mg, Oral, Daily, Joan Marlow, DO, 81 mg at 11/08/19 0927    atorvastatin (LIPITOR) tablet 80 mg, 80 mg, Oral, QPM, Joan Marlow, DO, 80 mg at 11/07/19 1704    clopidogrel (PLAVIX) tablet 75 mg, 75 mg, Oral, Daily, Joan Marlow, DO, 75 mg at 11/08/19 5460    diphenhydrAMINE (BENADRYL) tablet 25 mg, 25 mg, Oral, Q6H PRN, Joan Marlow, DO    docusate sodium (COLACE) capsule 100 mg, 100 mg, Oral, BID PRN, Joan Marlow, DO    isosorbide mononitrate (IMDUR) 24 hr tablet 30 mg, 30 mg, Oral, Daily, Benjamen Honey, CRNP, 30 mg at 11/08/19 7918    levalbuterol (XOPENEX) inhalation solution 1 25 mg, 1 25 mg, Nebulization, Q6H PRN, Jenny Naas, DO    LORazepam (ATIVAN) tablet 1 mg, 1 mg, Oral, Q8H PRN, Jenny Naas, DO    magnesium hydroxide (MILK OF MAGNESIA) 400 mg/5 mL oral suspension 30 mL, 30 mL, Oral, BID PRN, Jenny Naas, DO    metoprolol tartrate (LOPRESSOR) tablet 25 mg, 25 mg, Oral, Q12H PROSPER, Jenny Naas, DO, 25 mg at 11/08/19 3494    morphine injection 2 mg, 2 mg, Intravenous, Q4H PRN, Jenny Naas, DO    nitroglycerin (NITROSTAT) SL tablet 0 4 mg, 0 4 mg, Sublingual, Q5 Min PRN, Jenny Naas, DO    ondansetron [facility]) injection 4 mg, 4 mg, Intravenous, Q4H PRN, Jenny Naas, DO    oxyCODONE (ROXICODONE) IR tablet 5 mg, 5 mg, Oral, Q4H PRN, Jenny Naas, DO    topiramate (TOPAMAX) tablet 100 mg, 100 mg, Oral, TID, Jenny Naas, DO, 100 mg at 11/08/19 4215    ziprasidone (GEODON) capsule 40 mg, 40 mg, Oral, BID, Jenny Naas, DO, 40 mg at 11/08/19 2244    Laboratory Results:  Results from last 7 days   Lab Units 11/06/19  1021 11/06/19  0606 11/06/19  0124   TROPONIN I ng/mL 4 38* 6 07* 5 49*       CBC with diff:   Results from last 7 days   Lab Units 11/08/19  0611 11/07/19  0613 11/06/19  0606 11/05/19  1943   WBC Thousand/uL 9 59 6 43 9 17 15 30*   HEMOGLOBIN g/dL 16 5 16 9 15 8 16 0   HEMATOCRIT % 52 5* 53 2* 47 9 47 6   MCV fL 103* 103* 101* 99*   PLATELETS Thousands/uL 166 224 234 247   MCH pg 32 5 32 6 33 3 33 1   MCHC g/dL 31 4 31 8 33 0 33 6   RDW % 13 8 14 2 13 8 13 7   MPV fL 10 5 10 3 10 1 10 2   NRBC AUTO /100 WBCs  --   --   --  0       CMP:  Results from last 7 days   Lab Units 11/08/19  0611 11/07/19  0613 11/06/19  0606 11/05/19  1943   POTASSIUM mmol/L 4 6 4 7 3 9 3 3*   CHLORIDE mmol/L 110* 107 103 102   CO2 mmol/L 17* 27 26 23   BUN mg/dL 12 10 5 6   CREATININE mg/dL 0 92 1 00 0 85 1 09   CALCIUM mg/dL 9 0 9 4 8 7 8 8   AST U/L  --   --  25 34   ALT U/L  --   --  17 20   ALK PHOS U/L  --   --  69 75   EGFR ml/min/1 73sq m 89 80 93 72       BMP:  Results from last 7 days Lab Units 19  0611 19  0613 19  0606 19  1943   POTASSIUM mmol/L 4 6 4 7 3 9 3 3*   CHLORIDE mmol/L 110* 107 103 102   CO2 mmol/L 17* 27 26 23   BUN mg/dL 12 10 5 6   CREATININE mg/dL 0 92 1 00 0 85 1 09   CALCIUM mg/dL 9 0 9 4 8 7 8 8       NT-proBNP:   Recent Labs     19   NTBNP 253*        Magnesium:   Results from last 7 days   Lab Units 19   MAGNESIUM mg/dL 2 1       Coags:   Results from last 7 days   Lab Units 19   PTT seconds 29   INR  1 07       TSH:    Results from last 7 days   Lab Units 19   TSH 3RD GENERATON uIU/mL 1 596       Hemoglobin A1C )      Lipid Profile:   No results found for: CHOL  Lab Results   Component Value Date    HDL 41 2019    HDL 42 2019     Lab Results   Component Value Date    LDLCALC 90 2019    [address] 85 2019     No results found for: LDLDIRECT  Lab Results   Component Value Date    TRIG 71 2019    TRIG 75 2019       Cardiac testing:   EKG personally reviewed by Ivan Tobar MD      Results for orders placed during the hospital encounter of 19   Echo complete with contrast if indicated    Narrative [facility]  [address]  [phone]    Transthoracic Echocardiogram  2D, M-mode, Doppler, and Color Doppler    Study date:  14-Aug-2019    Patient: Edgardo Mckeon  MR number: [id number]  Account number: [de-identified]  : 1955  Age: 66 years  Gender: Male  Status: Inpatient  Location: Bedside  Height: 70 in  Weight: 232 5 lb  BP: 121/ 74 mmHg    Indications: Myocardial infarction      Diagnoses: I25 119 - Atherosclerotic heart disease of native coronary artery with unspecified angina pectoris    Sonographer:  Ahmed Cowden, RDCS  Referring Physician:  Alex Kumari MD  Group:  [facility]  Cardiology Fellow:  Catrina Lazo MD  Interpreting Physician:  Km Boone MD    SUMMARY    LEFT VENTRICLE:  Systolic function was normal  Ejection fraction was estimated to be 55 %  There was possible hypokinesis of the apical wall(s)  AORTIC VALVE:  There was mild regurgitation  HISTORY: PRIOR HISTORY: Heavy smoker, bipolar disorder, schizo-affective psychosis  PROCEDURE: The procedure was performed at the bedside  This was a routine study  The transthoracic approach was used  The study included complete 2D imaging, M-mode, complete spectral Doppler, and color Doppler  The heart rate was 48 bpm,  at the start of the study  Images were obtained from the parasternal, apical, subcostal, and suprasternal notch acoustic windows  Image quality was adequate  LEFT VENTRICLE: Size was normal  Systolic function was normal  Ejection fraction was estimated to be 55 %  There was possible hypokinesis of the apical wall(s)  Wall thickness was normal  DOPPLER: The ratio of early ventricular filling to  atrial contraction velocities was within the normal range  RIGHT VENTRICLE: The size was normal  Systolic function was normal     LEFT ATRIUM: The atrium was mildly dilated  RIGHT ATRIUM: Size was normal     MITRAL VALVE: Valve structure was normal  There was normal leaflet separation  DOPPLER: There was no evidence for stenosis  There was trace regurgitation  AORTIC VALVE: The valve was trileaflet  Leaflets exhibited mildly increased thickness and normal cuspal separation  DOPPLER: There was no evidence for stenosis  There was mild regurgitation  TRICUSPID VALVE: The valve structure was normal  There was normal leaflet separation  DOPPLER: There was no evidence for stenosis  There was trace regurgitation  PULMONIC VALVE: Leaflets exhibited normal thickness, no calcification, and normal cuspal separation  DOPPLER: The transpulmonic velocity was within the normal range  There was no significant regurgitation  PERICARDIUM: There was no pericardial effusion   The pericardium was normal in appearance  AORTA: The root exhibited normal size  SYSTEMIC VEINS: IVC: The inferior vena cava was normal in size  SYSTEM MEASUREMENT TABLES    2D  %FS: 26 16 %  Ao Diam: 3 53 cm  EDV(Teich): 190 01 ml  EF(Teich): 50 41 %  ESV(Teich): 94 22 ml  IVSd: 0 79 cm  LA Area: 21 81 cm2  LA Diam: 4 22 cm  LVEDV MOD A4C: 153 33 ml  LVEF MOD A4C: 51 31 %  LVESV MOD A4C: 74 66 ml  LVIDd: 6 14 cm  LVIDs: 4 54 cm  LVLd A4C: 8 84 cm  LVLs A4C: 7 94 cm  LVPWd: 0 83 cm  RA Area: 17 23 cm2  RVIDd: 3 cm  SV MOD A4C: 78 67 ml  SV(Teich): 95 78 ml    MM  TAPSE: 2 2 cm    PW  E': 0 06 m/s  E/E': 13 36  MV A Moises: 0 68 m/s  MV Dec [place]: 3 38 m/s2  MV DecT: 245 06 ms  MV E Moises: 0 83 m/s  MV E/A Ratio: 1 21  MV PHT: 71 07 ms  MVA By PHT: 3 1 cm2    [employer] Accredited Echocardiography Laboratory    Prepared and electronically signed by    Enrico Dutton MD  Signed 15-Aug-2019 08:55:39       No results found for this or any previous visit  Results for orders placed during the hospital encounter of 19   Cardiac catheterization    Narrative [facility] [address]  [address] [place] [address]  [phone]    [facility]    Invasive Cardiovascular Lab Complete Report    Patient: Mann Kapoor  MR number: [id number]  Account number: [de-identified]  Study date: 2019  Gender: Male  : 1955  Height: 70 1 in  Weight: 228 8 lb  BSA: 2 21 mï¾²    Allergies: FISH-DERIVED PRODUCTS, PHASEOLUS    Diagnostic Cardiologist:  Tolu Hui MD  Primary Physician:  Clarence Nissen, CRNP    SUMMARY    CORONARY CIRCULATION:  Left main: Normal   LAD: The vessel was normal sized  There was nonobstructive plaque  There were no significant lesions  The site of prior stent placement in  remains widely patent  The jailed D2 branch is also free of obstructive disease  Circumflex: The vessel was normal sized and gave rise to two OM and three posterolateral branches   No discrete lesions were seen  RCA: The vessel was normal sized and dominant  There was minor nonobstructive plaque  REPORT ELEMENT SELECTION:  Right radial access was employed  Summary:  There was mild non-obstructive plaque  The stent implanted in 8/19 is widely patent  INDICATIONS:  --  Possible CAD: myocardial infarction without ST elevation (NSTEMI)  PROCEDURES PERFORMED    --  Left coronary angiography  --  Right coronary angiography  --  Inpatient  --  Mod Sedation Same Physician Initial 15min  --  Coronary Catheterization (w/o [facility])  --  Mod Sedation Same Physician Add 15min  PROCEDURE: The risks and alternatives of the procedures and conscious sedation were explained to the patient and informed consent was obtained  The patient was brought to the cath lab and placed on the table  The planned puncture sites  were prepped and draped in the usual sterile fashion  --  Right radial artery access  After performing an Saturnino's test to verify adequate ulnar artery supply to the hand, the radial site was prepped  The puncture site was infiltrated with local anesthetic  The vessel was accessed using the  modified Seldinger technique, a wire was advanced into the vessel, and a sheath was advanced over the wire into the vessel  --  Left coronary artery angiography  A catheter was advanced over a guidewire into the aorta and positioned in the left coronary artery ostium under fluoroscopic guidance  Angiography was performed  --  Right coronary artery angiography  A catheter was advanced over a guidewire into the aorta and positioned in the right coronary artery ostium under fluoroscopic guidance  Angiography was performed  --  Inpatient  --  Mod Sedation Same Physician Initial 15min  --  Coronary Catheterization (w/o [facility])  --  Mod Sedation Same Physician Add 15min  PROCEDURE COMPLETION: The patient tolerated the procedure well and was discharged from the cath lab   TIMING: Test started at 12:14  Test concluded at 12:33  HEMOSTASIS: The sheath was removed  The site was compressed with a Hemoband  device  Hemostasis was obtained  MEDICATIONS GIVEN: Verapamil (Isoptin, Calan, Covera), 1 25 mg, IA, at 12:21  Fentanyl (1OOmcg/2 ml), 50 mcg, IV, at 12:10  Versed (2mg/2ml), 2 mg, IV, at 12:10  1% Lidocaine, 0 1 ml, subcutaneously, at  12:16  Nitroglycerin (200mcg/ml), 200 mcg, at 12:21  Heparin 1000 units/ml, 4,000 units, IV, at 12:22  CONTRAST GIVEN: 50 ml Omnipaque (350mg I /ml)  RADIATION EXPOSURE: Fluoroscopy time: 4 min  CORONARY VESSELS:   --  Left main: Normal   --  LAD: The vessel was normal sized  There was nonobstructive plaque  There were no significant lesions  The site of prior stent placement in  remains widely patent  The jailed D2 branch is also free of obstructive disease  --  Circumflex: The vessel was normal sized and gave rise to two OM and three posterolateral branches  No discrete lesions were seen  --  RCA: The vessel was normal sized and dominant  There was minor nonobstructive plaque  NOTE: Right radial access was employed  Prepared and signed by    Kemi Bonilla MD  Signed 2019 12:40:18    CC: Dr Nydia Lindsey    Study diagram    Hemodynamic tables    Pressures:  Baseline  Pressures:  - HR: 41  Pressures:  - Rhythm:  Pressures:  -- Aortic Pressure (S/D/M): 93/61/74    Outputs:  Baseline  Outputs:  -- CALCULATIONS: [age]  Outputs:  -- CALCULATIONS: Body Surface Area: 2 21  Outputs:  -- CALCULATIONS: Height in cm: 178 00  Outputs:  -- CALCULATIONS: Sex: Male  Outputs:  -- CALCULATIONS: Weight in k 00       No results found for this or any previous visit  No results found for this or any previous visit  No results found for this or any previous visit  Marie Gonzalez MD    Portions of the record may have been created with voice recognition software    Occasional wrong word or "sound a like" substitutions may have occurred due to the inherent limitations of voice recognition software  Read the chart carefully and recognize, using context, where substitutions have occurred

## 2019-11-09 PROCEDURE — 93926 LOWER EXTREMITY STUDY: CPT | Performed by: SURGERY

## 2019-11-09 PROCEDURE — 93922 UPR/L XTREMITY ART 2 LEVELS: CPT | Performed by: SURGERY

## 2019-11-11 ENCOUNTER — EPISODE CHANGES (OUTPATIENT)
Dept: CASE MANAGEMENT | Facility: HOSPITAL | Age: 64
End: 2019-11-11

## 2019-11-11 ENCOUNTER — TELEPHONE (OUTPATIENT)
Dept: CARDIOLOGY CLINIC | Facility: CLINIC | Age: 64
End: 2019-11-11

## 2019-11-11 NOTE — TELEPHONE ENCOUNTER
Patient called wanting a return to work note  He would like to return to work this Thursday  I explained to him that his PCP could give him the note but he stated he does not want to continue with them  Please advise and call patient with further instructions  I also notified the patient that we are VERY busy today and will get to this task ASAP

## 2019-11-11 NOTE — LETTER
November 11, 2019     Yamile Guerrero  [address]    Patient: Ekta Guerrero   YOB: 1955   Date of Visit: 11/11/2019       To whom it may concern,    There is no cardiac contra-indication for my patient Mr Guerrero to return to work without limitations  Please call with any questions       Sincerely,        Marlen Gonzalesa Force  11/11/2019  9:39 AM  Signed  Patient called wanting a return to work note  He would like to return to work this Thursday  I explained to him that his PCP could give him the note but he stated he does not want to continue with them  Please advise and call patient with further instructions  I also notified the patient that we are VERY busy today and will get to this task ASAP

## 2019-11-11 NOTE — LETTER
November 13, 2019     No Recipients    Patient: Elly Guerrero   YOB: 1955   Date of Visit: 11/11/2019       Dear Dr Kassandra Tena Recipients: Thank you for referring Radha Reed to me for evaluation  Below are my notes for this consultation  If you have questions, please do not hesitate to call me  I look forward to following your patient along with you  Sincerely,        Trista Griffith        CC: No Recipients  [facility]  11/11/2019  9:39 AM  Signed  Patient called wanting a return to work note  He would like to return to work this Thursday  I explained to him that his PCP could give him the note but he stated he does not want to continue with them  Please advise and call patient with further instructions  I also notified the patient that we are VERY busy today and will get to this task ASAP

## 2019-11-13 ENCOUNTER — PATIENT OUTREACH (OUTPATIENT)
Dept: CASE MANAGEMENT | Facility: OTHER | Age: 64
End: 2019-11-13

## 2019-11-15 NOTE — PROGRESS NOTES
Patient denies chest pain, SOB, aches, chills, n/v, edema  He states his insertion site is clean, dry, & healed over  Patient has all meds & states he is taking them appropriately  He has not followed up with his PCP because he states she is no longer with the practice & he wants a new doctor  Patient has specific requirements  A few doctors in the [address] [place] [address] area meet his criteria-Dr Jacy Smith, Dr Tessy Newman, Dr Milton Gip  Will mail contact info per his request     He states he is new to the area  He has transportation  He can afford his co-pays, but does not know if he can afford the costs that medicare doesn't cover  He already went to  & said he was told he doesn't qualify for any of their programs  Introduced him to the bundle program & he is agreeable to outreach  Will mail [address] letter

## 2019-12-03 ENCOUNTER — PATIENT OUTREACH (OUTPATIENT)
Dept: CASE MANAGEMENT | Facility: OTHER | Age: 64
End: 2019-12-03

## 2019-12-03 ENCOUNTER — OFFICE VISIT (OUTPATIENT)
Dept: CARDIOLOGY CLINIC | Facility: CLINIC | Age: 64
End: 2019-12-03
Payer: MEDICARE

## 2019-12-03 ENCOUNTER — TELEPHONE (OUTPATIENT)
Dept: FAMILY MEDICINE CLINIC | Facility: CLINIC | Age: 64
End: 2019-12-03

## 2019-12-03 VITALS
HEIGHT: 70 IN | HEART RATE: 61 BPM | WEIGHT: 227 LBS | BODY MASS INDEX: 32.5 KG/M2 | OXYGEN SATURATION: 97 % | SYSTOLIC BLOOD PRESSURE: 126 MMHG | DIASTOLIC BLOOD PRESSURE: 78 MMHG

## 2019-12-03 DIAGNOSIS — I21.4 MI, ACUTE, NON ST SEGMENT ELEVATION (HCC): ICD-10-CM

## 2019-12-03 DIAGNOSIS — I25.110 CORONARY ARTERY DISEASE INVOLVING NATIVE CORONARY ARTERY OF NATIVE HEART WITH UNSTABLE ANGINA PECTORIS (HCC): Primary | ICD-10-CM

## 2019-12-03 DIAGNOSIS — J41.0 SIMPLE CHRONIC BRONCHITIS (HCC): ICD-10-CM

## 2019-12-03 DIAGNOSIS — I21.A1 TYPE 2 MYOCARDIAL INFARCTION WITHOUT ST ELEVATION (HCC): ICD-10-CM

## 2019-12-03 DIAGNOSIS — I20.1 CORONARY ARTERY SPASM (HCC): ICD-10-CM

## 2019-12-03 DIAGNOSIS — I20.8 CHRONIC STABLE ANGINA (HCC): ICD-10-CM

## 2019-12-03 DIAGNOSIS — Z78.9 NEEDS ASSISTANCE WITH COMMUNITY RESOURCES: Primary | ICD-10-CM

## 2019-12-03 PROCEDURE — 93000 ELECTROCARDIOGRAM COMPLETE: CPT | Performed by: INTERNAL MEDICINE

## 2019-12-03 PROCEDURE — 99215 OFFICE O/P EST HI 40 MIN: CPT | Performed by: INTERNAL MEDICINE

## 2019-12-03 RX ORDER — RANOLAZINE 500 MG/1
500 TABLET, EXTENDED RELEASE ORAL 2 TIMES DAILY
Qty: 180 TABLET | Refills: 3 | Status: SHIPPED | OUTPATIENT
Start: 2019-12-03 | End: 2020-01-09 | Stop reason: SDUPTHER

## 2019-12-03 RX ORDER — ATORVASTATIN CALCIUM 80 MG/1
80 TABLET, FILM COATED ORAL EVERY EVENING
Qty: 90 TABLET | Refills: 3 | Status: SHIPPED | OUTPATIENT
Start: 2019-12-03 | End: 2020-01-09 | Stop reason: SDUPTHER

## 2019-12-03 RX ORDER — NITROGLYCERIN 0.4 MG/1
0.4 TABLET SUBLINGUAL
Qty: 30 TABLET | Refills: 3 | Status: SHIPPED | OUTPATIENT
Start: 2019-12-03 | End: 2020-01-09 | Stop reason: SDUPTHER

## 2019-12-03 RX ORDER — ISOSORBIDE MONONITRATE 30 MG/1
30 TABLET, EXTENDED RELEASE ORAL DAILY
Qty: 90 TABLET | Refills: 3 | Status: SHIPPED | OUTPATIENT
Start: 2019-12-03 | End: 2019-12-29 | Stop reason: HOSPADM

## 2019-12-03 RX ORDER — CLOPIDOGREL BISULFATE 75 MG/1
75 TABLET ORAL DAILY
Qty: 90 TABLET | Refills: 3 | Status: SHIPPED | OUTPATIENT
Start: 2019-12-03 | End: 2020-01-09 | Stop reason: SDUPTHER

## 2019-12-03 NOTE — PROGRESS NOTES
Cardiology Follow Up  Riccardo Guerrero  1955  [id number]  [id number]    1  Coronary artery disease involving native coronary artery of native heart with unstable angina pectoris (HCC)  POCT ECG   2  Type 2 myocardial infarction without ST elevation (HCC)  POCT ECG    ranolazine (RANEXA) 500 mg 12 hr tablet   3  Coronary artery spasm (HCC)  isosorbide mononitrate (IMDUR) 30 mg 24 hr tablet    ranolazine (RANEXA) 500 mg 12 hr tablet   4  MI, acute, non ST segment elevation (HCC)  clopidogrel (PLAVIX) 75 mg tablet    atorvastatin (LIPITOR) 80 mg tablet    nitroglycerin (NITROSTAT) 0 4 mg SL tablet    ranolazine (RANEXA) 500 mg 12 hr tablet   5  Chronic stable angina (HCC)  ranolazine (RANEXA) 500 mg 12 hr tablet    metoprolol tartrate (LOPRESSOR) 25 mg tablet      Discussion/Plan:  Recent STEMI s/p CHARBEL *1 to 95% mid LAD stenosis + POBA of diagonal + recurrent chest pain  Repeat cath with patent stents/non-obstructive disease Normal EF He will continue aspirin lifelong plus clopidogrel at least for the next 1 year still August 2020  He will continue atorvastatin 80 mg daily  Continue metoprolol 12 5 mg twice a day  Continue cardiac rehab  Continue isosorbide mononitrate 30 mg daily  We will add Ranexa 500 mg twice a day    ED- understand not to use viagra with nitroglycerin    Bipolar disorder/schizoaffective disorder    Smoking- he will never quit  Refused    COPD-inhaler therapy  He would like to discussed with the pulmonologist about optimizing his inhaler therapy  He reports having significant shortness of breath  He has a long smoking history  Interval History:  37-year-old gentleman with a history of bipolar disorder, longstanding history of smoking with recent hospital admission with some acute LAD STEMI status post transfer to [facility] for PCI  Since discharge he denies having chest pain    He denies having shortness of breath  Denies having lower extremity edema  He denies having significant bleeding or bruising  He he is currently on a prescription government program and has refilled his medications  He right now declines stopping smoking  He is about to move to [place]  12/03/2019:  I reviewed through his recent angiogram with him  He is compliant with his medications he still has recurrent chest pain episodes  He is willing to do cardiac rehab  He still has shortness of breath significantly when walking  He is trying to get on inhaler therapy      Social Hx  Moving to [place]- getting thrown out    Patient Active Problem List   Diagnosis    Coronary artery disease involving native coronary artery of native heart with unstable angina pectoris ([place] [address] )    Bipolar 1 disorder ([place] [address] )    Tobacco user    Type 2 myocardial infarction without ST elevation ([place] [address] )    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[place] [address] )     Past Medical History:   Diagnosis Date    Bipolar 1 disorder ([place] [address] )     CAD (coronary artery disease)     Schizo-affective psychosis ([place] [address] )     Tobacco user      Social History     Socioeconomic History    Marital status:      Spouse name: Not on file    Number of children: 3    Years of education: Not on file    Highest education level: Associate degree: occupational, technical, or vocational program   Occupational History    Occupation: none     Employer: none    Occupation: Disabled   Social Needs    Financial resource strain: Very hard    Food insecurity:     Worry: Sometimes true     Inability: Sometimes true    Transportation needs:     Medical: Yes     Non-medical: Yes   Tobacco Use    Smoking status: Heavy Tobacco Smoker     Packs/day: 1 50     Types: Cigarettes    Smokeless tobacco: Never Used   Substance and Sexual Activity    Alcohol use: Not Currently     Frequency: Never    Drug use: Never    Sexual activity: Yes   Lifestyle    Physical activity:     Days per week: 0 days     Minutes per session: 0 min    Stress: Very much   Relationships    Social connections:     Talks on phone: Never     Gets together: Never     Attends Orthodox service: Never     Active member of club or organization: No     Attends meetings of clubs or organizations: Never     Relationship status:     Intimate partner violence:     Fear of current or ex partner: No     Emotionally abused: No     Physically abused: No     Forced sexual activity: No   Other Topics Concern    Not on file   Social History Narrative    Not on file      Family History   Problem Relation Age of Onset    Heart disease Mother     Heart disease Father     Multiple sclerosis Sister      Past Surgical History:   Procedure Laterality Date    BACK SURGERY      CARDIAC SURGERY      stent placement    CHOLECYSTECTOMY      KNEE SURGERY Right        Current Outpatient Medications:     aspirin 81 mg chewable tablet, Chew 1 tablet (81 mg total) daily, Disp: 90 tablet, Rfl: 3    atorvastatin (LIPITOR) 80 mg tablet, Take 1 tablet (80 mg total) by mouth every evening, Disp: 90 tablet, Rfl: 3    clopidogrel (PLAVIX) 75 mg tablet, Take 1 tablet (75 mg total) by mouth daily, Disp: 90 tablet, Rfl: 3    isosorbide mononitrate (IMDUR) 30 mg 24 hr tablet, Take 1 tablet (30 mg total) by mouth daily, Disp: 90 tablet, Rfl: 3    metoprolol tartrate (LOPRESSOR) 25 mg tablet, Take 1 tablet (25 mg total) by mouth every 12 (twelve) hours, Disp: 180 tablet, Rfl: 3    nitroglycerin (NITROSTAT) 0 4 mg SL tablet, Place 1 tablet (0 4 mg total) under the tongue every 5 (five) minutes as needed for chest pain, Disp: 30 tablet, Rfl: 3    sildenafil (VIAGRA) 100 mg tablet, Take 100 mg by mouth daily as needed for erectile dysfunction, Disp: , Rfl:     topiramate (TOPAMAX) 100 mg tablet, Take 100 mg by mouth 3 (three) times a day, Disp: , Rfl:     ziprasidone (GEODON) 40 mg capsule, Take 40 mg by mouth 2 (two) times a day, Disp: , Rfl:     ranolazine (RANEXA) 500 mg 12 hr tablet, Take 1 tablet (500 mg total) by mouth 2 (two) times a day, Disp: 180 tablet, Rfl: 3  Allergies   Allergen Reactions    Fish-Derived Products Hives    Rice Bean [Phaseolus] Hives       Review of Systems:  Review of Systems   Constitutional: Negative  HENT: Negative  Eyes: Negative  Respiratory: Positive for shortness of breath  Cardiovascular: Positive for chest pain  Gastrointestinal: Negative  Endocrine: Negative  Genitourinary: Negative  Musculoskeletal: Negative  Skin: Negative  Allergic/Immunologic: Negative  Neurological: Negative  Hematological: Negative  Psychiatric/Behavioral: Positive for behavioral problems, decreased concentration and dysphoric mood  Vitals:    12/03/19 1524   BP: 126/78   BP Location: Right arm   Patient Position: Sitting   Cuff Size: Standard   Pulse: 61   SpO2: 97%   Weight: 103 kg (227 lb)   Height: 5' 10" (1 778 m)     Physical Exam:  Physical Exam   Constitutional: He is oriented to person, place, and time  He appears well-developed and well-nourished  No distress  HENT:   Head: Normocephalic and atraumatic  Right Ear: External ear normal    Left Ear: External ear normal    Eyes: Pupils are equal, round, and reactive to light  Conjunctivae are normal  Right eye exhibits no discharge  Left eye exhibits no discharge  No scleral icterus  Neck: Normal range of motion  Neck supple  No JVD present  No tracheal deviation present  No thyromegaly present  Cardiovascular: Normal rate and regular rhythm  Exam reveals gallop  Exam reveals no friction rub  No murmur heard  Pulmonary/Chest: No stridor  Apnea noted  No respiratory distress  He has no wheezes  He has no rales  He exhibits no tenderness  Poor force of expiration   Abdominal: Soft  Bowel sounds are normal  He exhibits no distension and no mass  There is no tenderness  There is no rebound and no guarding     Musculoskeletal: Normal range of motion  He exhibits no edema, tenderness or deformity  Neurological: He is alert and oriented to person, place, and time  He has normal reflexes  He displays normal reflexes  No cranial nerve deficit  He exhibits normal muscle tone  Coordination normal    Skin: Skin is warm and dry  No rash noted  He is not diaphoretic  No erythema  No pallor  Psychiatric: He has a normal mood and affect  His behavior is normal  Judgment and thought content normal        Labs:     Lab Results   Component Value Date    WBC 9 59 2019    HGB 16 5 2019    HCT 52 5 (H) 2019     (H) 2019     2019     Lab Results   Component Value Date    K 4 6 2019     (H) 2019    CO2 17 (L) 2019    BUN 12 2019    CREATININE 0 92 2019    GLUF 94 2019    CALCIUM 9 0 2019    AST 25 2019    ALT 17 2019    ALKPHOS 69 2019    EGFR 89 2019     No results found for: CHOL  Lab Results   Component Value Date    HDL 41 2019    HDL 42 2019     Lab Results   Component Value Date    LDLCALC 90 2019    [address] 85 2019     Lab Results   Component Value Date    TRIG 71 2019    TRIG 75 2019     Lab Results   Component Value Date    HGBA1C 5 6 2019       Imaging & Testing   I have personally reviewed pertinent reports  EKG: Personally reviewed      Normal sinus rhythm deep t-wave inversion    Cardiac testing:   Results for orders placed during the hospital encounter of 19   Echo complete with contrast if indicated    Narrative [facility]  [facility], [address] [place] [address]  [phone]    Transthoracic Echocardiogram  2D, M-mode, Doppler, and Color Doppler    Study date:  14-Aug-2019    Patient: Casper Evans  MR number: [id number]  Account number: [de-identified]  : 1955  Age: 66 years  Gender: Male  Status: Inpatient  Location: Bedside  Height: 70 in  Weight: 232 5 lb  BP: 121/ 74 mmHg    Indications: Myocardial infarction  Diagnoses: I25 119 - Atherosclerotic heart disease of native coronary artery with unspecified angina pectoris    Sonographer:  Luis Puri RDCS  Referring Physician:  Barrett Miramontes MD  Group:  [facility]  Cardiology Fellow:  Josey Arriaga MD  Interpreting Physician:  Joey Chavez MD    SUMMARY    LEFT VENTRICLE:  Systolic function was normal  Ejection fraction was estimated to be 55 %  There was possible hypokinesis of the apical wall(s)  AORTIC VALVE:  There was mild regurgitation  HISTORY: PRIOR HISTORY: Heavy smoker, bipolar disorder, schizo-affective psychosis  PROCEDURE: The procedure was performed at the bedside  This was a routine study  The transthoracic approach was used  The study included complete 2D imaging, M-mode, complete spectral Doppler, and color Doppler  The heart rate was 48 bpm,  at the start of the study  Images were obtained from the parasternal, apical, subcostal, and suprasternal notch acoustic windows  Image quality was adequate  LEFT VENTRICLE: Size was normal  Systolic function was normal  Ejection fraction was estimated to be 55 %  There was possible hypokinesis of the apical wall(s)  Wall thickness was normal  DOPPLER: The ratio of early ventricular filling to  atrial contraction velocities was within the normal range  RIGHT VENTRICLE: The size was normal  Systolic function was normal     LEFT ATRIUM: The atrium was mildly dilated  RIGHT ATRIUM: Size was normal     MITRAL VALVE: Valve structure was normal  There was normal leaflet separation  DOPPLER: There was no evidence for stenosis  There was trace regurgitation  AORTIC VALVE: The valve was trileaflet  Leaflets exhibited mildly increased thickness and normal cuspal separation  DOPPLER: There was no evidence for stenosis  There was mild regurgitation      TRICUSPID VALVE: The valve structure was normal  There was normal leaflet separation  DOPPLER: There was no evidence for stenosis  There was trace regurgitation  PULMONIC VALVE: Leaflets exhibited normal thickness, no calcification, and normal cuspal separation  DOPPLER: The transpulmonic velocity was within the normal range  There was no significant regurgitation  PERICARDIUM: There was no pericardial effusion  The pericardium was normal in appearance  AORTA: The root exhibited normal size  SYSTEMIC VEINS: IVC: The inferior vena cava was normal in size  SYSTEM MEASUREMENT TABLES    2D  %FS: 26 16 %  Ao Diam: 3 53 cm  EDV(Teich): 190 01 ml  EF(Teich): 50 41 %  ESV(Teich): 94 22 ml  IVSd: 0 79 cm  LA Area: 21 81 cm2  LA Diam: 4 22 cm  LVEDV MOD A4C: 153 33 ml  LVEF MOD A4C: 51 31 %  LVESV MOD A4C: 74 66 ml  LVIDd: 6 14 cm  LVIDs: 4 54 cm  LVLd A4C: 8 84 cm  LVLs A4C: 7 94 cm  LVPWd: 0 83 cm  RA Area: 17 23 cm2  RVIDd: 3 cm  SV MOD A4C: 78 67 ml  SV(Teich): 95 78 ml    MM  TAPSE: 2 2 cm    PW  E': 0 06 m/s  E/E': 13 36  MV A Moises: 0 68 m/s  MV Dec Mason: 3 38 m/s2  MV DecT: 245 06 ms  MV E Moises: 0 83 m/s  MV E/A Ratio: 1 21  MV PHT: 71 07 ms  MVA By PHT: 3 1 cm2    [facility] Accredited Echocardiography Laboratory    Prepared and electronically signed by    Froylan Carrillo MD  Signed 15-Aug-2019 08:55:39       Left main: Normal   LAD: The vessel was normal sized  There was nonobstructive plaque  There were no significant lesions  The site of prior stent placement in 8/19 remains widely patent  The jailed D2 branch is also free of obstructive disease  Circumflex: The vessel was normal sized and gave rise to two OM and three posterolateral branches  No discrete lesions were seen  RCA: The vessel was normal sized and dominant  There was minor nonobstructive plaque          Amy Easley  Please call with any questions or suggestions    A description of the counseling:   Goals and Barriers:  Patient's ability to self care:  Medication side effect reviewed with patient in detail and all their questions answered  "This note has been constructed using a voice recognition system  Therefore there may be syntax, spelling, and/or grammatical errors   Please call if you have any questions  "

## 2019-12-03 NOTE — PROGRESS NOTES
Patient states he is struggling  He has housing assistance through [employer], but does not qualify for food stamps  He states he was working with , but his contact was "arrogant"  He states he needs food today  Offered to provide a list of food epps and to give him some places he could go for a hot meal today  He requests the food epps be mailed to him & now states he could get food today if he wanted to  He has started getting hospital bills that he can't pay  He did just speak to a medicare counselor who helped him to add a [facility] advantage plan for next year  He just started going to [facility] and is very happy with their care  Prior to that he went to [facility]  He verbalizes severe depression, stress, and suicidal ideations  He declines crisis intervention as he doesn't feel it is necessary, but will call [facility] to speak to the therapist as needed  Discussed with outpatient   who states this is an appropriate action plan  Patient states he does not have any family or friends near by  He is new to the area  Prior to moving here he was homeless and before that he lived in [place]  He has a girlfriend who [place] in [place]  He would be interested in relocating closer to her, but states he cannot get the financial assistance to do so  Patient is also in need of furniture  He indicates he has the resources to buy secondhand, but needs someone to help him move it into his apartment  He c/o severe pain from his lower back down his hips and his right knee  He doesn't want to see spine & pain or ortho stating he doesn't want more doctors poking & prodding him  He reports 2 arthroscopic knee procedures within the last year & that he was told by ortho that he's too young for a TKR  He states PT wasn't helpful, he doesn't want to take pain meds, ice & heat don't help, he just "lives with it"       He states multiple falls without injury stating his legs were weak & giving out  Now he just changes position slowly  He denies lightheadedness or dizziness, again feels PT would not be helpful, & declines a r/w or cane to assist with stability  Reviewed COPD triggers & red flags  He continues to smoke 1 5 packs/day & is not interested in quitting  Finances does not motivate him as he states he buys "the cheap ones"  Patient is aware of cardio appointment this afternoon  He states he will call this week to make an appointment for a new PCP  He will also need a new eye doctor   He is agreeable to a community health worker referral  Will also mail 5 Wishes per his request

## 2019-12-03 NOTE — TELEPHONE ENCOUNTER
Kevin Tee  He has an appointment at [facility] on the 9th  Should we refer him to Cassia Valles? Do you want to keep him for now? What can I assist with?   Thanks!!!!

## 2019-12-03 NOTE — TELEPHONE ENCOUNTER
Pt is starting over financially from scratch, multiple health issues - he is a transfer and will be seen on Monday at 11am   He is requesting to meet with our care mgmt/SW at this time or receive a phone call if this time is not convenient to talk after he establishes care  He currently has spoken to Allison a few times  His most immediate need is a bed, he is sleeping on the floor

## 2019-12-06 ENCOUNTER — PATIENT OUTREACH (OUTPATIENT)
Dept: FAMILY MEDICINE CLINIC | Facility: CLINIC | Age: 64
End: 2019-12-06

## 2019-12-06 ENCOUNTER — PATIENT OUTREACH (OUTPATIENT)
Dept: CASE MANAGEMENT | Facility: OTHER | Age: 64
End: 2019-12-06

## 2019-12-06 ENCOUNTER — OFFICE VISIT (OUTPATIENT)
Dept: FAMILY MEDICINE CLINIC | Facility: CLINIC | Age: 64
End: 2019-12-06
Payer: MEDICARE

## 2019-12-06 VITALS
TEMPERATURE: 97.8 F | HEIGHT: 69 IN | DIASTOLIC BLOOD PRESSURE: 76 MMHG | HEART RATE: 67 BPM | OXYGEN SATURATION: 99 % | BODY MASS INDEX: 34.47 KG/M2 | WEIGHT: 232.7 LBS | SYSTOLIC BLOOD PRESSURE: 138 MMHG

## 2019-12-06 DIAGNOSIS — F31.9 BIPOLAR 1 DISORDER (HCC): ICD-10-CM

## 2019-12-06 DIAGNOSIS — G89.29 CHRONIC PAIN OF RIGHT KNEE: Primary | ICD-10-CM

## 2019-12-06 DIAGNOSIS — Z72.0 TOBACCO USER: ICD-10-CM

## 2019-12-06 DIAGNOSIS — I25.110 CORONARY ARTERY DISEASE INVOLVING NATIVE CORONARY ARTERY OF NATIVE HEART WITH UNSTABLE ANGINA PECTORIS (HCC): ICD-10-CM

## 2019-12-06 DIAGNOSIS — M25.561 CHRONIC PAIN OF RIGHT KNEE: Primary | ICD-10-CM

## 2019-12-06 PROCEDURE — 99213 OFFICE O/P EST LOW 20 MIN: CPT | Performed by: FAMILY MEDICINE

## 2019-12-06 NOTE — PROGRESS NOTES
Met with the patient at his appointment to establish care at [facility] with Dr Reagan Rubi and Dr Dorene Taylor, Christopher Romano, also in attendance  Re-introduced myself to the patient & clarified that the community health worker will be calling him to schedule an appointment to meet & identify resources or services that may benefit him  He states understanding  He also asked to speak to a   Dr Reagan Rubi placed the referral & Fabian Dillon arranged for Karol Silva, to meet with him while he was there

## 2019-12-06 NOTE — PROGRESS NOTES
Assessment/Plan:     Diagnoses and all orders for this visit:    Chronic pain of right knee  -     Ambulatory referral to social work care management program; Future  -     Ambulatory referral to Orthopedic Surgery; Future  Continue current over the counter pain medication  Ibuprofen 400mg as needed every 6 hours  Recommend an evaluation by Orthopedic surgery for management of pain and possible arthritis     Tobacco user  -     Ambulatory referral to social work care management program; Future  Patient not interested in quitting at this time  Provided encouragement to patient that when he is ready to quit we have available resources for him     Coronary artery disease involving native coronary artery of native heart with unstable angina pectoris ([address] )  -     Ambulatory referral to social work care management program; Future  Patient following with Dr Ashlie Lee as his Cardiologist  Will continue to follow and management as needed     Bipolar 1 disorder [facility])  -     Ambulatory referral to social work care management program; Future  Continue care at [facility]  Patient will continue with therapy there, as well as get all necessary prescriptions form that office  RTO in 1 month for follow up         Subjective:      Patient ID: Samuel Melton is a 60 y o  male  59 y/o male presents today to establish care in our office  Patient was formerly seen by a Primary care physician in [facility]  Patient has a medical history of schizophrenia with bipolar and has been following with a Psychiatrist at [facility] care weekly and were he gets his psychiatric medication  Patient reports significant social hardships that he had to go through for which he is closely working on with Case Management  Patient has had several surgeries in the past including knee replacement and cholecystectomy  Patient reports pain in his left knee which has been present for several months   The pain is worse with movement, it is non radiating, sharp, feels like bones rubbing against each other  Additionally, patient reports pain in his back, located between the shoulder blades, which started a few months ago and has been around 8/10 in intensity  He has tried warm showers but they have not provided much relief  Patient currently denies fevers, chills, chest pain or shortness of breath  The following portions of the patient's history were reviewed and updated as appropriate: allergies, current medications, past family history, past medical history, past social history, past surgical history and problem list     Review of Systems   Constitutional: Negative for chills, fatigue and fever  HENT: Negative  Respiratory: Negative  Cardiovascular: Negative  Gastrointestinal: Negative for abdominal pain, constipation, diarrhea, nausea and vomiting  Genitourinary: Negative  Musculoskeletal: Positive for arthralgias, back pain, gait problem and myalgias  Skin: Negative for color change and rash  Neurological: Positive for headaches  Objective:      /76   Pulse 67   Temp 97 8 °F (36 6 °C)   Ht 5' 9" (1 753 m)   Wt 106 kg (232 lb 11 2 oz)   SpO2 99%   BMI 34 36 kg/m²          Physical Exam   Constitutional: He is oriented to person, place, and time  Vital signs are normal  He appears well-developed  No distress  Cardiovascular: Normal rate, regular rhythm and normal heart sounds  Pulmonary/Chest: Effort normal and breath sounds normal  No respiratory distress  He has no wheezes  Musculoskeletal:        Right knee: He exhibits decreased range of motion and bony tenderness  He exhibits no effusion and no ecchymosis  Tenderness found  Medial joint line tenderness noted  Crepitation of right knee  Left knee normal    Neurological: He is alert and oriented to person, place, and time  Skin: Capillary refill takes less than 2 seconds  He is not diaphoretic     Psychiatric: His behavior is normal    Nursing note and vitals reviewed

## 2019-12-06 NOTE — PROGRESS NOTES
OP CM met with pt in office  Pt states he is new to this practice  Pt is followed by Tracy Davalos and pt is also workign with Liza at [facility]  Pt will have [place] Medicare plan starting Jan 1  Pt states he resides in senior housing and pays $600 a month  Pt gets $2100 in social security disability  Pt states he had an air mattress but it burst and he is interested in assistance with furniture  Pt was already referred to [facility] and she will be meeting with pt  OP CM to remain available

## 2019-12-09 ENCOUNTER — PATIENT OUTREACH (OUTPATIENT)
Dept: CASE MANAGEMENT | Facility: OTHER | Age: 64
End: 2019-12-09

## 2019-12-09 NOTE — PROGRESS NOTES
Went for home visit, patient doing ok  CHW discussed what needs patient has  Patient states he is financially strapped and needs food at times  CHW assisted in calling office on aging to start [facility] referral and referral to visiting homemakers to assist with setting up apartment  CHW will get patient listing of local food epps and transportation schedule  CHW will also contact [facility] for any free furniture and cleaning supplies for patient   contacted CHW during home visit and we were able to document patients upcoming appointments made by case manger  Patient was very relieved  CHW also contacted legal services of [facility] and they will call patient back to set up an appointment for financial assistance  Patient currently has vehicle but stated it will be taken away soon since insurance dropped him and he has violations on licence  Patient does have Medicare and [employer] but has opted for a [employer] Medicare advantage plan for January 2020, this will cover all medical and prescriptions at lower costs to patient  CHW looked into food stamps and Medicaid but patient makes over the limit and is not qualified  Patient has all medications and is taking properly as prescribed   No further needs at this appointment, CHW will continue to assist

## 2019-12-09 NOTE — PROGRESS NOTES
Patient called to request a HHA for assistance in setting his apt up  Advised that he is not homebound & would not qualify & HHA assist with personal care  Recommended he call the [facility] for the [employer] to see if they have a volunteer program that could assist him  Also recommended searching online & asking the community health worker for guidance as she is scheduled to meet him this afternoon  Patient requests my assistance in making him ortho, pulmonary, & cardiac rehab appointments  (Appointments made)    He states his ex-wife left him with a lot of debt that he's trying to pay off  He feels a $5,000 loan would help, but he cannot pay the money back  Recommended a financial counselor  Will send a list from the [facility] department of banking & insurance  Made the community health worker aware of the above

## 2019-12-11 ENCOUNTER — OFFICE VISIT (OUTPATIENT)
Dept: OBGYN CLINIC | Facility: CLINIC | Age: 64
End: 2019-12-11
Payer: MEDICARE

## 2019-12-11 ENCOUNTER — APPOINTMENT (OUTPATIENT)
Dept: RADIOLOGY | Facility: CLINIC | Age: 64
End: 2019-12-11
Payer: MEDICARE

## 2019-12-11 VITALS
HEART RATE: 96 BPM | WEIGHT: 235.2 LBS | DIASTOLIC BLOOD PRESSURE: 76 MMHG | HEIGHT: 70 IN | SYSTOLIC BLOOD PRESSURE: 133 MMHG | BODY MASS INDEX: 33.67 KG/M2

## 2019-12-11 DIAGNOSIS — M25.561 RIGHT KNEE PAIN, UNSPECIFIED CHRONICITY: ICD-10-CM

## 2019-12-11 DIAGNOSIS — M17.11 PRIMARY OSTEOARTHRITIS OF RIGHT KNEE: Primary | ICD-10-CM

## 2019-12-11 PROCEDURE — 73562 X-RAY EXAM OF KNEE 3: CPT

## 2019-12-11 PROCEDURE — 99203 OFFICE O/P NEW LOW 30 MIN: CPT | Performed by: ORTHOPAEDIC SURGERY

## 2019-12-11 NOTE — LETTER
December 11, 2019     Luis Carlos Brown, [address] [address]    Patient: Margareth Guerrero   YOB: 1955   Date of Visit: 12/11/2019       Dear Dr Emma Maguire:    Thank you for referring James Apple to me for evaluation  Below are the relevant portions of my assessment and plan of care  Assessment/Plan:  1  Primary osteoarthritis of right knee  Ambulatory referral to Orthopedic Surgery   2  Right knee pain, unspecified chronicity  XR knee 3 vw right non injury       Scribe Attestation    I,:   Geovanny Ordonez am acting as a scribe while in the presence of the attending physician :        I,:   Pacheco Vasquez MD personally performed the services described in this documentation    as scribed in my presence :              Aicha Hewitt upon examination and review of the x-rays of his right knee does demonstrate severe osteoarthritis into the medial compartment as well as into the patellofemoral compartment  He does demonstrate a 2+ effusion, and is restricted in his range of motion, particularly into flexion  He is stable upon stress testing of the cruciate and collateral ligaments with typical laxity to valgus stress with an appropriate end feel  I do believe that he would be candidate for total knee arthroplasty given the severity of his arthritic change in his knee  With this in mind I would like to refer him to my total joint replacement colleague Dr Ginger Jain for a consultation  In the did verbalize understanding to all the information provided to him today and had no further questions  My office will help facilitate his appointment with Dr Ginger Jain  I will see Aicha Strausshailey back on an as-needed basis  Subjective: Margareth Guerrero is a 60 y o  male who presents to the office today for initial evaluation of his right knee  He is referred to us today by his primary care physician    Interim reports that he has had activity related pain for many years into his right knee and notes that he did receive a clean out approximately 1 5 years ago at [facility]  He states that this did not him relief and reports constant moderate to severe sharp pain about the anterior aspect of his knee  He does also appreciate moderate to severe swelling to his knee that he remarks has been present for many years  He notes that he is able to weightbear however notes that this does exacerbate his painful symptoms  He notes his pain is better while at rest   However will have start-up pain and significant stiffness if he is seated for long period of time  He denies being diabetic  Today he denies any distal paresthesias  If you have questions, please do not hesitate to call me  I look forward to following Edmund Biggs along with you           Sincerely,        Cathy Borrego MD        CC: No Recipients

## 2019-12-11 NOTE — PROGRESS NOTES
Assessment/Plan:  1  Primary osteoarthritis of right knee  Ambulatory referral to Orthopedic Surgery   2  Right knee pain, unspecified chronicity  XR knee 3 vw right non injury       Scribe Attestation    I,:   Nilson Taylor am acting as a scribe while in the presence of the attending physician :        I,:   Sonia Ritchie MD personally performed the services described in this documentation    as scribed in my presence :              Aurelia Paulino upon examination and review of the x-rays of his right knee does demonstrate severe osteoarthritis into the medial compartment as well as into the patellofemoral compartment  He does demonstrate a 2+ effusion, and is restricted in his range of motion, particularly into flexion  He is stable upon stress testing of the cruciate and collateral ligaments with typical laxity to valgus stress with an appropriate end feel  I do believe that he would be candidate for total knee arthroplasty given the severity of his arthritic change in his knee  With this in mind I would like to refer him to my total joint replacement colleague Dr Patricia Sweeney for a consultation  In the did verbalize understanding to all the information provided to him today and had no further questions  My office will help facilitate his appointment with Dr Patricia Sweeney  I will see Aurelia Bob back on an as-needed basis  Subjective: Freya Guerrero is a 60 y o  male who presents to the office today for initial evaluation of his right knee  He is referred to us today by his primary care physician  Interim reports that he has had activity related pain for many years into his right knee and notes that he did receive a clean out approximately 1 5 years ago at [facility]  He states that this did not him relief and reports constant moderate to severe sharp pain about the anterior aspect of his knee    He does also appreciate moderate to severe swelling to his knee that he remarks has been present for many years  He notes that he is able to weightbear however notes that this does exacerbate his painful symptoms  He notes his pain is better while at rest   However will have start-up pain and significant stiffness if he is seated for long period of time  He denies being diabetic  Today he denies any distal paresthesias  Review of Systems   Constitutional: Positive for unexpected weight change (Weight loss)  Negative for chills and fever  HENT: Negative for hearing loss, nosebleeds and sore throat  Eyes: Negative for pain, redness and visual disturbance  Respiratory: Positive for cough and shortness of breath  Negative for wheezing  Cardiovascular: Negative for chest pain, palpitations and leg swelling  Gastrointestinal: Negative for abdominal pain, nausea and vomiting  Endocrine: Negative for polyphagia and polyuria  Genitourinary: Negative for dysuria and hematuria  Musculoskeletal:        See HPI   Skin: Negative for rash and wound  Neurological: Negative for dizziness, numbness and headaches  Psychiatric/Behavioral: Negative for decreased concentration and suicidal ideas  The patient is not nervous/anxious            Past Medical History:   Diagnosis Date    Bipolar 1 disorder ([facility] )     CAD (coronary artery disease)     Schizo-affective psychosis ([facility] )     Tobacco user        Past Surgical History:   Procedure Laterality Date    BACK SURGERY      CARDIAC SURGERY      stent placement    CHOLECYSTECTOMY      KNEE SURGERY Right        Family History   Problem Relation Age of Onset    Heart disease Mother     Cancer Mother     Heart disease Father     Cancer Father     Multiple sclerosis Sister    Devaughn Viveros Cancer Sister        Social History     Occupational History    Occupation: none     Employer: none    Occupation: Disabled   Tobacco Use    Smoking status: Heavy Tobacco Smoker     Packs/day: 1 50     Types: Cigarettes    Smokeless tobacco: Never Used Substance and Sexual Activity    Alcohol use: Not Currently     Frequency: Never    Drug use: Never    Sexual activity: Yes     Partners: Female         Current Outpatient Medications:     aspirin 81 mg chewable tablet, Chew 1 tablet (81 mg total) daily, Disp: 90 tablet, Rfl: 3    atorvastatin (LIPITOR) 80 mg tablet, Take 1 tablet (80 mg total) by mouth every evening, Disp: 90 tablet, Rfl: 3    clopidogrel (PLAVIX) 75 mg tablet, Take 1 tablet (75 mg total) by mouth daily, Disp: 90 tablet, Rfl: 3    isosorbide mononitrate (IMDUR) 30 mg 24 hr tablet, Take 1 tablet (30 mg total) by mouth daily, Disp: 90 tablet, Rfl: 3    metoprolol tartrate (LOPRESSOR) 25 mg tablet, Take 1 tablet (25 mg total) by mouth every 12 (twelve) hours, Disp: 180 tablet, Rfl: 3    nitroglycerin (NITROSTAT) 0 4 mg SL tablet, Place 1 tablet (0 4 mg total) under the tongue every 5 (five) minutes as needed for chest pain, Disp: 30 tablet, Rfl: 3    ranolazine (RANEXA) 500 mg 12 hr tablet, Take 1 tablet (500 mg total) by mouth 2 (two) times a day, Disp: 180 tablet, Rfl: 3    topiramate (TOPAMAX) 100 mg tablet, Take 100 mg by mouth 3 (three) times a day, Disp: , Rfl:     ziprasidone (GEODON) 40 mg capsule, Take 40 mg by mouth 2 (two) times a day, Disp: , Rfl:     Allergies   Allergen Reactions    Fish-Derived Products Hives    Rice Bean [Phaseolus] Hives       Objective:  Vitals:    12/11/19 0834   BP: 133/76   Pulse: 96       Right Knee Exam     Tenderness   The patient is experiencing tenderness in the medial joint line  Range of Motion   Extension: 0   Flexion: 90     Tests   Varus: negative Valgus: positive  Drawer:  Anterior - negative    Posterior - negative    Other   Erythema: absent  Scars: present  Sensation: normal  Pulse: present  Effusion: effusion present    Comments:  Anterior surgical portals from previous arthroscopy are well-healed          Observations     Right Knee   Positive for effusion         Physical Exam Constitutional: He is oriented to person, place, and time  He appears well-developed and well-nourished  HENT:   Head: Normocephalic and atraumatic  Eyes: Conjunctivae are normal  Right eye exhibits no discharge  Left eye exhibits no discharge  Neck: Normal range of motion  Neck supple  Cardiovascular: Normal rate and intact distal pulses  Pulmonary/Chest: Effort normal  No respiratory distress  Musculoskeletal:        Right knee: He exhibits effusion  As noted in HPI   Neurological: He is alert and oriented to person, place, and time  Skin: Skin is warm and dry  Psychiatric: He has a normal mood and affect  His behavior is normal  Judgment and thought content normal    Vitals reviewed  I have personally reviewed pertinent films in PACS and my interpretation is as follows:    X-rays of the right knee demonstrate severe tricompartmental osteoarthritis most severe into medial compartment and patellofemoral compartment with osteophytes at the medial femoral condyle and medial tibial plateaus

## 2019-12-19 ENCOUNTER — PATIENT OUTREACH (OUTPATIENT)
Dept: CASE MANAGEMENT | Facility: OTHER | Age: 64
End: 2019-12-19

## 2019-12-20 ENCOUNTER — PATIENT OUTREACH (OUTPATIENT)
Dept: CASE MANAGEMENT | Facility: OTHER | Age: 64
End: 2019-12-20

## 2019-12-20 NOTE — PROGRESS NOTES
Patient states he is working to improve his diet, but is limited by what is available from the food bank  He has cut back on fast food & states he is able to cook his own meals  Reviewed medications & side effects  Patient states in the past week he had to take Nitro x 1 with result  He denies chest pain, SOB, lightheadedness, dizziness, n/v  He states financial needs are causing him stress, but he is able to "calm [himself] down"  (He is working with [employer] to apply for additional assistance)  He is looking forward to meeting with ortho to develop a care plan for his OA  He states he cancelled cardiac rehab because he can't afford the $25 co-pay each time

## 2019-12-20 NOTE — PROGRESS NOTES
Went for home visit, patient doing well  Patient states he has all medications and is taking properly  Patient has food and is able to shop and go to food epps provided by [employer]  CHW provided listing of eye doctors accepting Medicare and patient will choose and we will schedule appointment  CHW also gave list of needed documents to complete leslie care application and will assist at next visit with application  CHW advised patient she has someone with some furniture and they are will to donate to patient and they will contact patient, CHW also assisted patient in using several web pages to look for free furniture and patient is going to do searching  CHW and patient reached out to [place] at office on aging for [facility] referral, patient was accepted and will be contacted by  for out patient visit  CHW and patient also contacted legal services and asked for advise on traffic violations, they explained for patient to reach out to state legal service  Lastly, CHW and patient contacted Sherlyn at [employer] homem[employer] and put a referral in for volunteer assistance to help patient to clean/set up apartment, Stormy Alpers will see if any volunteers are available and will contact patent directly  No further needs at this visit, CHW will continue to assist and monitor for needs

## 2019-12-20 NOTE — PROGRESS NOTES
Spoke to NANCY & she will email  [employer] [facility] billing to see if [facility] care would cover the patient's cardiac rehab co-pay

## 2019-12-24 ENCOUNTER — PATIENT OUTREACH (OUTPATIENT)
Dept: CASE MANAGEMENT | Facility: OTHER | Age: 64
End: 2019-12-24

## 2019-12-27 ENCOUNTER — APPOINTMENT (INPATIENT)
Dept: RADIOLOGY | Facility: HOSPITAL | Age: 64
DRG: 246 | End: 2019-12-27
Payer: MEDICARE

## 2019-12-27 ENCOUNTER — APPOINTMENT (INPATIENT)
Dept: NON INVASIVE DIAGNOSTICS | Facility: HOSPITAL | Age: 64
DRG: 246 | End: 2019-12-27
Payer: MEDICARE

## 2019-12-27 ENCOUNTER — HOSPITAL ENCOUNTER (INPATIENT)
Facility: HOSPITAL | Age: 64
LOS: 2 days | Discharge: HOME | DRG: 246 | End: 2019-12-29
Attending: EMERGENCY MEDICINE | Admitting: INTERNAL MEDICINE
Payer: MEDICARE

## 2019-12-27 DIAGNOSIS — Z86.79 HISTORY OF CORONARY ARTERY DISEASE: ICD-10-CM

## 2019-12-27 DIAGNOSIS — R07.9 CHEST PAIN: ICD-10-CM

## 2019-12-27 DIAGNOSIS — I21.02 ST ELEVATION MYOCARDIAL INFARCTION INVOLVING LEFT ANTERIOR DESCENDING (LAD) CORONARY ARTERY (HCC): ICD-10-CM

## 2019-12-27 DIAGNOSIS — Z95.5 S/P CORONARY ARTERY STENT PLACEMENT: ICD-10-CM

## 2019-12-27 DIAGNOSIS — I25.110 CORONARY ARTERY DISEASE INVOLVING NATIVE CORONARY ARTERY OF NATIVE HEART WITH UNSTABLE ANGINA PECTORIS (HCC): ICD-10-CM

## 2019-12-27 DIAGNOSIS — R11.0 NAUSEA: ICD-10-CM

## 2019-12-27 DIAGNOSIS — F31.9 BIPOLAR 1 DISORDER (HCC): ICD-10-CM

## 2019-12-27 DIAGNOSIS — I21.3 STEMI (ST ELEVATION MYOCARDIAL INFARCTION) (HCC): Primary | ICD-10-CM

## 2019-12-27 PROBLEM — J44.9 COPD (CHRONIC OBSTRUCTIVE PULMONARY DISEASE) (HCC): Status: ACTIVE | Noted: 2019-12-27

## 2019-12-27 LAB
ALBUMIN SERPL BCP-MCNC: 3.3 G/DL (ref 3.5–5)
ALP SERPL-CCNC: 71 U/L (ref 46–116)
ALT SERPL W P-5'-P-CCNC: 22 U/L (ref 12–78)
ANION GAP BLD CALC-SCNC: 13 MMOL/L (ref 4–13)
ANION GAP SERPL CALCULATED.3IONS-SCNC: 4 MMOL/L (ref 4–13)
ANION GAP SERPL CALCULATED.3IONS-SCNC: 6 MMOL/L (ref 4–13)
ANION GAP SERPL CALCULATED.3IONS-SCNC: 6 MMOL/L (ref 4–13)
APTT PPP: 26 SECONDS (ref 23–37)
AST SERPL W P-5'-P-CCNC: 14 U/L (ref 5–45)
ATRIAL RATE: 52 BPM
ATRIAL RATE: 62 BPM
ATRIAL RATE: 62 BPM
ATRIAL RATE: 75 BPM
BASE EXCESS BLDA CALC-SCNC: 3 MMOL/L (ref -2–3)
BASOPHILS # BLD AUTO: 0.05 THOUSANDS/ΜL (ref 0–0.1)
BASOPHILS # BLD AUTO: 0.06 THOUSANDS/ΜL (ref 0–0.1)
BASOPHILS NFR BLD AUTO: 0 % (ref 0–1)
BASOPHILS NFR BLD AUTO: 1 % (ref 0–1)
BILIRUB SERPL-MCNC: 1.07 MG/DL (ref 0.2–1)
BUN BLD-MCNC: 12 MG/DL (ref 5–25)
BUN SERPL-MCNC: 10 MG/DL (ref 5–25)
BUN SERPL-MCNC: 11 MG/DL (ref 5–25)
BUN SERPL-MCNC: 9 MG/DL (ref 5–25)
CA-I BLD-SCNC: 1 MMOL/L (ref 1.12–1.32)
CA-I BLD-SCNC: 1.03 MMOL/L (ref 1.12–1.32)
CA-I BLD-SCNC: 1.07 MMOL/L (ref 1.12–1.32)
CALCIUM SERPL-MCNC: 8.2 MG/DL (ref 8.3–10.1)
CALCIUM SERPL-MCNC: 8.7 MG/DL (ref 8.3–10.1)
CALCIUM SERPL-MCNC: 8.9 MG/DL (ref 8.3–10.1)
CHLORIDE BLD-SCNC: 103 MMOL/L (ref 100–108)
CHLORIDE SERPL-SCNC: 107 MMOL/L (ref 100–108)
CHLORIDE SERPL-SCNC: 108 MMOL/L (ref 100–108)
CHLORIDE SERPL-SCNC: 108 MMOL/L (ref 100–108)
CHOLEST SERPL-MCNC: 158 MG/DL (ref 50–200)
CO2 SERPL-SCNC: 25 MMOL/L (ref 21–32)
CO2 SERPL-SCNC: 25 MMOL/L (ref 21–32)
CO2 SERPL-SCNC: 26 MMOL/L (ref 21–32)
CREAT BLD-MCNC: 0.8 MG/DL (ref 0.6–1.3)
CREAT SERPL-MCNC: 0.88 MG/DL (ref 0.6–1.3)
CREAT SERPL-MCNC: 0.97 MG/DL (ref 0.6–1.3)
CREAT SERPL-MCNC: 0.99 MG/DL (ref 0.6–1.3)
EOSINOPHIL # BLD AUTO: 0.07 THOUSAND/ΜL (ref 0–0.61)
EOSINOPHIL # BLD AUTO: 0.11 THOUSAND/ΜL (ref 0–0.61)
EOSINOPHIL NFR BLD AUTO: 1 % (ref 0–6)
EOSINOPHIL NFR BLD AUTO: 1 % (ref 0–6)
ERYTHROCYTE [DISTWIDTH] IN BLOOD BY AUTOMATED COUNT: 14.4 % (ref 11.6–15.1)
ERYTHROCYTE [DISTWIDTH] IN BLOOD BY AUTOMATED COUNT: 14.4 % (ref 11.6–15.1)
GFR SERPL CREATININE-BSD FRML MDRD: 81 ML/MIN/1.73SQ M
GFR SERPL CREATININE-BSD FRML MDRD: 83 ML/MIN/1.73SQ M
GFR SERPL CREATININE-BSD FRML MDRD: 92 ML/MIN/1.73SQ M
GFR SERPL CREATININE-BSD FRML MDRD: 96 ML/MIN/1.73SQ M
GLUCOSE SERPL-MCNC: 111 MG/DL (ref 65–140)
GLUCOSE SERPL-MCNC: 118 MG/DL (ref 65–140)
GLUCOSE SERPL-MCNC: 142 MG/DL (ref 65–140)
GLUCOSE SERPL-MCNC: 145 MG/DL (ref 65–140)
GLUCOSE SERPL-MCNC: 145 MG/DL (ref 65–140)
HCO3 BLDA-SCNC: 26.4 MMOL/L (ref 24–30)
HCT VFR BLD AUTO: 47.9 % (ref 36.5–49.3)
HCT VFR BLD AUTO: 50.7 % (ref 36.5–49.3)
HCT VFR BLD CALC: 51 % (ref 36.5–49.3)
HCT VFR BLD CALC: 52 % (ref 36.5–49.3)
HDLC SERPL-MCNC: 45 MG/DL
HGB BLD-MCNC: 15.9 G/DL (ref 12–17)
HGB BLD-MCNC: 17 G/DL (ref 12–17)
HGB BLDA-MCNC: 17.3 G/DL (ref 12–17)
HGB BLDA-MCNC: 17.7 G/DL (ref 12–17)
IMM GRANULOCYTES # BLD AUTO: 0.03 THOUSAND/UL (ref 0–0.2)
IMM GRANULOCYTES # BLD AUTO: 0.04 THOUSAND/UL (ref 0–0.2)
IMM GRANULOCYTES NFR BLD AUTO: 0 % (ref 0–2)
IMM GRANULOCYTES NFR BLD AUTO: 0 % (ref 0–2)
INR PPP: 1.11 (ref 0.84–1.19)
KCT BLD-ACNC: 218 SEC (ref 89–137)
LDLC SERPL CALC-MCNC: 97 MG/DL (ref 0–100)
LYMPHOCYTES # BLD AUTO: 1.87 THOUSANDS/ΜL (ref 0.6–4.47)
LYMPHOCYTES # BLD AUTO: 1.99 THOUSANDS/ΜL (ref 0.6–4.47)
LYMPHOCYTES NFR BLD AUTO: 15 % (ref 14–44)
LYMPHOCYTES NFR BLD AUTO: 17 % (ref 14–44)
MAGNESIUM SERPL-MCNC: 2 MG/DL (ref 1.6–2.6)
MAGNESIUM SERPL-MCNC: 2.1 MG/DL (ref 1.6–2.6)
MAGNESIUM SERPL-MCNC: 2.1 MG/DL (ref 1.6–2.6)
MCH RBC QN AUTO: 32.6 PG (ref 26.8–34.3)
MCH RBC QN AUTO: 33.2 PG (ref 26.8–34.3)
MCHC RBC AUTO-ENTMCNC: 33.2 G/DL (ref 31.4–37.4)
MCHC RBC AUTO-ENTMCNC: 33.5 G/DL (ref 31.4–37.4)
MCV RBC AUTO: 100 FL (ref 82–98)
MCV RBC AUTO: 97 FL (ref 82–98)
MONOCYTES # BLD AUTO: 0.68 THOUSAND/ΜL (ref 0.17–1.22)
MONOCYTES # BLD AUTO: 0.75 THOUSAND/ΜL (ref 0.17–1.22)
MONOCYTES NFR BLD AUTO: 5 % (ref 4–12)
MONOCYTES NFR BLD AUTO: 6 % (ref 4–12)
NEUTROPHILS # BLD AUTO: 8.98 THOUSANDS/ΜL (ref 1.85–7.62)
NEUTROPHILS # BLD AUTO: 9.99 THOUSANDS/ΜL (ref 1.85–7.62)
NEUTS SEG NFR BLD AUTO: 76 % (ref 43–75)
NEUTS SEG NFR BLD AUTO: 78 % (ref 43–75)
NONHDLC SERPL-MCNC: 113 MG/DL
NRBC BLD AUTO-RTO: 0 /100 WBCS
NRBC BLD AUTO-RTO: 0 /100 WBCS
P AXIS: 55 DEGREES
P AXIS: 64 DEGREES
P AXIS: 68 DEGREES
P AXIS: 68 DEGREES
PCO2 BLD: 27 MMOL/L (ref 21–32)
PCO2 BLD: 28 MMOL/L (ref 21–32)
PCO2 BLD: 36.2 MM HG (ref 42–50)
PH BLD: 7.47 [PH] (ref 7.3–7.4)
PLATELET # BLD AUTO: 183 THOUSANDS/UL (ref 149–390)
PLATELET # BLD AUTO: 214 THOUSANDS/UL (ref 149–390)
PMV BLD AUTO: 10.1 FL (ref 8.9–12.7)
PMV BLD AUTO: 10.6 FL (ref 8.9–12.7)
PO2 BLD: 59 MM HG (ref 35–45)
POTASSIUM BLD-SCNC: 4.5 MMOL/L (ref 3.5–5.3)
POTASSIUM BLD-SCNC: 4.6 MMOL/L (ref 3.5–5.3)
POTASSIUM SERPL-SCNC: 3 MMOL/L (ref 3.5–5.3)
POTASSIUM SERPL-SCNC: 3.5 MMOL/L (ref 3.5–5.3)
POTASSIUM SERPL-SCNC: 4 MMOL/L (ref 3.5–5.3)
PR INTERVAL: 138 MS
PR INTERVAL: 142 MS
PR INTERVAL: 152 MS
PR INTERVAL: 154 MS
PROT SERPL-MCNC: 7.2 G/DL (ref 6.4–8.2)
PROTHROMBIN TIME: 13.9 SECONDS (ref 11.6–14.5)
QRS AXIS: -33 DEGREES
QRS AXIS: -35 DEGREES
QRS AXIS: -37 DEGREES
QRS AXIS: -60 DEGREES
QRSD INTERVAL: 66 MS
QRSD INTERVAL: 75 MS
QRSD INTERVAL: 79 MS
QRSD INTERVAL: 83 MS
QT INTERVAL: 360 MS
QT INTERVAL: 421 MS
QT INTERVAL: 429 MS
QT INTERVAL: 450 MS
QTC INTERVAL: 402 MS
QTC INTERVAL: 419 MS
QTC INTERVAL: 428 MS
QTC INTERVAL: 436 MS
RBC # BLD AUTO: 4.79 MILLION/UL (ref 3.88–5.62)
RBC # BLD AUTO: 5.21 MILLION/UL (ref 3.88–5.62)
SAO2 % BLD FROM PO2: 92 % (ref 60–85)
SODIUM BLD-SCNC: 137 MMOL/L (ref 136–145)
SODIUM BLD-SCNC: 138 MMOL/L (ref 136–145)
SODIUM SERPL-SCNC: 138 MMOL/L (ref 136–145)
SODIUM SERPL-SCNC: 138 MMOL/L (ref 136–145)
SODIUM SERPL-SCNC: 139 MMOL/L (ref 136–145)
SPECIMEN SOURCE: ABNORMAL
T WAVE AXIS: -14 DEGREES
T WAVE AXIS: -18 DEGREES
T WAVE AXIS: -26 DEGREES
T WAVE AXIS: 18 DEGREES
TRIGL SERPL-MCNC: 81 MG/DL
TROPONIN I SERPL-MCNC: 0.2 NG/ML
TROPONIN I SERPL-MCNC: 5.01 NG/ML
TROPONIN I SERPL-MCNC: 7.97 NG/ML
VENTRICULAR RATE: 52 BPM
VENTRICULAR RATE: 62 BPM
VENTRICULAR RATE: 62 BPM
VENTRICULAR RATE: 75 BPM
WBC # BLD AUTO: 11.91 THOUSAND/UL (ref 4.31–10.16)
WBC # BLD AUTO: 12.71 THOUSAND/UL (ref 4.31–10.16)

## 2019-12-27 PROCEDURE — 93308 TTE F-UP OR LMTD: CPT

## 2019-12-27 PROCEDURE — 93321 DOPPLER ECHO F-UP/LMTD STD: CPT | Performed by: INTERNAL MEDICINE

## 2019-12-27 PROCEDURE — C9606 PERC D-E COR REVASC W AMI S: HCPCS | Performed by: EMERGENCY MEDICINE

## 2019-12-27 PROCEDURE — 80047 BASIC METABLC PNL IONIZED CA: CPT

## 2019-12-27 PROCEDURE — 84295 ASSAY OF SERUM SODIUM: CPT

## 2019-12-27 PROCEDURE — 93005 ELECTROCARDIOGRAM TRACING: CPT

## 2019-12-27 PROCEDURE — C1769 GUIDE WIRE: HCPCS | Performed by: EMERGENCY MEDICINE

## 2019-12-27 PROCEDURE — 93010 ELECTROCARDIOGRAM REPORT: CPT | Performed by: INTERNAL MEDICINE

## 2019-12-27 PROCEDURE — 92941 PRQ TRLML REVSC TOT OCCL AMI: CPT | Performed by: INTERNAL MEDICINE

## 2019-12-27 PROCEDURE — B2111ZZ FLUOROSCOPY OF MULTIPLE CORONARY ARTERIES USING LOW OSMOLAR CONTRAST: ICD-10-PCS | Performed by: INTERNAL MEDICINE

## 2019-12-27 PROCEDURE — C1894 INTRO/SHEATH, NON-LASER: HCPCS | Performed by: EMERGENCY MEDICINE

## 2019-12-27 PROCEDURE — 93325 DOPPLER ECHO COLOR FLOW MAPG: CPT | Performed by: INTERNAL MEDICINE

## 2019-12-27 PROCEDURE — 99223 1ST HOSP IP/OBS HIGH 75: CPT | Performed by: ANESTHESIOLOGY

## 2019-12-27 PROCEDURE — 85347 COAGULATION TIME ACTIVATED: CPT

## 2019-12-27 PROCEDURE — 84484 ASSAY OF TROPONIN QUANT: CPT | Performed by: INTERNAL MEDICINE

## 2019-12-27 PROCEDURE — 82947 ASSAY GLUCOSE BLOOD QUANT: CPT

## 2019-12-27 PROCEDURE — 80053 COMPREHEN METABOLIC PANEL: CPT | Performed by: EMERGENCY MEDICINE

## 2019-12-27 PROCEDURE — 93454 CORONARY ARTERY ANGIO S&I: CPT | Performed by: EMERGENCY MEDICINE

## 2019-12-27 PROCEDURE — 82330 ASSAY OF CALCIUM: CPT | Performed by: PHYSICIAN ASSISTANT

## 2019-12-27 PROCEDURE — NC001 PR NO CHARGE: Performed by: INTERNAL MEDICINE

## 2019-12-27 PROCEDURE — 83735 ASSAY OF MAGNESIUM: CPT | Performed by: INTERNAL MEDICINE

## 2019-12-27 PROCEDURE — 85025 COMPLETE CBC W/AUTO DIFF WBC: CPT | Performed by: PHYSICIAN ASSISTANT

## 2019-12-27 PROCEDURE — 82330 ASSAY OF CALCIUM: CPT

## 2019-12-27 PROCEDURE — 93308 TTE F-UP OR LMTD: CPT | Performed by: INTERNAL MEDICINE

## 2019-12-27 PROCEDURE — 99152 MOD SED SAME PHYS/QHP 5/>YRS: CPT | Performed by: EMERGENCY MEDICINE

## 2019-12-27 PROCEDURE — 84132 ASSAY OF SERUM POTASSIUM: CPT

## 2019-12-27 PROCEDURE — 99152 MOD SED SAME PHYS/QHP 5/>YRS: CPT | Performed by: INTERNAL MEDICINE

## 2019-12-27 PROCEDURE — 84484 ASSAY OF TROPONIN QUANT: CPT | Performed by: EMERGENCY MEDICINE

## 2019-12-27 PROCEDURE — 5A12012 PERFORMANCE OF CARDIAC OUTPUT, SINGLE, MANUAL: ICD-10-PCS | Performed by: INTERNAL MEDICINE

## 2019-12-27 PROCEDURE — 85025 COMPLETE CBC W/AUTO DIFF WBC: CPT | Performed by: EMERGENCY MEDICINE

## 2019-12-27 PROCEDURE — C1725 CATH, TRANSLUMIN NON-LASER: HCPCS | Performed by: EMERGENCY MEDICINE

## 2019-12-27 PROCEDURE — 027034Z DILATION OF CORONARY ARTERY, ONE ARTERY WITH DRUG-ELUTING INTRALUMINAL DEVICE, PERCUTANEOUS APPROACH: ICD-10-PCS | Performed by: INTERNAL MEDICINE

## 2019-12-27 PROCEDURE — 80048 BASIC METABOLIC PNL TOTAL CA: CPT | Performed by: PHYSICIAN ASSISTANT

## 2019-12-27 PROCEDURE — 83735 ASSAY OF MAGNESIUM: CPT | Performed by: PHYSICIAN ASSISTANT

## 2019-12-27 PROCEDURE — 99285 EMERGENCY DEPT VISIT HI MDM: CPT | Performed by: EMERGENCY MEDICINE

## 2019-12-27 PROCEDURE — 85610 PROTHROMBIN TIME: CPT | Performed by: EMERGENCY MEDICINE

## 2019-12-27 PROCEDURE — 80061 LIPID PANEL: CPT | Performed by: EMERGENCY MEDICINE

## 2019-12-27 PROCEDURE — 99153 MOD SED SAME PHYS/QHP EA: CPT | Performed by: EMERGENCY MEDICINE

## 2019-12-27 PROCEDURE — 93454 CORONARY ARTERY ANGIO S&I: CPT | Performed by: INTERNAL MEDICINE

## 2019-12-27 PROCEDURE — 36415 COLL VENOUS BLD VENIPUNCTURE: CPT | Performed by: EMERGENCY MEDICINE

## 2019-12-27 PROCEDURE — 99285 EMERGENCY DEPT VISIT HI MDM: CPT

## 2019-12-27 PROCEDURE — C1874 STENT, COATED/COV W/DEL SYS: HCPCS

## 2019-12-27 PROCEDURE — 82803 BLOOD GASES ANY COMBINATION: CPT

## 2019-12-27 PROCEDURE — 96374 THER/PROPH/DIAG INJ IV PUSH: CPT

## 2019-12-27 PROCEDURE — C1887 CATHETER, GUIDING: HCPCS | Performed by: EMERGENCY MEDICINE

## 2019-12-27 PROCEDURE — 70450 CT HEAD/BRAIN W/O DYE: CPT

## 2019-12-27 PROCEDURE — 85014 HEMATOCRIT: CPT

## 2019-12-27 PROCEDURE — 85730 THROMBOPLASTIN TIME PARTIAL: CPT | Performed by: EMERGENCY MEDICINE

## 2019-12-27 PROCEDURE — 80048 BASIC METABOLIC PNL TOTAL CA: CPT | Performed by: INTERNAL MEDICINE

## 2019-12-27 PROCEDURE — 96375 TX/PRO/DX INJ NEW DRUG ADDON: CPT

## 2019-12-27 PROCEDURE — 83735 ASSAY OF MAGNESIUM: CPT | Performed by: EMERGENCY MEDICINE

## 2019-12-27 RX ORDER — FENTANYL CITRATE 50 UG/ML
INJECTION, SOLUTION INTRAMUSCULAR; INTRAVENOUS CODE/TRAUMA/SEDATION MEDICATION
Status: COMPLETED | OUTPATIENT
Start: 2019-12-27 | End: 2019-12-27

## 2019-12-27 RX ORDER — AMOXICILLIN 250 MG
1 CAPSULE ORAL
Status: DISCONTINUED | OUTPATIENT
Start: 2019-12-27 | End: 2019-12-29 | Stop reason: HOSPADM

## 2019-12-27 RX ORDER — ONDANSETRON 2 MG/ML
4 INJECTION INTRAMUSCULAR; INTRAVENOUS EVERY 6 HOURS PRN
Status: DISCONTINUED | OUTPATIENT
Start: 2019-12-27 | End: 2019-12-29 | Stop reason: HOSPADM

## 2019-12-27 RX ORDER — SODIUM CHLORIDE 9 MG/ML
100 INJECTION, SOLUTION INTRAVENOUS CONTINUOUS
Status: DISCONTINUED | OUTPATIENT
Start: 2019-12-27 | End: 2019-12-27

## 2019-12-27 RX ORDER — HEPARIN SODIUM 1000 [USP'U]/ML
4000 INJECTION, SOLUTION INTRAVENOUS; SUBCUTANEOUS ONCE
Status: COMPLETED | OUTPATIENT
Start: 2019-12-27 | End: 2019-12-27

## 2019-12-27 RX ORDER — ATORVASTATIN CALCIUM 80 MG/1
80 TABLET, FILM COATED ORAL EVERY EVENING
Status: DISCONTINUED | OUTPATIENT
Start: 2019-12-27 | End: 2019-12-27

## 2019-12-27 RX ORDER — CALCIUM GLUCONATE 20 MG/ML
1 INJECTION, SOLUTION INTRAVENOUS ONCE
Status: COMPLETED | OUTPATIENT
Start: 2019-12-27 | End: 2019-12-27

## 2019-12-27 RX ORDER — BISACODYL 10 MG
10 SUPPOSITORY, RECTAL RECTAL DAILY PRN
Status: DISCONTINUED | OUTPATIENT
Start: 2019-12-27 | End: 2019-12-29 | Stop reason: HOSPADM

## 2019-12-27 RX ORDER — LIDOCAINE HYDROCHLORIDE 10 MG/ML
INJECTION, SOLUTION EPIDURAL; INFILTRATION; INTRACAUDAL; PERINEURAL CODE/TRAUMA/SEDATION MEDICATION
Status: COMPLETED | OUTPATIENT
Start: 2019-12-27 | End: 2019-12-27

## 2019-12-27 RX ORDER — NITROGLYCERIN 20 MG/100ML
INJECTION INTRAVENOUS CODE/TRAUMA/SEDATION MEDICATION
Status: COMPLETED | OUTPATIENT
Start: 2019-12-27 | End: 2019-12-27

## 2019-12-27 RX ORDER — ASPIRIN 81 MG/1
81 TABLET, CHEWABLE ORAL DAILY
Status: DISCONTINUED | OUTPATIENT
Start: 2019-12-28 | End: 2019-12-29 | Stop reason: HOSPADM

## 2019-12-27 RX ORDER — AMIODARONE HYDROCHLORIDE 50 MG/ML
INJECTION, SOLUTION INTRAVENOUS CODE/TRAUMA/SEDATION MEDICATION
Status: COMPLETED | OUTPATIENT
Start: 2019-12-27 | End: 2019-12-27

## 2019-12-27 RX ORDER — ATORVASTATIN CALCIUM 80 MG/1
80 TABLET, FILM COATED ORAL EVERY EVENING
Status: DISCONTINUED | OUTPATIENT
Start: 2019-12-27 | End: 2019-12-29 | Stop reason: HOSPADM

## 2019-12-27 RX ORDER — ZIPRASIDONE HYDROCHLORIDE 40 MG/1
40 CAPSULE ORAL 2 TIMES DAILY
Status: DISCONTINUED | OUTPATIENT
Start: 2019-12-27 | End: 2019-12-28

## 2019-12-27 RX ORDER — NITROGLYCERIN 0.4 MG/1
0.4 TABLET SUBLINGUAL
Status: DISCONTINUED | OUTPATIENT
Start: 2019-12-27 | End: 2019-12-29 | Stop reason: HOSPADM

## 2019-12-27 RX ORDER — NICOTINE 21 MG/24HR
1 PATCH, TRANSDERMAL 24 HOURS TRANSDERMAL DAILY
Status: DISCONTINUED | OUTPATIENT
Start: 2019-12-27 | End: 2019-12-29 | Stop reason: HOSPADM

## 2019-12-27 RX ORDER — MAGNESIUM SULFATE HEPTAHYDRATE 40 MG/ML
2 INJECTION, SOLUTION INTRAVENOUS ONCE
Status: COMPLETED | OUTPATIENT
Start: 2019-12-27 | End: 2019-12-27

## 2019-12-27 RX ORDER — HEPARIN SODIUM 1000 [USP'U]/ML
INJECTION, SOLUTION INTRAVENOUS; SUBCUTANEOUS CODE/TRAUMA/SEDATION MEDICATION
Status: COMPLETED | OUTPATIENT
Start: 2019-12-27 | End: 2019-12-27

## 2019-12-27 RX ORDER — ISOSORBIDE MONONITRATE 30 MG/1
30 TABLET, EXTENDED RELEASE ORAL DAILY
Status: DISCONTINUED | OUTPATIENT
Start: 2019-12-27 | End: 2019-12-28

## 2019-12-27 RX ORDER — SODIUM CHLORIDE 9 MG/ML
3 INJECTION INTRAVENOUS AS NEEDED
Status: DISCONTINUED | OUTPATIENT
Start: 2019-12-27 | End: 2019-12-29 | Stop reason: HOSPADM

## 2019-12-27 RX ORDER — ACETAMINOPHEN 325 MG/1
650 TABLET ORAL EVERY 6 HOURS PRN
Status: DISCONTINUED | OUTPATIENT
Start: 2019-12-27 | End: 2019-12-29 | Stop reason: HOSPADM

## 2019-12-27 RX ORDER — CEFAZOLIN SODIUM 2 G/50ML
2000 SOLUTION INTRAVENOUS ONCE
Status: DISCONTINUED | OUTPATIENT
Start: 2019-12-27 | End: 2019-12-27

## 2019-12-27 RX ORDER — TOPIRAMATE 100 MG/1
100 TABLET, FILM COATED ORAL 2 TIMES DAILY
Status: DISCONTINUED | OUTPATIENT
Start: 2019-12-27 | End: 2019-12-29 | Stop reason: HOSPADM

## 2019-12-27 RX ORDER — MIDAZOLAM HYDROCHLORIDE 2 MG/2ML
INJECTION, SOLUTION INTRAMUSCULAR; INTRAVENOUS CODE/TRAUMA/SEDATION MEDICATION
Status: COMPLETED | OUTPATIENT
Start: 2019-12-27 | End: 2019-12-27

## 2019-12-27 RX ORDER — VERAPAMIL HYDROCHLORIDE 2.5 MG/ML
INJECTION, SOLUTION INTRAVENOUS CODE/TRAUMA/SEDATION MEDICATION
Status: COMPLETED | OUTPATIENT
Start: 2019-12-27 | End: 2019-12-27

## 2019-12-27 RX ADMIN — TICAGRELOR 90 MG: 90 TABLET ORAL at 22:45

## 2019-12-27 RX ADMIN — VERAPAMIL HYDROCHLORIDE 2.5 MG: 2.5 INJECTION INTRAVENOUS at 07:47

## 2019-12-27 RX ADMIN — NITROGLYCERIN 200 MCG: 20 INJECTION INTRAVENOUS at 08:04

## 2019-12-27 RX ADMIN — MAGNESIUM SULFATE HEPTAHYDRATE 2 G: 40 INJECTION, SOLUTION INTRAVENOUS at 12:37

## 2019-12-27 RX ADMIN — NITROGLYCERIN 200 MCG: 20 INJECTION INTRAVENOUS at 07:47

## 2019-12-27 RX ADMIN — AMIODARONE HYDROCHLORIDE 150 MG: 50 INJECTION, SOLUTION INTRAVENOUS at 07:19

## 2019-12-27 RX ADMIN — MIDAZOLAM 1 MG: 1 INJECTION INTRAMUSCULAR; INTRAVENOUS at 07:59

## 2019-12-27 RX ADMIN — AMIODARONE HYDROCHLORIDE 1 MG/MIN: 50 INJECTION, SOLUTION INTRAVENOUS at 07:48

## 2019-12-27 RX ADMIN — FENTANYL CITRATE 25 MCG: 50 INJECTION, SOLUTION INTRAMUSCULAR; INTRAVENOUS at 07:58

## 2019-12-27 RX ADMIN — CALCIUM GLUCONATE 1 G: 20 INJECTION, SOLUTION INTRAVENOUS at 12:45

## 2019-12-27 RX ADMIN — SODIUM CHLORIDE 100 ML/HR: 0.9 INJECTION, SOLUTION INTRAVENOUS at 08:57

## 2019-12-27 RX ADMIN — SENNOSIDES AND DOCUSATE SODIUM 1 TABLET: 8.6; 5 TABLET ORAL at 22:45

## 2019-12-27 RX ADMIN — METOPROLOL TARTRATE 25 MG: 25 TABLET ORAL at 11:26

## 2019-12-27 RX ADMIN — FENTANYL CITRATE 50 MCG: 50 INJECTION, SOLUTION INTRAMUSCULAR; INTRAVENOUS at 07:43

## 2019-12-27 RX ADMIN — ATORVASTATIN CALCIUM 80 MG: 80 TABLET, FILM COATED ORAL at 17:11

## 2019-12-27 RX ADMIN — HEPARIN SODIUM 6000 UNITS: 1000 INJECTION INTRAVENOUS; SUBCUTANEOUS at 07:52

## 2019-12-27 RX ADMIN — ZIPRASIDONE HCL 40 MG: 40 CAPSULE ORAL at 12:22

## 2019-12-27 RX ADMIN — ZIPRASIDONE HCL 40 MG: 40 CAPSULE ORAL at 20:54

## 2019-12-27 RX ADMIN — HEPARIN SODIUM 4000 UNITS: 1000 INJECTION INTRAVENOUS; SUBCUTANEOUS at 07:01

## 2019-12-27 RX ADMIN — TOPIRAMATE 100 MG: 100 TABLET, FILM COATED ORAL at 17:13

## 2019-12-27 RX ADMIN — IOHEXOL 140 ML: 350 INJECTION, SOLUTION INTRAVENOUS at 08:16

## 2019-12-27 RX ADMIN — MIDAZOLAM 2 MG: 1 INJECTION INTRAMUSCULAR; INTRAVENOUS at 07:43

## 2019-12-27 RX ADMIN — TICAGRELOR 180 MG: 90 TABLET ORAL at 07:00

## 2019-12-27 RX ADMIN — LIDOCAINE HYDROCHLORIDE 1 ML: 10 INJECTION, SOLUTION EPIDURAL; INFILTRATION; INTRACAUDAL; PERINEURAL at 07:44

## 2019-12-27 NOTE — ED ATTENDING ATTESTATION
12/27/2019  IJonelle MD, saw and evaluated the patient  I have discussed the patient with the resident/non-physician practitioner and agree with the resident's/non-physician practitioner's findings, Plan of Care, and MDM as documented in the resident's/non-physician practitioner's note, except where noted  All available labs and Radiology studies were reviewed  I was present for key portions of any procedure(s) performed by the resident/non-physician practitioner and I was immediately available to provide assistance  At this point I agree with the current assessment done in the Emergency Department  I have conducted an independent evaluation of this patient a history and physical is as follows:    ED Course     79-year-old male, seen on arrival to the emergency department for evaluation of chest pain  Patient states that he awoke approximately an hour ago with substernal chest pain radiating into both of his arms  Positive nausea  No diaphoresis  No radiation of pain into the back or the abdomen  No shortness of breath  Ten systems reviewed negative except as noted in the history of present illness  The patient is resting comfortably on a stretcher in no acute respiratory distress  The patient appears nontoxic  HEENT reveals moist mucous membranes  Head is normocephalic and atraumatic  Conjunctiva and sclera are normal  Neck is nontender and supple with full range of motion to flexion, extension, lateral rotation  No meningismus appreciated  No masses are appreciated  Lungs are clear to auscultation bilaterally without any wheezes, rales or rhonchi  Heart is regular rate and rhythm without any murmurs, rubs or gallops  Abdomen is soft and nontender without any rebound or guarding  Extremities appear grossly normal without any significant arthropathy  Bilateral lower extremity edema is noted  Patient is awake, alert, and oriented x3  The patient has normal interaction   Motor is 5 out of 5  Assessment and plan:  Patient was seen immediately on arrival   EKG demonstrates ST elevation in V1 and V2   MI alert was called and the patient was discussed with on-call interventional cardiologist Dr Anthony Jean  Patient will be administered heparin and Brilinta per protocol  Pre-hospital MI alert was not activated on this patient although he arrived by ambulance  The reason for this was that there was no communication with EMS prior to the patient arriving by ambulance because the ambulance was from [facility] in [place] and is not able to communicate through command radio      Labs Reviewed   CBC AND DIFFERENTIAL - Abnormal       Result Value Ref Range Status    WBC 11 91 (*) 4 31 - 10 16 Thousand/uL Final    RBC 5 21  3 88 - 5 62 Million/uL Final    Hemoglobin 17 0  12 0 - 17 0 g/dL Final    Hematocrit 50 7 (*) 36 5 - 49 3 % Final    MCV 97  82 - 98 fL Final    MCH 32 6  26 8 - 34 3 pg Final    MCHC 33 5  31 4 - 37 4 g/dL Final    RDW 14 4  11 6 - 15 1 % Final    MPV 10 1  8 9 - 12 7 fL Final    Platelets 353  624 - 390 Thousands/uL Final    nRBC 0  /100 WBCs Final    Neutrophils Relative 76 (*) 43 - 75 % Final    Immat GRANS % 0  0 - 2 % Final    Lymphocytes Relative 17  14 - 44 % Final    Monocytes Relative 6  4 - 12 % Final    Eosinophils Relative 1  0 - 6 % Final    Basophils Relative 0  0 - 1 % Final    Neutrophils Absolute 8 98 (*) 1 85 - 7 62 Thousands/µL Final    Immature Grans Absolute 0 03  0 00 - 0 20 Thousand/uL Final    Lymphocytes Absolute 1 99  0 60 - 4 47 Thousands/µL Final    Monocytes Absolute 0 75  0 17 - 1 22 Thousand/µL Final    Eosinophils Absolute 0 11  0 00 - 0 61 Thousand/µL Final    Basophils Absolute 0 05  0 00 - 0 10 Thousands/µL Final   POCT BLOOD GAS (CG8+) - Abnormal    ph, Brady ISTAT 7 471 (*) 7 300 - 7 400 Final    pCO2, Brady i-STAT 36 2 (*) 42 0 - 50 0 mm HG Final    pO2, Brady i-STAT 59 0 (*) 35 0 - 45 0 mm HG Final    BE, i-STAT 3  -2 - 3 mmol/L Final    HCO3, Brady i-STAT 26 4  24 0 - 30 0 mmol/L Final    CO2, i-STAT 28  21 - 32 mmol/L Final    O2 Sat, i-STAT 92 (*) 60 - 85 % Final    SODIUM, I-STAT 138  136 - 145 mmol/l Final    Potassium, i-STAT 4 6  3 5 - 5 3 mmol/L Final    Calcium, Ionized i-STAT 1 03 (*) 1 12 - 1 32 mmol/L Final    Hct, i-STAT 51 (*) 36 5 - 49 3 % Final    Hgb, i-STAT 17 3 (*) 12 0 - 17 0 g/dl Final    Glucose, i-STAT 145 (*) 65 - 140 mg/dl Final    Specimen Type VENOUS   Final   POCT CHEM 8+ - Abnormal    SODIUM, I-STAT 137  136 - 145 mmol/l Final    Potassium, i-STAT 4 5  3 5 - 5 3 mmol/L Final    Chloride, istat 103  100 - 108 mmol/L Final    CO2, i-STAT 27  21 - 32 mmol/L Final    Anion Gap, i-STAT 13  4 - 13 mmol/L Final    Calcium, Ionized i-STAT 1 00 (*) 1 12 - 1 32 mmol/L Final    BUN, I-STAT 12  5 - 25 mg/dl Final    Creatinine, i-STAT 0 8  0 6 - 1 3 mg/dl Final    eGFR 96  ml/min/1 73sq m Final    Glucose, i-STAT 145 (*) 65 - 140 mg/dl Final    Hct, i-STAT 52 (*) 36 5 - 49 3 % Final    Hgb, i-STAT 17 7 (*) 12 0 - 17 0 g/dl Final    Specimen Type VENOUS   Final    Narrative:     National Kidney Disease Foundation guidelines for Chronic Kidney Disease (CKD):     Stage 1 with normal or high GFR (GFR > 90 mL/min/1 73 square meters)    Stage 2 Mild CKD (GFR = 60-89 mL/min/1 73 square meters)    Stage 3A Moderate CKD (GFR = 45-59 mL/min/1 73 square meters)    Stage 3B Moderate CKD (GFR = 30-44 mL/min/1 73 square meters)    Stage 4 Severe CKD (GFR = 15-29 mL/min/1 73 square meters)    Stage 5 End Stage CKD (GFR <15 mL/min/1 73 square meters)  Note: GFR calculation is accurate only with a steady state creatinine   LIPID PANEL   MAGNESIUM   PROTIME-INR   APTT   TROPONIN I   COMPREHENSIVE METABOLIC PANEL       XR chest 1 view portable    (Results Pending)         Critical Care Time  Procedures

## 2019-12-27 NOTE — BRIEF OP NOTE (RAD/CATH)
Cardiac catheterization:  Right radial artery    Indications:  History of LAD stent, anterior STEMI    Coronary angiography:  The coronary anatomy is a right-dominant system  The left main is unobstructed  It bifurcates into a left anterior descending left circumflex branch  The left anterior descending artery is a large vessel that reaches the apex  It was previously stented in its proximal and midportion  At the distal aspect of the mid stent there is a focal 99% obstruction  The circumflex is a large vessel with luminal irregularities  The right coronary artery has mild luminal irregularity  Coronary intervention:  The left anterior descending artery lesion was stented with a 3 5 x 18 mm Xience drug-eluting stent to 16 atmospheres  Excellent angiographic result was obtained with no residual obstruction

## 2019-12-27 NOTE — LETTER
[address] [facility] [address]  [address] [address]  Dept: [phone]    December 29, 2019     Patient: Serenity Guerrero   YOB: 1955   Date of Visit: 12/27/2019       To Whom it May Concern:    Kan Caceres is under my professional care  He was seen in the hospital from 12/27/2019   to 12/29/19  He may return to work on 12/30/2019 with the following limitations no lifting objects greater than 10 lbs for 3-4 days post procedure  May resume normal activities as tolerated thereafter  If you have any questions or concerns, please don't hesitate to call           Sincerely,          Cristofer Paul PA-C

## 2019-12-27 NOTE — PROGRESS NOTES
Went for home visit, patient was doing well  CHW assisted in getting paperwork together and completing [employer] leslie care application  CHW reminded patient care manager from the [employer] program will be calling to schedule a home visit and Sherlyn from [employer] will also be calling to schedule volunteers to come out and assist patient  CHW has found a free dresser and tv stand for patient, they can be delivered to him after the new year  Patient states he has food and uses food bans when needed  Has all medications and is taking as prescribed  Has not r/s cardiac rehab and CHW stated it would benefit patient, he stated he will think about it  CHW will continue to assist and monitor for future needs

## 2019-12-27 NOTE — CONSULTS
Consult - [address] Yolanda 60 y o  male MRN: [id number]  Unit/Bed#: [facility] 951-26 Encounter: [id number]      -------------------------------------------------------------------------------------------------------------  Chief Complaint: "I want something to drink and eat"    History of Present Illness   Luis Pat is a 60 y o  male with past medical history significant for CAD status post PCI with LAD stent in August 2019 and longstanding tobacco use  Patient reports around 5:00 a m  This morning, he was woken up from his sleep with central chest pain  Attempted to contact his emergency contact but could not get through so called 911 at this pain was similar but worse than his most recent episode in August   In the emergency department, patient found to have ST segment elevations in anterior portion EKG  Troponin 0 2  Pt reports missing doses of his plavix recently  Loaded with aspirin and Brilinta  Patient cath lab for urgent catheterization  Preprocedure, patient became unresponsive and VFib noted on monitor  Received less than 30 seconds compressions and defibrillation x4 with ROSC  Given amnio bolus and started on amiodarone drip  Proceeded with left heart catheterization which revealed 99% occlusion in the distal mid stent of LAD  Drug-eluting stent placed with excellent angiographic result and no residual obstruction  No issues with ectopy or arrhythmias following catheterization  Extubated to nasal cannula postprocedure early  Admitted to ICU under cardiology service with Critical Care consult for close hemodynamic observation      History obtained from chart review and the patient   -------------------------------------------------------------------------------------------------------------  Assessment and Plan:    Neuro:  · Diagnosis: Bipolar disorder  · Plan:  · Continue home topamax and geodon  · Pain controlled with:  · PRN: tylenol  · Delirium Precautions  · CAM ICU per protocol  · Regulate sleep/wake cycle+  · Trend neuro exam  · Frequent neurovascular checks of cath site (LUE)    CV:   · Diagnosis: Anterior STEMI s/p PCI with CHARBEL to LAD, V-fib arrest s/p defibrillation x4 with ROSC  · Plan:   · Cardiology primary  · Continue GDMT per cardiology  · ASA 81 daily  · Lipitor 80mg daily  · Lopressor 25mg BID  · Some bradycardia in high 50s, may need to lower to 12 5 BID  · Brilinta 90mg Q12  · Continue amiodarone gtt  · F/u echo results  · Trend trops  · F/u lipid panel  · Hemodynamic infusions: None  · MAP goal > 65  · Systolic (99RWV), FKL:338 , Min:101 , Max:116   ·   · Continue imdur 30mg daily  · Rhythm: NSR with occasional PVC  · Follow rhythm on telemetry    Lung:   · Diagnosis: COPD, nicotine dependence  · Plan:   · Continue nicotine patch  · Pulmonary hygiene  · Wean NC as able with goal SpO2 >88%    GI:   · Diagnosis: No acute issues  · Plan:   · Bowel regimen: colace , sennakot  and dulcolax suppository     FEN:   · Plan:   · Fluid/Diuretic plan: No diuretic or IVF indicated at this time  · Nutrition/diet plan: Cardiac diet  · Replete electrolytes with goals: K >4 0, Mag >2 0, and Phos >3 0  · Follow-up lytes now    :   · Diagnosis: No acute issues  · Plan:   · Cr at baseline  · Indwelling Mason present: yes   · Trend UOP and BUN/creat  · Strict I and O  · No intake or output data in the 24 hours ending 19 0852    ID:   · Diagnosis: No acute issues  · Plan:   · Trend temps and WBC count  · Temp (24hrs), Av 3 °F (36 8 °C), Min:98 3 °F (36 8 °C), Max:98 3 °F (36 8 °C)  ·     Heme:   · Diagnosis: No acute issues  · Plan:   · Trend hgb and plts  · Transfuse as needed for goal hgb >7 0    Endo:   · Diagnosis: No acute issues  · Plan:   · No insulin indicated at this time    MSK/Skin:  · Plan:   · Mobility goal: OOB as able  · PT consult: yes  · OT consult: yes  · Frequent turning and pressure off-loading      Disposition: Continue Critical Care   Code Status: Level 1 - Full Code  --------------------------------------------------------------------------------------------------------------  Review of Systems   Constitutional: Negative for chills, diaphoresis, fatigue and fever  Eyes: Negative for pain and visual disturbance  Respiratory: Negative for cough, shortness of breath and wheezing  Cardiovascular: Negative for chest pain and palpitations  Gastrointestinal: Negative for abdominal pain, constipation, diarrhea, nausea and vomiting  Musculoskeletal: Negative for arthralgias, neck pain and neck stiffness  Neurological: Negative for dizziness, tremors, weakness, light-headedness, numbness and headaches  A 12-point, complete review of systems was reviewed and negative except as stated above     Physical Exam   Constitutional: He is oriented to person, place, and time  He appears well-developed  No distress  HENT:   Head: Normocephalic and atraumatic  Poor dentition with caries and missing teeth  Eyes: Pupils are equal, round, and reactive to light  EOM are normal    Keeping left eyelid closed more frequently than right- states he is tired  May be secondary to sedation wearing off  Will re-assess  Neck: Normal range of motion  Neck supple  Cardiovascular: Normal rate, regular rhythm and normal heart sounds  Exam reveals no friction rub  No murmur heard  Pulmonary/Chest: He has no wheezes  He has no rales  Breath sounds diminished throughout  Decreased inspiratory effort   Abdominal: Soft  Bowel sounds are normal  He exhibits no distension  There is no tenderness  There is no guarding  Musculoskeletal: He exhibits edema (trace lower extremity)  Neurological: He is alert and oriented to person, place, and time  R  Hand distal to cath site neurovascularly intact with good sensation and warmth- no numbness or tingling  Strength 5/5 in all extremities  Skin: Skin is warm and dry  Capillary refill takes less than 2 seconds   He is not diaphoretic  Vitals reviewed  --------------------------------------------------------------------------------------------------------------  Historical Information   Past Medical History:   Diagnosis Date    [facility] disorder ([facility] )     CAD (coronary artery disease)     Schizo-affective psychosis ([facility] )     Tobacco user      Past Surgical History:   Procedure Laterality Date    BACK SURGERY      CARDIAC SURGERY      stent placement    CHOLECYSTECTOMY      KNEE SURGERY Right      Social History   Social History     Substance and Sexual Activity   Alcohol Use Not Currently    Frequency: Never     Social History     Substance and Sexual Activity   Drug Use Never     Social History     Tobacco Use   Smoking Status Heavy Tobacco Smoker    Packs/day: 1 50    Types: Cigarettes   Smokeless Tobacco Never Used     Family History:   Family History   Problem Relation Age of Onset    Heart disease Mother     Cancer Mother     Heart disease Father     Cancer Father     Multiple sclerosis Sister     Cancer Sister      I have reviewed this patient's family history and commented on sigificant items within the HPI    Vitals:   Vitals:    12/27/19 0647 12/27/19 0654 12/27/19 0844   BP:  116/71 101/63   Pulse: 77 74 61   Resp: 20 20 20   Temp:  98 3 °F (36 8 °C)    TempSrc:  Oral    SpO2: 97% 94% 99%   Weight: 103 kg (226 lb 10 1 oz)  102 kg (224 lb 10 4 oz)   Height: 5' 10" (1 778 m)  5' 9" (1 753 m)     Temp  Min: 98 3 °F (36 8 °C)  Max: 98 3 °F (36 8 °C)  IBW: 70 7 kg  Height: 5' 9" (175 3 cm)  Body mass index is 33 17 kg/m²    N/A    Medications:  Current Facility-Administered Medications   Medication Dose Route Frequency    acetaminophen (TYLENOL) tablet 650 mg  650 mg Oral Q6H PRN    amiodarone (CORDARONE) 900 mg in dextrose 5 % 500 mL infusion  1 mg/min Intravenous Continuous    [START ON 12/28/2019] aspirin chewable tablet 81 mg  81 mg Oral Daily    atorvastatin (LIPITOR) tablet 80 mg  80 mg Oral QPM  [START ON 12/28/2019] enoxaparin (LOVENOX) subcutaneous injection 40 mg  40 mg Subcutaneous Daily    isosorbide mononitrate (IMDUR) 24 hr tablet 30 mg  30 mg Oral Daily    metoprolol tartrate (LOPRESSOR) tablet 25 mg  25 mg Oral Q12H [address]    nicotine (NICODERM CQ) 21 mg/24 hr TD 24 hr patch 1 patch  1 patch Transdermal Daily    nitroglycerin (NITROSTAT) SL tablet 0 4 mg  0 4 mg Sublingual Q5 Min PRN    ondansetron (ZOFRAN) injection 4 mg  4 mg Intravenous Q6H PRN    sodium chloride (PF) 0 9 % injection 3 mL  3 mL Intravenous PRN    sodium chloride 0 9 % infusion  100 mL/hr Intravenous Continuous    ticagrelor (BRILINTA) tablet 90 mg  90 mg Oral Q12H PROSPER    topiramate (TOPAMAX) tablet 100 mg  100 mg Oral BID    ziprasidone (GEODON) capsule 40 mg  40 mg Oral BID     Home medications:  Prior to Admission Medications   Prescriptions Last Dose Informant Patient Reported?  Taking?   aspirin 81 mg chewable tablet  Self No No   Sig: Chew 1 tablet (81 mg total) daily   atorvastatin (LIPITOR) 80 mg tablet   No No   Sig: Take 1 tablet (80 mg total) by mouth every evening   clopidogrel (PLAVIX) 75 mg tablet   No No   Sig: Take 1 tablet (75 mg total) by mouth daily   isosorbide mononitrate (IMDUR) 30 mg 24 hr tablet   No No   Sig: Take 1 tablet (30 mg total) by mouth daily   metoprolol tartrate (LOPRESSOR) 25 mg tablet   No No   Sig: Take 1 tablet (25 mg total) by mouth every 12 (twelve) hours   nitroglycerin (NITROSTAT) 0 4 mg SL tablet   No No   Sig: Place 1 tablet (0 4 mg total) under the tongue every 5 (five) minutes as needed for chest pain   ranolazine (RANEXA) 500 mg 12 hr tablet   No No   Sig: Take 1 tablet (500 mg total) by mouth 2 (two) times a day   topiramate (TOPAMAX) 100 mg tablet  Self Yes No   Sig: Take 100 mg by mouth 3 (three) times a day   ziprasidone (GEODON) 40 mg capsule  Self Yes No   Sig: Take 40 mg by mouth 2 (two) times a day      Facility-Administered Medications: None Allergies: Allergies   Allergen Reactions    Fish-Derived Products Hives    Rice Bean [facility]Phaseolus] [facility]         Laboratory and Diagnostics:  Results from last 7 days   Lab Units 12/27/19  0659 12/27/19  0658 12/27/19  0657   WBC Thousand/uL  --   --  11 91*   HEMOGLOBIN g/dL  --   --  17 0   I STAT HEMOGLOBIN g/dl 17 7* 17 3*  --    HEMATOCRIT %  --   --  50 7*   HEMATOCRIT, ISTAT % 52* 51*  --    PLATELETS Thousands/uL  --   --  214   NEUTROS PCT %  --   --  76*   MONOS PCT %  --   --  6     Results from last 7 days   Lab Units 12/27/19  0736 12/27/19  0659 12/27/19  0658 12/27/19  0657   SODIUM mmol/L 139  --   --  138   POTASSIUM mmol/L 3 5  --   --  3 0*   CHLORIDE mmol/L 108  --   --  107   CO2 mmol/L 25  --   --  25   CO2, I-STAT mmol/L  --  27 28  --    AGAP mmol/L  --  13  --   --    ANION GAP mmol/L 6  --   --  6   BUN mg/dL 10  --   --  11   CREATININE mg/dL 0 97  --   --  0 99   CALCIUM mg/dL 8 7  --   --  8 9   GLUCOSE RANDOM mg/dL 118  --   --  142*   ALT U/L  --   --   --  22   AST U/L  --   --   --  14   ALK PHOS U/L  --   --   --  71   ALBUMIN g/dL  --   --   --  3 3*   TOTAL BILIRUBIN mg/dL  --   --   --  1 07*     Results from last 7 days   Lab Units 12/27/19  0736 12/27/19  0657   MAGNESIUM mg/dL 2 1 2 1      Results from last 7 days   Lab Units 12/27/19  0657   INR  1 11   PTT seconds 26      Results from last 7 days   Lab Units 12/27/19  0657   TROPONIN I ng/mL 0 20*         ABG:    VBG:          Micro:          EKG: NSR  Imaging: I have personally reviewed pertinent reports     and I have personally reviewed pertinent films in PACS    ------------------------------------------------------------------------------------------------------------  Advance Directive and Living Will:      Power of :    POLST:    ------------------------------------------------------------------------------------------------------------  Anticipated Length of Stay is > 2 midnights    Counseling / Coordination of Care  Total time spent today 33 minutes  Greater than 50% of total time was spent with the patient and / or family counseling and / or coordination of care  A description of the counseling / coordination of care: physical, ROS, documentation      Mary Jane Archibald PA-C        Portions of the record may have been created with voice recognition software  Occasional wrong word or "sound a like" substitutions may have occurred due to the inherent limitations of voice recognition software    Read the chart carefully and recognize, using context, where substitutions have occurred

## 2019-12-27 NOTE — H&P
Cardiology   Yaima Guerrero 60 y o  male MRN: [id number]  Unit/Bed#: [facility] Encounter: [id number]      Principal Problem: STEMI    Physician Requesting Consult:  Katie Tolliver MD    Cardiologist: Dr Vance Simmons        Assessment/Plan:    >> STEMI- ISH in anterior leads  >> Ventricular fibrillation s/p defibrillation x 4  >> CAD-known  >> Smoking  >> COPD      Plan:    Patient loaded with aspirin, Brilinta-will likely have to switch to Plavix as he does not have insurance, on heparin  This likely does not represent a Plavix failure as the patient was not taking Plavix regularly  150 mg of amiodarone given post ROSC  Continue amiodarone infusion  Proceed with [facility]  Smoking cessation counseling; patient has refused to quit in the past           CC: Chest pain    HPI: Yaima Guerrero 60y o  year old male who presents with chest pain that woke him up from sleep at 5:00 a m     This pain was similar to the chest pain he has had in the past but worse  It is located in the center of his chest radiating to the left arm  Is associated with nausea  He called 911 and came to the emergency room  He states that he has missed doses of his Plavix in the last couple of weeks  He was recently ordered ranolazine for presumed microvascular disease by his outpatient cardiologist, however he has not started this  He adamantly refused to quit smoking or to go to cardiac rehab  He had an LAD stent placed in August 2019 which is widely patent in November 2019 when he presented with chest pain  His jailed diagonal was also open  Upon transfer to the cath lab table the patient went into ventricular fibrillation, he was defibrillated with 200 joules x4 for recurrent ventricular fibrillation  He received < 30 seconds of chest compressions before recovering spontaneously  He was given a bolus of 150 mg of IV amiodarone      Unable to obtain review of systems due to patient condition    EKG shows sinus tachycardia with ST elevations and what appear appear to be hyperacute T-waves in the anterior leads    CORONARY CIRCULATION 11/2019:  Left main: Normal   LAD: The vessel was normal sized  There was nonobstructive plaque  There were no significant lesions  The site of prior stent placement in 8/19 remains widely patent  The jailed D2 branch is also free of obstructive disease  Circumflex: The vessel was normal sized and gave rise to two OM and three posterolateral branches  No discrete lesions were seen  RCA: The vessel was normal sized and dominant   There was minor nonobstructive plaque          Family History:   Family History   Problem Relation Age of Onset    Heart disease Mother     Cancer Mother     Heart disease Father     Cancer Father     Multiple sclerosis Sister     Cancer Sister      Historical Information   Past Medical History:   Diagnosis Date    [facility]ipolar [facility] disorder [facility] [address] [facility]     CAD (coronary artery disease)     [facility] )     Tobacco user      Past Surgical History:   Procedure Laterality Date    BACK SURGERY      CARDIAC SURGERY      stent placement    CHOLECYSTECTOMY      KNEE SURGERY Right      Social History   Social History     Substance and Sexual Activity   Alcohol Use Not Currently    Frequency: Never     Social History     Substance and Sexual Activity   Drug Use Never     Social History     Tobacco Use   Smoking Status Heavy Tobacco Smoker    Packs/day: 1 50    Types: Cigarettes   Smokeless Tobacco Never Used     Family History:   Family History   Problem Relation Age of Onset    Heart disease Mother     Cancer Mother     Heart disease Father     Cancer Father     Multiple sclerosis Sister     Cancer Sister        Review of Systems:  Review of Systems        Scheduled Meds:  Current Facility-Administered Medications:  amiodarone 1 mg/min Intravenous Continuous Esthela Orta MD   amiodarone   Code/Trauma/Sedation Med [place] plain, DO   sodium chloride (PF) 3 mL Intravenous PRN Judah Smith MD   [START ON 12/28/2019] ticagrelor 90 mg Oral Q12H [address] Judah Smith MD     Continuous Infusions:  amiodarone 1 mg/min     PRN Meds: amiodarone    Insert peripheral IV **AND** sodium chloride (PF)  current meds:   Current Facility-Administered Medications   Medication Dose Route Frequency    amiodarone (CORDARONE) 900 mg in dextrose 5 % 500 mL infusion  1 mg/min Intravenous Continuous    amiodarone 150 mg/3 mL injection    Code/Trauma/Sedation Med    sodium chloride (PF) 0 9 % injection 3 mL  3 mL Intravenous PRN    [START ON 12/28/2019] ticagrelor (BRILINTA) tablet 90 mg  90 mg Oral Q12H [address]    and PTA meds:   Prior to Admission Medications   Prescriptions Last Dose Informant Patient Reported?  Taking?   aspirin 81 mg chewable tablet  Self No No   Sig: Chew 1 tablet (81 mg total) daily   atorvastatin (LIPITOR) 80 mg tablet   No No   Sig: Take 1 tablet (80 mg total) by mouth every evening   clopidogrel (PLAVIX) 75 mg tablet   No No   Sig: Take 1 tablet (75 mg total) by mouth daily   isosorbide mononitrate (IMDUR) 30 mg 24 hr tablet   No No   Sig: Take 1 tablet (30 mg total) by mouth daily   metoprolol tartrate (LOPRESSOR) 25 mg tablet   No No   Sig: Take 1 tablet (25 mg total) by mouth every 12 (twelve) hours   nitroglycerin (NITROSTAT) 0 4 mg SL tablet   No No   Sig: Place 1 tablet (0 4 mg total) under the tongue every 5 (five) minutes as needed for chest pain   ranolazine (RANEXA) 500 mg 12 hr tablet   No No   Sig: Take 1 tablet (500 mg total) by mouth 2 (two) times a day   topiramate (TOPAMAX) 100 mg tablet  Self Yes No   Sig: Take 100 mg by mouth 3 (three) times a day   ziprasidone (GEODON) 40 mg capsule  Self Yes No   Sig: Take 40 mg by mouth 2 (two) times a day      Facility-Administered Medications: None       Allergies   Allergen Reactions    Fish-Derived Products Hives    Rice Bean [Phaseolus] Hives       Objective   Vitals: Blood pressure 116/71, pulse 74, temperature 98 3 °F (36 8 °C), temperature source Oral, resp  rate 20, height 5' 10" (1 778 m), weight 103 kg (226 lb 10 1 oz), SpO2 94 %  , Body mass index is 32 52 kg/m² , Orthostatic Blood Pressures      Most Recent Value   Blood Pressure  116/71 filed at 12/27/2019 7152   Patient Position - Orthostatic VS  Lying filed at 12/27/2019 0654          No intake or output data in the 24 hours ending 12/27/19 0723    Invasive Devices     Peripheral Intravenous Line            Peripheral IV 12/27/19 Left Arm less than 1 day    Peripheral IV 12/27/19 Left Arm less than 1 day    Peripheral IV 12/27/19 Right Arm less than 1 day                Physical Exam:    General:  AO x3, in pain  Neck: Supple, no thyromegaly, no bruits  Cardiac:  S1-S2 normal  No murmurs, rubs or gallops, JVP:  Not seen  Lungs:  Clear to auscultation bilaterally, no wheezing or crackles    Abdomen:  Soft nontender nondistended, positive bowel sounds  Extremities:  Warm, well perfused, pulses palpable, no ulcers or rashes, no pedal edema  Neuro: Grossly nonfocal    Lab Results:   Recent Results (from the past 24 hour(s))   ECG 12 lead    Collection Time: 12/27/19  6:49 AM   Result Value Ref Range    Ventricular Rate 75 BPM    Atrial Rate 75 BPM    WV Interval 152 ms    QRSD Interval 66 ms    QT Interval 360 ms    QTC Interval 402 ms    P Axis 55 degrees    QRS Axis -60 degrees    T Wave Axis 18 degrees   CBC and differential    Collection Time: 12/27/19  6:57 AM   Result Value Ref Range    WBC 11 91 (H) 4 31 - 10 16 Thousand/uL    RBC 5 21 3 88 - 5 62 Million/uL    Hemoglobin 17 0 12 0 - 17 0 g/dL    Hematocrit 50 7 (H) 36 5 - 49 3 %    MCV 97 82 - 98 fL    MCH 32 6 26 8 - 34 3 pg    MCHC 33 5 31 4 - 37 4 g/dL    RDW 14 4 11 6 - 15 1 %    MPV 10 1 8 9 - 12 7 fL    Platelets 641 440 - 371 Thousands/uL    nRBC 0 /100 WBCs    Neutrophils Relative 76 (H) 43 - 75 %    Immat GRANS % 0 0 - 2 %    Lymphocytes Relative 17 14 - 44 %    Monocytes Relative 6 4 - 12 %    Eosinophils Relative 1 0 - 6 %    Basophils Relative 0 0 - 1 %    Neutrophils Absolute 8 98 (H) 1 85 - 7 62 Thousands/µL    Immature Grans Absolute 0 03 0 00 - 0 20 Thousand/uL    Lymphocytes Absolute 1 99 0 60 - 4 47 Thousands/µL    Monocytes Absolute 0 75 0 17 - 1 22 Thousand/µL    Eosinophils Absolute 0 11 0 00 - 0 61 Thousand/µL    Basophils Absolute 0 05 0 00 - 0 10 Thousands/µL   Lipid panel    Collection Time: 12/27/19  6:57 AM   Result Value Ref Range    Cholesterol 158 50 - 200 mg/dL    Triglycerides 81 <=150 mg/dL    HDL, Direct 45 >=40 mg/dL    LDL Calculated 97 0 - 100 mg/dL    Non-HDL-Chol (CHOL-HDL) 113 mg/dl   Magnesium    Collection Time: 12/27/19  6:57 AM   Result Value Ref Range    Magnesium 2 1 1 6 - 2 6 mg/dL   Troponin I    Collection Time: 12/27/19  6:57 AM   Result Value Ref Range    Troponin I 0 20 (H) <=0 04 ng/mL   Comprehensive metabolic panel    Collection Time: 12/27/19  6:57 AM   Result Value Ref Range    Sodium 138 136 - 145 mmol/L    Potassium 3 0 (L) 3 5 - 5 3 mmol/L    Chloride 107 100 - 108 mmol/L    CO2 25 21 - 32 mmol/L    ANION GAP 6 4 - 13 mmol/L    BUN 11 5 - 25 mg/dL    Creatinine 0 99 0 60 - 1 30 mg/dL    Glucose 142 (H) 65 - 140 mg/dL    Calcium 8 9 8 3 - 10 1 mg/dL    AST 14 5 - 45 U/L    ALT 22 12 - 78 U/L    Alkaline Phosphatase 71 46 - 116 U/L    Total Protein 7 2 6 4 - 8 2 g/dL    Albumin 3 3 (L) 3 5 - 5 0 g/dL    Total Bilirubin 1 07 (H) 0 20 - 1 00 mg/dL    eGFR 81 ml/min/1 73sq m   POCT Blood Gas (CG8+)    Collection Time: 12/27/19  6:58 AM   Result Value Ref Range    ph, Brady ISTAT 7 471 (HH) 7 300 - 7 400    pCO2, Brady i-STAT 36 2 (L) 42 0 - 50 0 mm HG    pO2, Brady i-STAT 59 0 (H) 35 0 - 45 0 mm HG    BE, i-STAT 3 -2 - 3 mmol/L    HCO3, Brady i-STAT 26 4 24 0 - 30 0 mmol/L    CO2, i-STAT 28 21 - 32 mmol/L    O2 Sat, i-STAT 92 (H) 60 - 85 %    SODIUM, I-STAT 138 136 - 145 mmol/l    Potassium, i-STAT 4 6 3 5 - 5 3 mmol/L    Calcium, Ionized i-STAT 1 03 (L) 1 12 - 1 32 mmol/L    Hct, i-STAT 51 (H) 36 5 - 49 3 %    Hgb, i-STAT 17 3 (H) 12 0 - 17 0 g/dl    Glucose, i-STAT 145 (H) 65 - 140 mg/dl    Specimen Type VENOUS    POCT Chem 8+    Collection Time: 12/27/19  6:59 AM   Result Value Ref Range    SODIUM, I-STAT 137 136 - 145 mmol/l    Potassium, i-STAT 4 5 3 5 - 5 3 mmol/L    Chloride, istat 103 100 - 108 mmol/L    CO2, i-STAT 27 21 - 32 mmol/L    Anion Gap, i-STAT 13 4 - 13 mmol/L    Calcium, Ionized i-STAT 1 00 (L) 1 12 - 1 32 mmol/L    BUN, I-STAT 12 5 - 25 mg/dl    Creatinine, i-STAT 0 8 0 6 - 1 3 mg/dl    eGFR 96 ml/min/1 73sq m    Glucose, i-STAT 145 (H) 65 - 140 mg/dl    Hct, i-STAT 52 (H) 36 5 - 49 3 %    Hgb, i-STAT 17 7 (H) 12 0 - 17 0 g/dl    Specimen Type VENOUS      Imaging: I have personally reviewed pertinent reports

## 2019-12-27 NOTE — ED PROVIDER NOTES
History  Chief Complaint   Patient presents with    Chest Pain     Patient states he woke up this morning around 5 am with chest pain  States this feels worse than past MIs      74-year-old male with past medical history of coronary artery disease status post PCI, tobacco abuse, and bipolar disorder who is presenting with chest pain  Patient states that he woke up this morning with severe chest pain  He is not certain exactly what time this was but thinks that it might have been around 0530  Patient reports associated shortness of breath and nausea  He denies any diaphoresis or vomiting  Patient has no headache, vision changes, focal numbness or weakness, speech difficulty, abdominal pain, extremity pain, or any other complaints at this time  Patient was last admitted on November 6th and discharged on November 8th  During that admission, cardiac catheterization was performed and demonstrated nonobstructive plaque in the LAD  Site of prior stent placement was patent  There was minor nonobstructive plaque in the RCA  No lesions were seen in the circumflex  Most recent PCI was in August 13th; patient received PCI to the proximal LAD and angioplasty to the 1st diagonal   Patient reports compliance with all of his medications  He did not take his Plavix this morning but states he has been otherwise taking it regularly  Prior to Admission Medications   Prescriptions Last Dose Informant Patient Reported?  Taking?   aspirin 81 mg chewable tablet  Self No No   Sig: Chew 1 tablet (81 mg total) daily   atorvastatin (LIPITOR) 80 mg tablet   No No   Sig: Take 1 tablet (80 mg total) by mouth every evening   clopidogrel (PLAVIX) 75 mg tablet   No No   Sig: Take 1 tablet (75 mg total) by mouth daily   isosorbide mononitrate (IMDUR) 30 mg 24 hr tablet   No No   Sig: Take 1 tablet (30 mg total) by mouth daily   metoprolol tartrate (LOPRESSOR) 25 mg tablet   No No   Sig: Take 1 tablet (25 mg total) by mouth every 12 (twelve) hours   nitroglycerin (NITROSTAT) 0 4 mg SL tablet   No No   Sig: Place 1 tablet (0 4 mg total) under the tongue every 5 (five) minutes as needed for chest pain   ranolazine (RANEXA) 500 mg 12 hr tablet   No No   Sig: Take 1 tablet (500 mg total) by mouth 2 (two) times a day   topiramate (TOPAMAX) 100 mg tablet  Self Yes No   Sig: Take 100 mg by mouth 3 (three) times a day   ziprasidone (GEODON) 40 mg capsule  Self Yes No   Sig: Take 40 mg by mouth 2 (two) times a day      Facility-Administered Medications: None       Past Medical History:   Diagnosis Date    Bipolar 1 disorder ([facility] )     CAD (coronary artery disease)     Schizo-affective psychosis ([facility] )     Tobacco user        Past Surgical History:   Procedure Laterality Date    BACK SURGERY      CARDIAC SURGERY      stent placement    CHOLECYSTECTOMY      KNEE SURGERY Right        Family History   Problem Relation Age of Onset    Heart disease Mother     Cancer Mother     Heart disease Father     Cancer Father     Multiple sclerosis Sister     Cancer Sister      I have reviewed and agree with the history as documented  Social History     Tobacco Use    Smoking status: Heavy Tobacco Smoker     Packs/day: 1 50     Types: Cigarettes    Smokeless tobacco: Never Used   Substance Use Topics    Alcohol use: Not Currently     Frequency: Never    Drug use: Never        Review of Systems   Constitutional: Negative for diaphoresis, fever and unexpected weight change  HENT: Negative for congestion, rhinorrhea and sore throat  Eyes: Negative for pain, discharge and visual disturbance  Respiratory: Positive for shortness of breath  Negative for cough and wheezing  Cardiovascular: Positive for chest pain  Negative for palpitations and leg swelling  Gastrointestinal: Positive for nausea  Negative for abdominal pain, blood in stool, constipation, diarrhea and vomiting  Genitourinary: Negative for dysuria, flank pain and hematuria  Musculoskeletal: Negative for arthralgias and myalgias  Skin: Negative for rash and wound  Allergic/Immunologic: Negative for environmental allergies and food allergies  Neurological: Negative for dizziness, seizures, weakness and numbness  Hematological: Negative for adenopathy  Psychiatric/Behavioral: Negative for confusion and hallucinations  Physical Exam  ED Triage Vitals   Temperature Pulse Respirations Blood Pressure SpO2   12/27/19 0654 12/27/19 0647 12/27/19 0647 12/27/19 0654 12/27/19 0647   98 3 °F (36 8 °C) 77 20 116/71 97 %      Temp Source Heart Rate Source Patient Position - Orthostatic VS BP Location FiO2 (%)   12/27/19 0654 12/27/19 0647 12/27/19 0647 12/27/19 0647 --   Oral Monitor Lying Right arm       Pain Score       --                    Orthostatic Vital Signs  Vitals:    12/27/19 0647 12/27/19 0654 12/27/19 0844   BP:  116/71 101/63   Pulse: 77 74 61   Patient Position - Orthostatic VS: Lying Lying        Physical Exam   Constitutional: He is oriented to person, place, and time  He appears well-developed and well-nourished  HENT:   Head: Normocephalic and atraumatic  Right Ear: External ear normal    Left Ear: External ear normal    Nose: Nose normal    Eyes: Pupils are equal, round, and reactive to light  EOM are normal    Neck: Normal range of motion  Neck supple  Cardiovascular: Normal rate, regular rhythm and normal heart sounds  No murmur heard  Patient with strong, symmetric 2+ DP and radial pulses bilaterally  Pulmonary/Chest: Effort normal and breath sounds normal  No respiratory distress  He has no wheezes  He has no rales  Abdominal: Soft  Bowel sounds are normal  He exhibits no distension  There is no tenderness  There is no guarding  Musculoskeletal: Normal range of motion  He exhibits no deformity  Neurological: He is alert and oriented to person, place, and time  No gross motor deficits noted  Cranial nerves II-XII are intact   Speech is fluent without dysarthria or aphasia  Skin: Skin is warm and dry  Capillary refill takes less than 2 seconds  He is not diaphoretic  Psychiatric: He has a normal mood and affect  His behavior is normal    Nursing note and vitals reviewed        ED Medications  Medications   sodium chloride (PF) 0 9 % injection 3 mL (has no administration in time range)   amiodarone (CORDARONE) 900 mg in dextrose 5 % 500 mL infusion (1 mg/min Intravenous New Bag 12/27/19 0748)   nicotine (NICODERM CQ) 21 mg/24 hr TD 24 hr patch 1 patch (1 patch Transdermal Not Given 12/27/19 0901)   aspirin chewable tablet 81 mg (has no administration in time range)   enoxaparin (LOVENOX) subcutaneous injection 40 mg (has no administration in time range)   acetaminophen (TYLENOL) tablet 650 mg (has no administration in time range)   isosorbide mononitrate (IMDUR) 24 hr tablet 30 mg (has no administration in time range)   metoprolol tartrate (LOPRESSOR) tablet 25 mg (has no administration in time range)   nitroglycerin (NITROSTAT) SL tablet 0 4 mg (has no administration in time range)   topiramate (TOPAMAX) tablet 100 mg (has no administration in time range)   ziprasidone (GEODON) capsule 40 mg (has no administration in time range)   atorvastatin (LIPITOR) tablet 80 mg (has no administration in time range)   sodium chloride 0 9 % infusion (100 mL/hr Intravenous New Bag 12/27/19 0857)   ondansetron (ZOFRAN) injection 4 mg (has no administration in time range)   ticagrelor (BRILINTA) tablet 180 mg (180 mg Oral Given 12/27/19 0700)     And   ticagrelor (BRILINTA) tablet 90 mg (has no administration in time range)   bisacodyl (DULCOLAX) rectal suppository 10 mg (has no administration in time range)   senna-docusate sodium (SENOKOT S) 8 6-50 mg per tablet 1 tablet (has no administration in time range)   heparin (porcine) injection 4,000 Units (4,000 Units Intravenous Given 12/27/19 0701)   amiodarone 150 mg/3 mL injection (150 mg Intravenous Given 12/27/19 0719)   fentanyl citrate (PF) 100 MCG/2ML (50 mcg Intravenous Given 12/27/19 0743)   midazolam (VERSED) injection (2 mg Intravenous Given 12/27/19 0743)   lidocaine (PF) (XYLOCAINE-MPF) 1 % injection (1 mL Infiltration Given 12/27/19 0744)   nitroGLYcerin (TRIDIL) 50 mg in 250 mL infusion (premix) (200 mcg Other Given 12/27/19 0804)   nitroGLYcerin (TRIDIL) 50 mg in 250 mL infusion (premix) (200 mcg Intra-arterial Given 12/27/19 0747)   verapamil (ISOPTIN) injection (2 5 mg Intravenous Given 12/27/19 0747)   heparin (porcine) injection (6,000 Units Intravenous Given 12/27/19 0752)   fentanyl citrate (PF) 100 MCG/2ML (25 mcg Intravenous Given 12/27/19 0758)   midazolam (VERSED) injection (1 mg Intravenous Given 12/27/19 0759)   iohexol (OMNIPAQUE) 350 MG/ML injection (SINGLE-DOSE) (140 mL Intravenous Given 12/27/19 0816)       Diagnostic Studies  Results Reviewed     Procedure Component Value Units Date/Time    Platelet count [[id number]] Collected:  12/27/19 0913    Lab Status:  No result Specimen:  Blood from Arm, Left     POCT activated clotting time [[id number]]  (Abnormal) Collected:  12/27/19 0814    Lab Status:  Final result Specimen:  Arterial Updated:  12/27/19 0836     Activated Clotting Time, i-STAT 218 sec      Specimen Type ARTERIAL    Troponin I [[id number]]     Lab Status:  No result Specimen:  Blood     Basic metabolic panel [[id number]] Collected:  12/27/19 0736    Lab Status:  Final result Specimen:  Blood from Arm, Left Updated:  12/27/19 0804     Sodium 139 mmol/L      Potassium 3 5 mmol/L      Chloride 108 mmol/L      CO2 25 mmol/L      ANION GAP 6 mmol/L      BUN 10 mg/dL      Creatinine 0 97 mg/dL      Glucose 118 mg/dL      Calcium 8 7 mg/dL      eGFR 83 ml/min/1 73sq m     Narrative:       [place] guidelines for Chronic Kidney Disease (CKD):     Stage 1 with normal or high GFR (GFR > 90 mL/min/1 73 square meters)    Stage 2 Mild CKD (GFR = 60-89 mL/min/1 73 square meters)    Stage 3A Moderate CKD (GFR = 45-59 mL/min/1 73 square meters)    Stage 3B Moderate CKD (GFR = 30-44 mL/min/1 73 square meters)    Stage 4 Severe CKD (GFR = 15-29 mL/min/1 73 square meters)    Stage 5 End Stage CKD (GFR <15 mL/min/1 73 square meters)  Note: GFR calculation is accurate only with a steady state creatinine    Magnesium [[id number]]  (Normal) Collected:  12/27/19 0736    Lab Status:  Final result Specimen:  Blood from Arm, Left Updated:  12/27/19 0804     Magnesium 2 1 mg/dL     Troponin I [[id number]]     Lab Status:  No result Specimen:  Blood     Protime-INR [[id number]]  (Normal) Collected:  12/27/19 0657    Lab Status:  Final result Specimen:  Blood from Arm, Right Updated:  12/27/19 0745     Protime 13 9 seconds      INR 1 11    APTT [[id number]]  (Normal) Collected:  12/27/19 0657    Lab Status:  Final result Specimen:  Blood from Arm, Right Updated:  12/27/19 0745     PTT 26 seconds     Lipid panel [[id number]] Collected:  12/27/19 0657    Lab Status:  Final result Specimen:  Blood from Arm, Right Updated:  12/27/19 0720     Cholesterol 158 mg/dL      Triglycerides 81 mg/dL      HDL, Direct 45 mg/dL      LDL Calculated 97 mg/dL      Non-HDL-Chol (CHOL-HDL) 113 mg/dl     Magnesium [[id number]]  (Normal) Collected:  12/27/19 0657    Lab Status:  Final result Specimen:  Blood from Arm, Right Updated:  12/27/19 0720     Magnesium 2 1 mg/dL     Troponin I [[id number]]  (Abnormal) Collected:  12/27/19 0657    Lab Status:  Final result Specimen:  Blood from Arm, Right Updated:  12/27/19 0720     Troponin I 0 20 ng/mL     Comprehensive metabolic panel [[id number]]  (Abnormal) Collected:  12/27/19 0657    Lab Status:  Final result Specimen:  Blood from Arm, Right Updated:  12/27/19 0720     Sodium 138 mmol/L      Potassium 3 0 mmol/L      Chloride 107 mmol/L      CO2 25 mmol/L      ANION GAP 6 mmol/L      BUN 11 mg/dL      Creatinine 0 99 mg/dL      Glucose 142 mg/dL      Calcium 8 9 mg/dL AST 14 U/L      ALT 22 U/L      Alkaline Phosphatase 71 U/L      Total Protein 7 2 g/dL      Albumin 3 3 g/dL      Total Bilirubin 1 07 mg/dL      eGFR 81 ml/min/1 73sq m     Narrative:       [place] guidelines for Chronic Kidney Disease (CKD):     Stage 1 with normal or high GFR (GFR > 90 mL/min/1 73 square meters)    Stage 2 Mild CKD (GFR = 60-89 mL/min/1 73 square meters)    Stage 3A Moderate CKD (GFR = 45-59 mL/min/1 73 square meters)    Stage 3B Moderate CKD (GFR = 30-44 mL/min/1 73 square meters)    Stage 4 Severe CKD (GFR = 15-29 mL/min/1 73 square meters)    Stage 5 End Stage CKD (GFR <15 mL/min/1 73 square meters)  Note: GFR calculation is accurate only with a steady state creatinine    CBC and differential [[id number]]  (Abnormal) Collected:  12/27/19 0657    Lab Status:  Final result Specimen:  Blood from Arm, Right Updated:  12/27/19 0706     WBC 11 91 Thousand/uL      RBC 5 21 Million/uL      Hemoglobin 17 0 g/dL      Hematocrit 50 7 %      MCV 97 fL      MCH 32 6 pg      MCHC 33 5 g/dL      RDW 14 4 %      MPV 10 1 fL      Platelets 652 Thousands/uL      nRBC 0 /100 WBCs      Neutrophils Relative 76 %      Immat GRANS % 0 %      Lymphocytes Relative 17 %      Monocytes Relative 6 %      Eosinophils Relative 1 %      Basophils Relative 0 %      Neutrophils Absolute 8 98 Thousands/µL      Immature Grans Absolute 0 03 Thousand/uL      Lymphocytes Absolute 1 99 Thousands/µL      Monocytes Absolute 0 75 Thousand/µL      Eosinophils Absolute 0 11 Thousand/µL      Basophils Absolute 0 05 Thousands/µL     POCT Chem 8+ [[id number]]  (Abnormal) Collected:  12/27/19 0659    Lab Status:  Final result Specimen:  Venous Updated:  12/27/19 0702     SODIUM, I-STAT 137 mmol/l      Potassium, i-STAT 4 5 mmol/L      Chloride, istat 103 mmol/L      CO2, i-STAT 27 mmol/L      Anion Gap, i-STAT 13 mmol/L      Calcium, Ionized i-STAT 1 00 mmol/L      BUN, I-STAT 12 mg/dl      Creatinine, i-STAT 0 8 mg/dl      eGFR 96 ml/min/1 73sq m      Glucose, i-STAT 145 mg/dl      Hct, i-STAT 52 %      Hgb, i-STAT 17 7 g/dl      Specimen Type VENOUS    Narrative:       [employer] guidelines for Chronic Kidney Disease (CKD):     Stage 1 with normal or high GFR (GFR > 90 mL/min/1 73 square meters)    Stage 2 Mild CKD (GFR = 60-89 mL/min/1 73 square meters)    Stage 3A Moderate CKD (GFR = 45-59 mL/min/1 73 square meters)    Stage 3B Moderate CKD (GFR = 30-44 mL/min/1 73 square meters)    Stage 4 Severe CKD (GFR = 15-29 mL/min/1 73 square meters)    Stage 5 End Stage CKD (GFR <15 mL/min/1 73 square meters)  Note: GFR calculation is accurate only with a steady state creatinine    POCT Blood Gas (CG8+) [[id number]]  (Abnormal) Collected:  12/27/19 0658    Lab Status:  Final result Specimen:  Venous Updated:  12/27/19 0702     ph, Brady ISTAT 7 471     pCO2, Brady i-STAT 36 2 mm HG      pO2, Brady i-STAT 59 0 mm HG      BE, i-STAT 3 mmol/L      HCO3, Brady i-STAT 26 4 mmol/L      CO2, i-STAT 28 mmol/L      O2 Sat, i-STAT 92 %      SODIUM, I-STAT 138 mmol/l      Potassium, i-STAT 4 6 mmol/L      Calcium, Ionized i-STAT 1 03 mmol/L      Hct, i-STAT 51 %      Hgb, i-STAT 17 3 g/dl      Glucose, i-STAT 145 mg/dl      Specimen Type VENOUS                 No orders to display         Procedures  ECG 12 Lead Documentation Only  Date/Time: 12/27/2019 7:26 AM  Performed by: Oni Corrales MD  Authorized by: Oni Corrales MD     ECG reviewed by me, the ED Provider: yes    Patient location:  ED  Previous ECG:     Previous ECG:  Compared to current    Comparison ECG info:  November 7, 2018    Similarity:  Changes noted    Comparison to cardiac monitor: Yes    Interpretation:     Interpretation: abnormal    Rate:     ECG rate:  75    ECG rate assessment: normal    Rhythm:     Rhythm: sinus rhythm    Ectopy:     Ectopy: none    QRS:     QRS axis:  Left    QRS intervals:  Normal  Conduction: Conduction: normal    ST segments:     ST segments:  Abnormal    Elevation:  V1, V2 and V3    Depression:  V6  Comments:      MI alert called based on EKG  ED Course  ED Course as of Dec 27 0922   Fri Dec 27, 2019   0651 Called MI alert, EKG with anteroseptal ST elevation, most prominent in V1, V2  Slight depression V6       0653 Aspirin and nitroglycerin given pre-hospital        0703 Spoke with Dr Dorothy Biswas, Cardiology  They will come evaluate the patient        6288 Blood Pressure: 116/71   0704 SpO2: 94 %                               MDM  Number of Diagnoses or Management Options  Chest pain: new and requires workup  History of coronary artery disease: established and worsening  Nausea: new and does not require workup  ST elevation myocardial infarction involving left anterior descending (LAD) coronary artery [facility]): new and requires workup  Diagnosis management comments:     Patient presented with chest pain  Patient noted to have extensive cardiac history including prior STEMI with PCI, most recently in August 2019  On examination, the patient was ill appearing but nontoxic  He was hemodynamically stable  Patient was placed on monitor/defibrillator  EMS EKG was reviewed and demonstrated ST segment elevation in V1, V2, and V3  ED EKG confirmed these findings, with ST segment depression seen in V6 in addition to the above findings  MI alert was called  Medications were administered per protocol  Cardiology evaluated the patient and took him to the catheterization lab for treatment of his STEMI         Amount and/or Complexity of Data Reviewed  Clinical lab tests: ordered and reviewed  Decide to obtain previous medical records or to obtain history from someone other than the patient: yes  Obtain history from someone other than the patient: yes  Review and summarize past medical records: yes  Discuss the patient with other providers: yes  Independent visualization of images, tracings, or specimens: yes    Risk of Complications, Morbidity, and/or Mortality  Presenting problems: high  Diagnostic procedures: minimal  Management options: low    Patient Progress  Patient progress: stable        Disposition  Final diagnoses:   ST elevation myocardial infarction involving left anterior descending (LAD) coronary artery (HCC)   Chest pain   History of coronary artery disease   Nausea     Time reflects when diagnosis was documented in both MDM as applicable and the Disposition within this note     Time User Action Codes Description Comment    12/27/2019  7:53 AM Jhon Gray Add [I21 3] STEMI (ST elevation myocardial infarction) ([facility] )     12/27/2019  9:18 AM Pinto Leigh Add [I21 02] ST elevation myocardial infarction involving left anterior descending (LAD) coronary artery ([facility] )     12/27/2019  9:18 AM Pinto Leigh Add [R07 9] Chest pain     12/27/2019  9:19 AM Pinto Leigh Add [Z86 79] History of coronary artery disease     12/27/2019  9:19 AM Pinto Leigh Add [R11 0] Nausea       ED Disposition     ED Disposition Condition Date/Time Comment    Send to Cath Lab  Fri Dec 27, 2019  9:17 AM MI alert called; Cardiology to take patient to catheterization lab for treatment of MI         Follow-up Information    None         Current Discharge Medication List      CONTINUE these medications which have NOT CHANGED    Details   aspirin 81 mg chewable tablet Chew 1 tablet (81 mg total) daily  Qty: 90 tablet, Refills: 3    Associated Diagnoses: MI, acute, non ST segment elevation (HCC)      atorvastatin (LIPITOR) 80 mg tablet Take 1 tablet (80 mg total) by mouth every evening  Qty: 90 tablet, Refills: 3    Associated Diagnoses: MI, acute, non ST segment elevation (HCC)      clopidogrel (PLAVIX) 75 mg tablet Take 1 tablet (75 mg total) by mouth daily  Qty: 90 tablet, Refills: 3    Associated Diagnoses: MI, acute, non ST segment elevation (HCC)      isosorbide mononitrate (IMDUR) 30 mg 24 hr tablet Take 1 tablet (30 mg total) by mouth daily  Qty: 90 tablet, Refills: 3    Associated Diagnoses: Coronary artery spasm (HCC)      metoprolol tartrate (LOPRESSOR) 25 mg tablet Take 1 tablet (25 mg total) by mouth every 12 (twelve) hours  Qty: 180 tablet, Refills: 3    Associated Diagnoses: Chronic stable angina (HCC)      nitroglycerin (NITROSTAT) 0 4 mg SL tablet Place 1 tablet (0 4 mg total) under the tongue every 5 (five) minutes as needed for chest pain  Qty: 30 tablet, Refills: 3    Associated Diagnoses: MI, acute, non ST segment elevation (HCC)      ranolazine (RANEXA) 500 mg 12 hr tablet Take 1 tablet (500 mg total) by mouth 2 (two) times a day  Qty: 180 tablet, Refills: 3    Associated Diagnoses: Type 2 myocardial infarction without ST elevation ([address] ); Coronary artery spasm ([address] ); MI, acute, non ST segment elevation ([address] ); Chronic stable angina (HCC)      topiramate (TOPAMAX) 100 mg tablet Take 100 mg by mouth 3 (three) times a day      ziprasidone (GEODON) 40 mg capsule Take 40 mg by mouth 2 (two) times a day           No discharge procedures on file  ED Provider  Attending physically available and evaluated Rox Guerrero I managed the patient along with the ED Attending      Electronically Signed by         Luis Ignacio MD  12/27/19 0186

## 2019-12-27 NOTE — SOCIAL WORK
Met with the patient to complete assessment and explain role of CM  He loives alone in an apartment in [facility]  There are no ISH and building has elevator  Patient is independent with care and drives  He has no HHC and no DME  Patient has no Advance Directive and his primary contact is SO, Lavinia Mendoza  The patient has a community health worker who assists with community referrals  He receives services from [employer] and is getting free furniture from a [facility]  Patient was referred to the [facility] program through [facility] for volunteer homemaker services  PCP is [facility], Dr Flaca Varela  Patient has prescription coverage and uses [employer], [address],  [address] [facility]  Patient has bipolar disorder and receives community [place] [address] services from [facility], [address] [place] [address], [phone]  He sees a therapist weekly  And just started with a psychiatrist  Rafal Askew called them at patient request to cancel appointment for today  Left message on [employer] to report hospitalization  Patient reports he will need Lyft transport back home  CM reviewed d/c planning process including the following: identifying help at home, patient preference for d/c planning needs, Discharge Lounge, [facility] to [facility] program, availability of treatment team to discuss questions or concerns patient and/or family may have regarding understanding medications and recognizing signs and symptoms once discharged  CM also encouraged patient to follow up with all recommended appointments after discharge  Patient advised of importance for patient and family to participate in managing patients medical well being  Patient/caregiver received discharge checklist  Content reviewed  Patient/caregiver encouraged to participate in discharge plan of care prior to discharge home

## 2019-12-28 LAB
ALBUMIN SERPL BCP-MCNC: 2.9 G/DL (ref 3.5–5)
ALP SERPL-CCNC: 67 U/L (ref 46–116)
ALT SERPL W P-5'-P-CCNC: 33 U/L (ref 12–78)
ANION GAP SERPL CALCULATED.3IONS-SCNC: 4 MMOL/L (ref 4–13)
AST SERPL W P-5'-P-CCNC: 57 U/L (ref 5–45)
ATRIAL RATE: 54 BPM
ATRIAL RATE: 60 BPM
BILIRUB SERPL-MCNC: 0.72 MG/DL (ref 0.2–1)
BUN SERPL-MCNC: 11 MG/DL (ref 5–25)
CALCIUM SERPL-MCNC: 8.7 MG/DL (ref 8.3–10.1)
CHLORIDE SERPL-SCNC: 110 MMOL/L (ref 100–108)
CHOLEST SERPL-MCNC: 136 MG/DL (ref 50–200)
CO2 SERPL-SCNC: 26 MMOL/L (ref 21–32)
CREAT SERPL-MCNC: 0.9 MG/DL (ref 0.6–1.3)
ERYTHROCYTE [DISTWIDTH] IN BLOOD BY AUTOMATED COUNT: 14.6 % (ref 11.6–15.1)
GFR SERPL CREATININE-BSD FRML MDRD: 91 ML/MIN/1.73SQ M
GLUCOSE SERPL-MCNC: 113 MG/DL (ref 65–140)
HCT VFR BLD AUTO: 49.7 % (ref 36.5–49.3)
HDLC SERPL-MCNC: 40 MG/DL
HGB BLD-MCNC: 16.6 G/DL (ref 12–17)
LDLC SERPL CALC-MCNC: 80 MG/DL (ref 0–100)
MAGNESIUM SERPL-MCNC: 2.3 MG/DL (ref 1.6–2.6)
MCH RBC QN AUTO: 33.2 PG (ref 26.8–34.3)
MCHC RBC AUTO-ENTMCNC: 33.4 G/DL (ref 31.4–37.4)
MCV RBC AUTO: 99 FL (ref 82–98)
P AXIS: 70 DEGREES
P AXIS: 72 DEGREES
PLATELET # BLD AUTO: 186 THOUSANDS/UL (ref 149–390)
PMV BLD AUTO: 10.6 FL (ref 8.9–12.7)
POTASSIUM SERPL-SCNC: 3.6 MMOL/L (ref 3.5–5.3)
PR INTERVAL: 138 MS
PR INTERVAL: 142 MS
PROT SERPL-MCNC: 6.6 G/DL (ref 6.4–8.2)
QRS AXIS: -11 DEGREES
QRS AXIS: -28 DEGREES
QRSD INTERVAL: 79 MS
QRSD INTERVAL: 79 MS
QT INTERVAL: 463 MS
QT INTERVAL: 467 MS
QTC INTERVAL: 439 MS
QTC INTERVAL: 467 MS
RBC # BLD AUTO: 5 MILLION/UL (ref 3.88–5.62)
SODIUM SERPL-SCNC: 140 MMOL/L (ref 136–145)
T WAVE AXIS: -29 DEGREES
T WAVE AXIS: -31 DEGREES
TRIGL SERPL-MCNC: 78 MG/DL
TROPONIN I SERPL-MCNC: 7.08 NG/ML
VENTRICULAR RATE: 54 BPM
VENTRICULAR RATE: 60 BPM
WBC # BLD AUTO: 12.65 THOUSAND/UL (ref 4.31–10.16)

## 2019-12-28 PROCEDURE — G0008 ADMIN INFLUENZA VIRUS VAC: HCPCS | Performed by: INTERNAL MEDICINE

## 2019-12-28 PROCEDURE — 85027 COMPLETE CBC AUTOMATED: CPT | Performed by: INTERNAL MEDICINE

## 2019-12-28 PROCEDURE — 84484 ASSAY OF TROPONIN QUANT: CPT | Performed by: INTERNAL MEDICINE

## 2019-12-28 PROCEDURE — 99232 SBSQ HOSP IP/OBS MODERATE 35: CPT | Performed by: INTERNAL MEDICINE

## 2019-12-28 PROCEDURE — 90682 RIV4 VACC RECOMBINANT DNA IM: CPT | Performed by: INTERNAL MEDICINE

## 2019-12-28 PROCEDURE — 80061 LIPID PANEL: CPT | Performed by: INTERNAL MEDICINE

## 2019-12-28 PROCEDURE — 93010 ELECTROCARDIOGRAM REPORT: CPT | Performed by: INTERNAL MEDICINE

## 2019-12-28 PROCEDURE — 80053 COMPREHEN METABOLIC PANEL: CPT | Performed by: INTERNAL MEDICINE

## 2019-12-28 PROCEDURE — 99232 SBSQ HOSP IP/OBS MODERATE 35: CPT | Performed by: ANESTHESIOLOGY

## 2019-12-28 PROCEDURE — 93005 ELECTROCARDIOGRAM TRACING: CPT

## 2019-12-28 PROCEDURE — 83735 ASSAY OF MAGNESIUM: CPT | Performed by: INTERNAL MEDICINE

## 2019-12-28 RX ORDER — METOPROLOL SUCCINATE 25 MG/1
25 TABLET, EXTENDED RELEASE ORAL DAILY
Status: DISCONTINUED | OUTPATIENT
Start: 2019-12-28 | End: 2019-12-29 | Stop reason: HOSPADM

## 2019-12-28 RX ORDER — RANOLAZINE 500 MG/1
500 TABLET, EXTENDED RELEASE ORAL 2 TIMES DAILY
Status: DISCONTINUED | OUTPATIENT
Start: 2019-12-28 | End: 2019-12-29 | Stop reason: HOSPADM

## 2019-12-28 RX ORDER — POTASSIUM CHLORIDE 20 MEQ/1
40 TABLET, EXTENDED RELEASE ORAL ONCE
Status: COMPLETED | OUTPATIENT
Start: 2019-12-28 | End: 2019-12-28

## 2019-12-28 RX ORDER — FUROSEMIDE 10 MG/ML
20 INJECTION INTRAMUSCULAR; INTRAVENOUS ONCE
Status: COMPLETED | OUTPATIENT
Start: 2019-12-28 | End: 2019-12-28

## 2019-12-28 RX ADMIN — TOPIRAMATE 100 MG: 100 TABLET, FILM COATED ORAL at 17:12

## 2019-12-28 RX ADMIN — ATORVASTATIN CALCIUM 80 MG: 80 TABLET, FILM COATED ORAL at 17:12

## 2019-12-28 RX ADMIN — TICAGRELOR 90 MG: 90 TABLET ORAL at 20:57

## 2019-12-28 RX ADMIN — INFLUENZA A VIRUS A/BRISBANE/02/2018 (H1N1) RECOMBINANT HEMAGGLUTININ ANTIGEN, INFLUENZA A VIRUS A/KANSAS/14/2017 (H3N2) RECOMBINANT HEMAGGLUTININ ANTIGEN, INFLUENZA B VIRUS B/PHUKET/3073/2013 RECOMBINANT HEMAGGLUTININ ANTIGEN, AND INFLUENZA B VIRUS B/MARYLAND/15/2016 RECOMBINANT HEMAGGLUTININ ANTIGEN 0.5 ML: 45; 45; 45; 45 INJECTION INTRAMUSCULAR at 09:09

## 2019-12-28 RX ADMIN — TOPIRAMATE 100 MG: 100 TABLET, FILM COATED ORAL at 09:10

## 2019-12-28 RX ADMIN — POTASSIUM CHLORIDE 40 MEQ: 1500 TABLET, EXTENDED RELEASE ORAL at 09:07

## 2019-12-28 RX ADMIN — TICAGRELOR 90 MG: 90 TABLET ORAL at 09:07

## 2019-12-28 RX ADMIN — FUROSEMIDE 20 MG: 10 INJECTION, SOLUTION INTRAMUSCULAR; INTRAVENOUS at 09:08

## 2019-12-28 RX ADMIN — ENOXAPARIN SODIUM 40 MG: 40 INJECTION SUBCUTANEOUS at 09:08

## 2019-12-28 RX ADMIN — ZIPRASIDONE HCL 40 MG: 40 CAPSULE ORAL at 09:10

## 2019-12-28 RX ADMIN — RANOLAZINE 500 MG: 500 TABLET, FILM COATED, EXTENDED RELEASE ORAL at 17:12

## 2019-12-28 RX ADMIN — METOPROLOL SUCCINATE 25 MG: 25 TABLET, EXTENDED RELEASE ORAL at 09:08

## 2019-12-28 RX ADMIN — ASPIRIN 81 MG 81 MG: 81 TABLET ORAL at 09:07

## 2019-12-28 NOTE — PROGRESS NOTES
Pt 's mood is flat compared to yesterday, described as calmer by SO on phone  Dr Timothy Boykin aware during morning assessment of overnight and present lethargy  Questioned pt on frequency of taking Geodon at home prior to admission based on change in mood and observed lethargy yesterday evening and today compared to admission yesterday morning  Pt is oriented x 4 and states he has not been taking Geodon psych meds leading up to admission and it has caused lethargy in past   Paged Card with update  No new orders will continue to monitor

## 2019-12-28 NOTE — PROGRESS NOTES
Dr Kristie Barth and I in to see pt  Pt c/o difficulty breathing without details when asked to describe  SPO2 97% RA  Fine crackles bibasilar  RR 12-20  Provided PRN 2L O2  Per Dr Kristie Barth reassured pt and changed dose and frequency of cardiac meds awaiting order for lasix  Will continue to monitor closely and provide emotional support

## 2019-12-28 NOTE — SOCIAL WORK
Cm reviewed pt during care coordination rounds  Per medical team, pt is not medically stable to dc  Per Dr Nikko Gaston, pt may be dc with new medication Brilinta 90mg  Cm contacted pt preferred pharmacy for price check  Awaiting pharmacy call back  Cm will follow

## 2019-12-28 NOTE — PROGRESS NOTES
Progress Note - Cardiology   Lupillo Staff Yolanda 60 y o  male MRN: [id number]  Unit/Bed#: [facility] 515-01 Encounter: [id number]    Assessment:  Principal Problem:    Acute ST elevation myocardial infarction (STEMI) involving left anterior descending (LAD) coronary artery (HCC)  Active Problems:    Bipolar 1 disorder (HCC)    Tobacco user    COPD (chronic obstructive pulmonary disease) ([address] )    In stent thrombosis in the setting of not being compliant with his plavix  Not truly failure of Plavix, but now two layers of stent in LAD  Overall preserved LV function, RWMA noted in LAD territory  Mild pulmonary hypertension  Smoker    Plan:  Some rales, shortness of breath, give a dose of IV lasix  Continue DAPT  Check cost of Brilinta  If too expensive, change back to Plavix, as we can't call this failure-- he wasn't taking it regularly  Change to Toprol XL 25 mg, was bradycardic with 25mg twice a day  Stop Imdur  Uses viagra intermittently, and hopefully will have no angina at this point  Continue his psychiatric medications  Transfer to med/surg floor  Outpatient cardiac rehab  Follows with Dr Sarah Diaz  Hopeful for discharge home tomorrow  Subjective/Objective     Subjective:  He was bradycardic overnight, amiodarone drip was stopped this morning  His metoprolol last night was held  He was reported to be lethargic, so a head CT was performed    He reports some shortness of breath    Objective:  Vitals: BP 94/52   Pulse (!) 50   Temp 97 7 °F (36 5 °C) (Oral)   Resp 17   Ht 5' 9" (1 753 m)   Wt 101 kg (222 lb 9 6 oz)   SpO2 99%   BMI 32 87 kg/m²   Vitals:    12/27/19 0850 12/28/19 0600   Weight: 102 kg (224 lb 10 4 oz) 101 kg (222 lb 9 6 oz)     Orthostatic Blood Pressures      Most Recent Value   Blood Pressure  94/52 filed at 12/28/2019 0700   Patient Position - Orthostatic VS  Lying filed at 12/27/2019 2004          Intake/Output Summary (Last 24 hours) at 12/28/2019 8600  Last data filed at 12/28/2019 0701  Gross per 24 hour   Intake 1546 84 ml   Output 1475 ml   Net 71 84 ml     Physical Exam:   General appearance: alert and in no acute distress  Head: Normocephalic, without obvious abnormality, atraumatic  Neck: no carotid bruit, no JVD and supple, symmetrical, trachea midline  Lungs: Mild bibasilar rales  Heart: S1, S2 normal and no S3 or S4  No murmurs  Abdomen: soft, non-tender; bowel sounds normal; no masses,  no organomegaly  Extremities: extremities normal, atraumatic, no cyanosis or edema  Pulses: 2+ and symmetric bilaterally  Skin: Skin color, texture, turgor normal  No rashes or lesions  Neurologic: Grossly normal  Alert and oriented      Medications:    Current Facility-Administered Medications:     acetaminophen (TYLENOL) tablet 650 mg, 650 mg, Oral, Q6H PRN, Molina Warren MD    aspirin chewable tablet 81 mg, 81 mg, Oral, Daily, Molina Warren MD    atorvastatin (LIPITOR) tablet 80 mg, 80 mg, Oral, QPM, Jhon Burciaga MD, 80 mg at 12/27/19 1711    bisacodyl (DULCOLAX) rectal suppository 10 mg, 10 mg, Rectal, Daily PRN, Amadou Esteves PA-C    enoxaparin (LOVENOX) subcutaneous injection 40 mg, 40 mg, Subcutaneous, Daily, Molina Warren MD    furosemide (LASIX) injection 20 mg, 20 mg, Intravenous, Once, Deepa Espana MD    influenza vaccine, recombinant, quadrivalent (FLUBLOK) IM injection 0 5 mL, 0 5 mL, Intramuscular, Once, Devaughn Grubbs MD    metoprolol succinate (TOPROL-XL) 24 hr tablet 25 mg, 25 mg, Oral, Daily, Deepa Espana MD    nicotine (NICODERM CQ) 21 mg/24 hr TD 24 hr patch 1 patch, 1 patch, Transdermal, Daily, Molina Warren MD    nitroglycerin (NITROSTAT) SL tablet 0 4 mg, 0 4 mg, Sublingual, Q5 Min PRN, Jhon Burciaga MD    ondansetron (ZOFRAN) injection 4 mg, 4 mg, Intravenous, Q6H PRN, Molina Warren MD    potassium chloride (K-DUR,KLOR-CON) CR tablet 40 mEq, 40 mEq, Oral, Once, Jagjit Esteves PA-C    senna-docusate sodium (SENOKOT S) 8 6-50 mg per tablet 1 tablet, 1 tablet, Oral, HS, Tiffanie Tapia PA-C, 1 tablet at 12/27/19 2245    Insert peripheral IV, , , Once **AND** sodium chloride (PF) 0 9 % injection 3 mL, 3 mL, Intravenous, PRN, Vinay Duarte MD    [COMPLETED] ticagrelor [facility]) tablet 180 mg, 180 mg, Oral, Once, 180 mg at 12/27/19 0700 **AND** ticagrelor (BRILINTA) tablet 90 mg, 90 mg, Oral, Q12H [address], Jhon Burciaga MD, 90 mg at 12/27/19 2245    topiramate (TOPAMAX) tablet 100 mg, 100 mg, Oral, BID, Jhon Burciaga MD, 100 mg at 12/27/19 1713    ziprasidone (GEODON) capsule 40 mg, 40 mg, Oral, BID, Suma Perdue MD, 40 mg at 12/27/19 2054    Lab Results:  Results from last 7 days   Lab Units 12/28/19  0543 12/27/19  1610 12/27/19  1250   TROPONIN I ng/mL 7 08* 7 97* 5 01*     Results from last 7 days   Lab Units 12/28/19  0543 12/27/19  0912 12/27/19  0659  12/27/19  0657   WBC Thousand/uL 12 65* 12 71*  --   --  11 91*   HEMOGLOBIN g/dL 16 6 15 9  --   --  17 0   I STAT HEMOGLOBIN g/dl  --   --  17 7*   < >  --    HEMATOCRIT % 49 7* 47 9  --   --  50 7*   HEMATOCRIT, ISTAT %  --   --  52*   < >  --    PLATELETS Thousands/uL 186 183  --   --  214    < > = values in this interval not displayed       Results from last 7 days   Lab Units 12/28/19  0543 12/27/19  0657   TRIGLYCERIDES mg/dL 78 81   HDL mg/dL 40 45     Results from last 7 days   Lab Units 12/28/19  0543 12/27/19  0912 12/27/19  0736 12/27/19  0659 12/27/19  0658  12/27/19  0657   POTASSIUM mmol/L 3 6 4 0 3 5  --   --   --  3 0*   CHLORIDE mmol/L 110* 108 108  --   --   --  107   CO2 mmol/L 26 26 25  --   --   --  25   CO2, I-STAT mmol/L  --   --   --  27 28   < >  --    BUN mg/dL 11 9 10  --   --   --  11   CREATININE mg/dL 0 90 0 88 0 97  --   --   --  0 99   CALCIUM mg/dL 8 7 8 2* 8 7  --   --   --  8 9   ALK PHOS U/L 67  --   --   --   --   --  71   ALT U/L 33  --   --   --   --   --  22   AST U/L 57*  --   --   --   --   --  14   GLUCOSE, ISTAT mg/dl  --   --   -- 145* 145*  --   --     < > = values in this interval not displayed  Results from last 7 days   Lab Units 12/27/19  0657   INR  1 11   PTT seconds 26     Results from last 7 days   Lab Units 12/28/19  0543 12/27/19  0912 12/27/19  0736   MAGNESIUM mg/dL 2 3 2 0 2 1       Telemetry: Personally reviewed  Sinus rhythm/bradycardia    Echo:  Systolic function was normal  Ejection fraction was estimated to be 55 %  There was hypokinesis of the apical anterior, apical inferior, and apical septal wall(s)  There was mild concentric hypertrophy      MITRAL VALVE:  There was trace to mild regurgitation      AORTIC VALVE:  There was mild regurgitation      TRICUSPID VALVE:  There was mild regurgitation  Estimated peak PA pressure was 41 mmHg  The findings suggest mild pulmonary hypertension      Cardiac Cath:  CORONARY CIRCULATION:  Left main: Normal   Mid LAD: There was a 90 % stenosis at the site of a prior stent  Circumflex: Angiography showed minor luminal irregularities  RCA: Angiography showed minor luminal irregularities      1ST LESION INTERVENTIONS:  A drug-eluting stent procedure was performed on the 99 % lesion in the mid LAD  Following intervention there was a 0 % residual stenosis    A Xience Felipa Rx 3 5 x 18mm drug-eluting stent was placed across the lesion and deployed at a maximum inflation pressure of 16 juliann

## 2019-12-28 NOTE — PROGRESS NOTES
Daily Progress Note - Critical Care   Serenity Guerrero 60 y o  male MRN: [id number]  Unit/Bed#: [facility] 515-01 Encounter: [id number]        ----------------------------------------------------------------------------------------  HPI/24hr events:  Anterior STEMI status post PCI with CHARBEL to LAD postop day 1 also with VFib arrest status post defibrillation x4 with ROSC   Patient remained sleepy and drowsy but was able to answer all questions also appear to have a deviated gaze, elected to do a CT of the head per patient he never had a stroke but he appears to have 2 areas on the left than the right with old strokes with encephalomalacia       ---------------------------------------------------------------------------------------  SUBJECTIVE  Patient has no complaint    Review of Systems  Review of systems was reviewed and negative unless stated above in HPI/24-hour events   ---------------------------------------------------------------------------------------  Assessment and Plan:    Neuro:    Diagnosis:  Bipolar disorder  o Plan:  Continue home Topamax and Geodon   Diagnosis:  Pain control with Tylenol p r n   o Plan:  Delirium precautions  o CAM ICU   o Regulate sleep and wake cycles  o  neuro exam q 4 hours      CV:    Diagnosis:  Anterior STEMI status post PCI with CHARBEL to LAD VFib arrest status post defibrillation x4 with ROSC  o Plan:  Aspirin 81 mg daily  o Lipitor 80 mg daily  o Brilinta 90 mg Q 12  o Lopressor ordered at 25 mg b i d  Unable to provide patient with Lopressor blood pressure was in the 90 and heart rate was less than 50     o Patient continues on amiodarone drip GTT at 0 5  o Follow-up lipid panel  o Echo shows an EF 55% with hypokinesis of the apical anterior inferior and septal wall also suggest mild pulmonary hypertension  o Rhythm is sinus bradycardia with occasional PVC  o Map goal is greater than 65  o Cardiology is following  o Patient is on Imdur 30 mg daily consider adjusting due to low BP      Pulm:   Diagnosis:  COPD and nicotine dependence  o Plan:  Continue nicotine patch  o Keep goal SpO2 greater than 90%  o Pulmonary hygiene  o Incentive spirometer        GI:    Diagnosis:  No acute issues   Cardiac diet        :    Diagnosis:  No acute issues   Trend urine output and creatinine      F/E/N:    Plan:  Replete electrolytes of goals K greater than 4 magnesium greater than 2 and phosphorus greater than 3      Heme/Onc:    Diagnosis:  No acute issues      Endo:    Diagnosis:  No acute issues        ID:    Diagnosis:  No acute issues        MSK/Skin:    Diagnosis:  Out of bed as tolerated    Disposition: Critical Care to sign off   Code Status: Level 1 - Full Code  ---------------------------------------------------------------------------------------  ICU CORE MEASURES    Prophylaxis   VTE Pharmacologic Prophylaxis: Heparin  VTE Mechanical Prophylaxis: sequential compression device  Stress Ulcer Prophylaxis: Pantoprazole PO    ABCDE Protocol (if indicated)  Plan to perform spontaneous awakening trial today? Not applicable  Plan to perform spontaneous breathing trial today? Not applicable  Obvious barriers to extubation? Not applicable  CAM-ICU: Negative    Invasive Devices Review  Invasive Devices     Peripheral Intravenous Line            Peripheral IV 12/27/19 Left Arm less than 1 day    Peripheral IV 12/27/19 Left Arm less than 1 day    Peripheral IV 12/27/19 Right Arm less than 1 day              Can any invasive devices be discontinued today? Not applicable  ---------------------------------------------------------------------------------------  OBJECTIVE    Physical Exam   Constitutional: He is oriented to person, place, and time  He appears well-developed and well-nourished  HENT:   Head: Normocephalic and atraumatic  Eyes: Pupils are equal, round, and reactive to light  EOM are normal    Neck: Normal range of motion  Neck supple     Cardiovascular: Normal rate and regular rhythm  Pulmonary/Chest: Effort normal and breath sounds normal    Abdominal: Soft  Bowel sounds are normal  He exhibits no distension  There is no tenderness  There is no guarding  Genitourinary: Penis normal    Musculoskeletal: Normal range of motion  Neurological: He is oriented to person, place, and time  Patient is lethargic, follows commands and gives all appropriate answer but is hard to arouse does not necessarily always open his eyes  Skin: Skin is warm and dry  Capillary refill takes less than 2 seconds  Psychiatric: He has a normal mood and affect  His behavior is normal    Nursing note and vitals reviewed  Vitals   Vitals:    19 2302 19 0002 19 0105 19 0205   BP: 99/54 101/63 99/64 92/57   BP Location:       Pulse: (!) 52 (!) 52 (!) 48 (!) 48   Resp: 14 13 (!) 10 18   Temp:       TempSrc:       SpO2: 94% 94% 98% 98%   Weight:       Height:         Temp (24hrs), Av 1 °F (36 7 °C), Min:97 7 °F (36 5 °C), Max:98 6 °F (37 °C)  Current: Temperature: 97 7 °F (36 5 °C)  HR: 47-52  BP: 96-64  RR: 14  SpO2: 98 %    Invasive/non-invasive ventilation settings   Respiratory    Lab Data (Last 4 hours)    None         O2/Vent Data (Last 4 hours)    None                Height and Weights   Height: 5' 9" (175 3 cm)  IBW: 70 7 kg  Body mass index is 33 17 kg/m²  Weight (last 2 days)     Date/Time   Weight    19 0850   102 (224 65)    19 0844   102 (224 65)    19 0647   103 (226 63)                Intake and Output  I/O        0701 -  0700  07 -  0700    P  O   640    I V  (mL/kg)  567 4 (5 6)    IV Piggyback  100    Total Intake(mL/kg)  1307 4 (12 8)    Urine (mL/kg/hr)  875 (0 4)    Emesis/NG output  0    Stool  0    Total Output  875    Net  +432 4          Unmeasured Stool Occurrence  0 x            Nutrition       Diet Orders   (From admission, onward)             Start     Ordered    19 0845  Diet Cardiac  Diet effective now Question Answer Comment   Diet Type Cardiac    RD to adjust diet per protocol?  Yes        12/27/19 0844                  Laboratory and Diagnostics:  Results from last 7 days   Lab Units 12/27/19  0912 12/27/19  0659 12/27/19  0658 12/27/19  0657   WBC Thousand/uL 12 71*  --   --  11 91*   HEMOGLOBIN g/dL 15 9  --   --  17 0   I STAT HEMOGLOBIN g/dl  --  17 7* 17 3*  --    HEMATOCRIT % 47 9  --   --  50 7*   HEMATOCRIT, ISTAT %  --  52* 51*  --    PLATELETS Thousands/uL 183  --   --  214   NEUTROS PCT % 78*  --   --  76*   MONOS PCT % 5  --   --  6     Results from last 7 days   Lab Units 12/27/19  0912 12/27/19  0736 12/27/19  0659 12/27/19  0658 12/27/19  0657   SODIUM mmol/L 138 139  --   --  138   POTASSIUM mmol/L 4 0 3 5  --   --  3 0*   CHLORIDE mmol/L 108 108  --   --  107   CO2 mmol/L 26 25  --   --  25   CO2, I-STAT mmol/L  --   --  27 28  --    AGAP mmol/L  --   --  13  --   --    ANION GAP mmol/L 4 6  --   --  6   BUN mg/dL 9 10  --   --  11   CREATININE mg/dL 0 88 0 97  --   --  0 99   CALCIUM mg/dL 8 2* 8 7  --   --  8 9   GLUCOSE RANDOM mg/dL 111 118  --   --  142*   ALT U/L  --   --   --   --  22   AST U/L  --   --   --   --  14   ALK PHOS U/L  --   --   --   --  71   ALBUMIN g/dL  --   --   --   --  3 3*   TOTAL BILIRUBIN mg/dL  --   --   --   --  1 07*     Results from last 7 days   Lab Units 12/27/19  0912 12/27/19  0736 12/27/19  0657   MAGNESIUM mg/dL 2 0 2 1 2 1      Results from last 7 days   Lab Units 12/27/19  0657   INR  1 11   PTT seconds 26      Results from last 7 days   Lab Units 12/27/19  1610 12/27/19  1250 12/27/19  0657   TROPONIN I ng/mL 7 97* 5 01* 0 20*         ABG:    VBG:          Micro        EKG:  Sinus bradycardia    Active Medications  Scheduled Meds:    Current Facility-Administered Medications:  acetaminophen 650 mg Oral Q6H PRN Raheem Membreno MD   aspirin 81 mg Oral Daily Jhon Burciaga MD   atorvastatin 80 mg Oral QPM Jhon Burciaga MD   bisacodyl 10 mg Rectal Daily PRN Phil Esteves PA-C   enoxaparin 40 mg Subcutaneous Daily Kan Carrillo MD   influenza vaccine 0 5 mL Intramuscular Once Felicia Cervantes MD   isosorbide mononitrate 30 mg Oral Daily Jhon Burciaga MD   metoprolol tartrate 25 mg Oral Q12H [address] Kan Carrillo MD   nicotine 1 patch Transdermal Daily Jhon Burciaga MD   nitroglycerin 0 4 mg Sublingual Q5 Min PRN Jhon Burciaga MD   ondansetron 4 mg Intravenous Q6H PRN Kan Carrillo MD   senna-docusate sodium 1 tablet Oral HS Phil Esteves PA-C   sodium chloride (PF) 3 mL Intravenous PRN Steffanie Maguire MD   ticagrelor 90 mg Oral Q12H [address] Kan Carrillo MD   topiramate 100 mg Oral BID Kan Carrillo MD   ziprasidone 40 mg Oral BID Kan Carrillo MD     Continuous Infusions:     PRN Meds:     acetaminophen 650 mg Q6H PRN   bisacodyl 10 mg Daily PRN   nitroglycerin 0 4 mg Q5 Min PRN   ondansetron 4 mg Q6H PRN   sodium chloride (PF) 3 mL PRN       Allergies   Allergies   Allergen Reactions    Fish-Derived Products Hives    Rice Bean [Phaseolus] Hives       Advance Directive and Living Will:      Power of :    POLST:        Counseling / Coordination of Care  Total Critical Care time spent 30 minutes excluding procedures, teaching and family updates  Uri Lares PA-C        Portions of the record may have been created with voice recognition software  Occasional wrong word or "sound a like" substitutions may have occurred due to the inherent limitations of voice recognition software    Read the chart carefully and recognize, using context, where substitutions have occurred

## 2019-12-29 VITALS
HEART RATE: 62 BPM | SYSTOLIC BLOOD PRESSURE: 110 MMHG | WEIGHT: 219.8 LBS | OXYGEN SATURATION: 99 % | HEIGHT: 69 IN | DIASTOLIC BLOOD PRESSURE: 64 MMHG | TEMPERATURE: 97.9 F | BODY MASS INDEX: 32.56 KG/M2 | RESPIRATION RATE: 18 BRPM

## 2019-12-29 DIAGNOSIS — I21.3 STEMI (ST ELEVATION MYOCARDIAL INFARCTION) (HCC): ICD-10-CM

## 2019-12-29 DIAGNOSIS — I25.110 CORONARY ARTERY DISEASE INVOLVING NATIVE CORONARY ARTERY OF NATIVE HEART WITH UNSTABLE ANGINA PECTORIS (HCC): ICD-10-CM

## 2019-12-29 PROBLEM — Z95.5 S/P CORONARY ARTERY STENT PLACEMENT: Status: ACTIVE | Noted: 2019-12-29

## 2019-12-29 PROCEDURE — 99238 HOSP IP/OBS DSCHRG MGMT 30/<: CPT | Performed by: INTERNAL MEDICINE

## 2019-12-29 PROCEDURE — NC001 PR NO CHARGE: Performed by: INTERNAL MEDICINE

## 2019-12-29 RX ORDER — CLOPIDOGREL BISULFATE 75 MG/1
75 TABLET ORAL DAILY
Status: DISCONTINUED | OUTPATIENT
Start: 2019-12-30 | End: 2019-12-29 | Stop reason: HOSPADM

## 2019-12-29 RX ORDER — TOPIRAMATE 100 MG/1
100 TABLET, FILM COATED ORAL 2 TIMES DAILY
Qty: 60 TABLET | Refills: 0 | Status: CANCELLED | OUTPATIENT
Start: 2019-12-29

## 2019-12-29 RX ORDER — CLOPIDOGREL BISULFATE 75 MG/1
75 TABLET ORAL DAILY
Qty: 30 TABLET | Refills: 11 | Status: CANCELLED | OUTPATIENT
Start: 2019-12-30

## 2019-12-29 RX ORDER — CLOPIDOGREL BISULFATE 75 MG/1
300 TABLET ORAL ONCE
Status: COMPLETED | OUTPATIENT
Start: 2019-12-29 | End: 2019-12-29

## 2019-12-29 RX ORDER — TOPIRAMATE 100 MG/1
100 TABLET, FILM COATED ORAL 2 TIMES DAILY
Qty: 60 TABLET | Refills: 1
Start: 2019-12-29 | End: 2020-04-02 | Stop reason: SDUPTHER

## 2019-12-29 RX ORDER — METOPROLOL SUCCINATE 25 MG/1
25 TABLET, EXTENDED RELEASE ORAL DAILY
Qty: 30 TABLET | Refills: 2 | Status: SHIPPED | OUTPATIENT
Start: 2019-12-29 | End: 2019-12-29 | Stop reason: SDUPTHER

## 2019-12-29 RX ADMIN — CLOPIDOGREL BISULFATE 300 MG: 75 TABLET ORAL at 08:39

## 2019-12-29 RX ADMIN — ASPIRIN 81 MG 81 MG: 81 TABLET ORAL at 08:39

## 2019-12-29 RX ADMIN — ENOXAPARIN SODIUM 40 MG: 40 INJECTION SUBCUTANEOUS at 08:40

## 2019-12-29 RX ADMIN — RANOLAZINE 500 MG: 500 TABLET, FILM COATED, EXTENDED RELEASE ORAL at 08:39

## 2019-12-29 RX ADMIN — TOPIRAMATE 100 MG: 100 TABLET, FILM COATED ORAL at 08:40

## 2019-12-29 RX ADMIN — METOPROLOL SUCCINATE 25 MG: 25 TABLET, EXTENDED RELEASE ORAL at 08:39

## 2019-12-29 NOTE — NURSING NOTE
AVS reviewed with Xiang Hagan, denies any questions  Medications such as plavix were hit on heavily as well as compliance with meds  Xiang Hagan agreed to be more responsible for his medications   Wheelchair with PCA , Sumanth picked up at ED for transport home

## 2019-12-29 NOTE — PLAN OF CARE
Problem: Prexisting or High Potential for Compromised Skin Integrity  Goal: Skin integrity is maintained or improved  Description  INTERVENTIONS:  - Identify patients at risk for skin breakdown  - Assess and monitor skin integrity  - Assess and monitor nutrition and hydration status  - Monitor labs   - Assess for incontinence   - Turn and reposition patient  - Assist with mobility/ambulation  - Relieve pressure over bony prominences  - Avoid friction and shearing  - Provide appropriate hygiene as needed including keeping skin clean and dry  - Evaluate need for skin moisturizer/barrier cream  - Collaborate with interdisciplinary team   - Patient/family teaching  - Consider wound care consult   Outcome: Progressing     Problem: Potential for Falls  Goal: Patient will remain free of falls  Description  INTERVENTIONS:  - Assess patient frequently for physical needs  -  Identify cognitive and physical deficits and behaviors that affect risk of falls    -  [place] fall precautions as indicated by assessment   - Educate patient/family on patient safety including physical limitations  - Instruct patient to call for assistance with activity based on assessment  - Modify environment to reduce risk of injury  - Consider OT/PT consult to assist with strengthening/mobility  Outcome: Progressing     Problem: SKIN/TISSUE INTEGRITY - ADULT  Goal: Incision(s), wounds(s) or drain site(s) healing without S/S of infection  Description  INTERVENTIONS  - Assess and document risk factors for skin impairment   - Assess and document dressing, incision, wound bed, drain sites and surrounding tissue  - Consider nutrition services referral as needed  - Oral mucous membranes remain intact  - Provide patient/ family education  Outcome: Progressing     Problem: Knowledge Deficit  Goal: Patient/family/caregiver demonstrates understanding of disease process, treatment plan, medications, and discharge instructions  Description  Complete learning assessment and assess knowledge base    Interventions:  - Provide teaching at level of understanding  - Provide teaching via preferred learning methods  Outcome: Progressing

## 2019-12-29 NOTE — DISCHARGE SUMMARY
Discharge Summary    Rachel Guerrero 60 y o  male   MRN: [id number]  Unit/Bed#: [facility] 525-01 Encounter: [id number]    Admission Date: 12/27/2019   Discharge Date: 12/29/2019    Disposition: Home  Primary Care Physician: SANTOS Pennington   OP Cardiologist: CLYDE Oconnor  Interventional Cardiologist: CLYDE Kowalski  Discharge Diagnosis: STEMI involving LAD, CAD  Secondary Diagnoses: VF arrest s/p defibrillation x4 with ROSC; s/p CHARBEL to LAD; bipolar 1 disorder, tobacco abuse, COPD  Condition at Discharge: good  Consultants: Jose Alejandro Ivy PA-C (Critical Care/ICU)    Procedures: Left heart catheterization on 12/27/2019  Vitals:  /64   Pulse 62   Temp 97 9 °F (36 6 °C) (Oral)   Resp 18   Ht 5' 9" (1 753 m)   Wt 99 7 kg (219 lb 12 8 oz)   SpO2 99%   BMI 32 46 kg/m²     Review of Systems   Constitutional: Negative for activity change, chills, diaphoresis, fatigue and fever  HENT: Negative for congestion, nosebleeds, postnasal drip, sneezing and sore throat  Eyes: Negative  Respiratory: Negative for cough, choking and shortness of breath  Cardiovascular: Negative for chest pain, palpitations and leg swelling  Gastrointestinal: Negative for abdominal distention, abdominal pain, diarrhea, nausea and vomiting  Endocrine: Negative  Genitourinary: Negative for decreased urine volume, difficulty urinating, dysuria and hematuria  Musculoskeletal: Negative for back pain, myalgias and neck pain  Skin: Negative  Allergic/Immunologic: Negative  Neurological: Negative for dizziness, tremors, syncope, weakness, light-headedness and headaches  Hematological: Negative  Psychiatric/Behavioral: Negative for agitation and confusion  The patient is not nervous/anxious  14-point ROS completed and negative except as stated in the HPI  Physical Exam   Constitutional: He is oriented to person, place, and time  He appears well-developed and well-nourished     HENT: Head: Normocephalic and atraumatic  Eyes: Pupils are equal, round, and reactive to light  EOM are normal    Neck: Neck supple  JVD present  No tracheal deviation present  Cardiovascular: Normal rate, regular rhythm and intact distal pulses  Murmur heard  Right radial access site bandage intact without signs of active bleeding, ecchymosis, or hematoma  Neurovascularly intact right upper extremity with 5/5 strength   Pulmonary/Chest: Effort normal  No respiratory distress  He has no wheezes  He has rales (very faint at bases)  Abdominal: Soft  Bowel sounds are normal  He exhibits no distension  There is no tenderness  Musculoskeletal: He exhibits no edema or tenderness  Neurological: He is alert and oriented to person, place, and time  Skin: Skin is warm and dry  Psychiatric: His behavior is normal  Cognition and memory are normal    Flat affect     HPI and Hospital Course:   Lawanda Yee is a 22-year-old male with CAD (s/p CHARBEL to proximal LAD and PTCA to D1), history of VF arrest, tobacco abuse, COPD, bipolar 1 disorder, and history of thrombosis of iliac artery who [facility] complaining of waking up with chest pain radiating to his bilateral upper extremities with associated SOB and nausea  EKG in the ED revealed ST segment elevations in V1 and V2, and an MI alert was called  Patient was then admitted to cardiology service and was emergently taken to cath lab  Upon transfer to the cath lab, patient went into ventricular fibrillation and was defibrillated with 200J x4 given recurrent ventricular fibrillation  Patient received less than 30 seconds of chest compressions before ROSC  He also received one 150 mg bolus of IV amiodarone and was started on amiodarone drip  LHC revealed a focal 99% obstruction in the distal aspect of the prior stent in proximal LAD; Xience CHARBEL was placed   Left main was unobstructed; circumflex with luminal irregularities; and RCA with mild luminal irregularities  Upon further questioning, patient did admit to medication noncompliance in regards to his Plavix (therefore this likely does not represent a Plavix failure)  Brilinta was started postprocedure and was priced checked  Co-pay for Brilinta was estimated to be about 30 dollars monthly  Patient stated that he would not be able to afford this, so he was loaded with Plavix on day of discharge instructed to take his Plavix, as well as aspirin, daily  Postprocedure patient was taken to critical care unit for close hemodynamic observation  Due to continued sleepiness, drowsiness, and deviated gaze, patient underwent CT of the head on 12/28 which revealed, "mild microangiopathic changes without evidence of acute intracranial abnormality " On 12/28, patient also reported mild shortness of breath which resolved with 1 time dose of 20 mg IV Lasix  In regards to his medications, Imdur was stopped, metoprolol tartrate was switched to metoprolol succinate, and patient received frequent education on importance of medication noncompliance in regards to his aspirin and Plavix  Patient reported no longer taking Geodon due to feeling lethargic after taking, this medication was discontinued and removed from his medication list  Patient feels that he has been "doing better" off of his psychiatric medications  Patient is recovering homeless person and is hopeful to return to work/no longer be unemployed in the near future  Will provide work note giving lifting restriction post catheterization  Patient will be discharged home on aspirin indefinitely, Plavix for minimum of 1 year, metoprolol succinate, and atorvastatin  Will plan for repeat CBC and BMP in 1 week  Dr Trisha Tomas office in [facility] has been contacted to call patient on Monday to schedule follow-up appointment within the next 5-7 days    Patient is aware that he should contact their office should an appointment to be scheduled by this coming Tuesday  Ambulatory referral to cardiac rehab ordered; patient reports trying cardiac rehab in the past and was unable to afford recurring co-pay  On day of discharge, patient feels well with no current complaints  He denied lightheadedness, dizziness, near-syncope, syncope, diaphoresis, BROWN, shortness of breath, chest pain, chest pressure nausea, vomiting, diarrhea, and constipation  Patient reports that he is eating and sleeping well and eager to go home  Case management to arrange for ride to his home  Discharge Medications:  See after visit summary for reconciled discharge medications provided to patient and family        Pertinent Labs/Diagnostic Testing:  Results from last 7 days   Lab Units 12/28/19  0543 12/27/19  1610 12/27/19  1250   TROPONIN I ng/mL 7 08* 7 97* 5 01*       CBC with Differential:   Results from last 7 days   Lab Units 12/28/19  0543 12/27/19  0912 12/27/19  0659 12/27/19  0658 12/27/19  0657   WBC Thousand/uL 12 65* 12 71*  --   --  11 91*   HEMOGLOBIN g/dL 16 6 15 9  --   --  17 0   I STAT HEMOGLOBIN g/dl  --   --  17 7* 17 3*  --    HEMATOCRIT % 49 7* 47 9  --   --  50 7*   HEMATOCRIT, ISTAT %  --   --  52* 51*  --    MCV fL 99* 100*  --   --  97   PLATELETS Thousands/uL 186 183  --   --  214   MCH pg 33 2 33 2  --   --  32 6   MCHC g/dL 33 4 33 2  --   --  33 5   RDW % 14 6 14 4  --   --  14 4   MPV fL 10 6 10 6  --   --  10 1   NRBC AUTO /100 WBCs  --  0  --   --  0     CMP:   Results from last 7 days   Lab Units 12/28/19  0543 12/27/19  0912 12/27/19  0736 12/27/19  0659 12/27/19  0658 12/27/19  0657   POTASSIUM mmol/L 3 6 4 0 3 5  --   --  3 0*   CHLORIDE mmol/L 110* 108 108  --   --  107   CO2 mmol/L 26 26 25  --   --  25   CO2, I-STAT mmol/L  --   --   --  27 28  --    BUN mg/dL 11 9 10  --   --  11   CREATININE mg/dL 0 90 0 88 0 97  --   --  0 99   GLUCOSE, ISTAT mg/dl  --   --   --  145* 145*  --    CALCIUM mg/dL 8 7 8 2* 8 7  --   --  8 9   AST U/L 57*  -- --   --   --  14   ALT U/L 33  --   --   --   --  22   ALK PHOS U/L 67  --   --   --   --  71   EGFR ml/min/1 73sq m 91 92 83 96  --  81     Lipid Profile:   No results found for: CHOL  Lab Results   Component Value Date    HDL 40 2019    HDL 45 2019    HDL 41 2019     Lab Results   Component Value Date    LDLCALC 80 2019    LDLCALC 97 2019    LDLCALC 90 2019     Lab Results   Component Value Date    TRIG 78 2019    TRIG 81 2019    TRIG 71 2019     Cardiac Testing:  Results for orders placed during the hospital encounter of 19   Echo complete with contrast if indicated    As[facility] [address]  [address]  [phone]    Transthoracic Echocardiogram  2D, M-mode, Doppler, and Color Doppler    Study date:  14-Aug-2019    Patient: Paul Ornelas  MR number: [id number]  Account number: [de-identified]  : 1955  Age: 66 years  Gender: Male  Status: Inpatient  Location: Bedside  Height: 70 in  Weight: 232 5 lb  BP: 121/ 74 mmHg    Indications: Myocardial infarction  Diagnoses: I25 119 - Atherosclerotic heart disease of native coronary artery with unspecified angina pectoris    Sonographer:  Benjie Bella, RDCS  Referring Physician:  Frank Dixon MD  Group:  [facility]  Cardiology Fellow:  Gay Hampton MD  Interpreting Physician:  Carlos Green MD    SUMMARY    LEFT VENTRICLE:  Systolic function was normal  Ejection fraction was estimated to be 55 %  There was possible hypokinesis of the apical wall(s)  AORTIC VALVE:  There was mild regurgitation  HISTORY: PRIOR HISTORY: Heavy smoker, bipolar disorder, schizo-affective psychosis  PROCEDURE: The procedure was performed at the bedside  This was a routine study  The transthoracic approach was used  The study included complete 2D imaging, M-mode, complete spectral Doppler, and color Doppler   The heart rate was 48 bpm,  at the start of the study  Images were obtained from the parasternal, apical, subcostal, and suprasternal notch acoustic windows  Image quality was adequate  LEFT VENTRICLE: Size was normal  Systolic function was normal  Ejection fraction was estimated to be 55 %  There was possible hypokinesis of the apical wall(s)  Wall thickness was normal  DOPPLER: The ratio of early ventricular filling to  atrial contraction velocities was within the normal range  RIGHT VENTRICLE: The size was normal  Systolic function was normal     LEFT ATRIUM: The atrium was mildly dilated  RIGHT ATRIUM: Size was normal     MITRAL VALVE: Valve structure was normal  There was normal leaflet separation  DOPPLER: There was no evidence for stenosis  There was trace regurgitation  AORTIC VALVE: The valve was trileaflet  Leaflets exhibited mildly increased thickness and normal cuspal separation  DOPPLER: There was no evidence for stenosis  There was mild regurgitation  TRICUSPID VALVE: The valve structure was normal  There was normal leaflet separation  DOPPLER: There was no evidence for stenosis  There was trace regurgitation  PULMONIC VALVE: Leaflets exhibited normal thickness, no calcification, and normal cuspal separation  DOPPLER: The transpulmonic velocity was within the normal range  There was no significant regurgitation  PERICARDIUM: There was no pericardial effusion  The pericardium was normal in appearance  AORTA: The root exhibited normal size  SYSTEMIC VEINS: IVC: The inferior vena cava was normal in size      SYSTEM MEASUREMENT TABLES    2D  %FS: 26 16 %  Ao Diam: 3 53 cm  EDV(Teich): 190 01 ml  EF(Teich): 50 41 %  ESV(Teich): 94 22 ml  IVSd: 0 79 cm  LA Area: 21 81 cm2  LA Diam: 4 22 cm  LVEDV MOD A4C: 153 33 ml  LVEF MOD A4C: 51 31 %  LVESV MOD A4C: 74 66 ml  LVIDd: 6 14 cm  LVIDs: 4 54 cm  LVLd A4C: 8 84 cm  LVLs A4C: 7 94 cm  LVPWd: 0 83 cm  RA Area: 17 23 cm2  RVIDd: 3 cm  SV MOD A4C: 78 67 ml  SV(Teich): 95 78 ml    MM  TAPSE: 2 2 cm    PW  E': 0 06 m/s  E/E': 13 36  MV A Moises: 0 68 m/s  MV Dec Trimble: 3 38 m/s2  MV DecT: 245 06 ms  MV E Moises: 0 83 m/s  MV E/A Ratio: 1 21  MV PHT: 71 07 ms  MVA By PHT: 3 1 cm2    [facility]    Prepared and electronically signed by    Misael Trevino MD  Signed 15-Aug-2019 08:55:39       Discharge Instructions/Information to patient and family:   See after visit summary (AVS) for information provided to patient and family  Provisions for Follow-Up Care:  See after visit summary for information related to follow-up care and any pertinent home health orders  Planned Re-admission: No    Discharge Statement   I spent 30 minutes discharging the patient  This time was spent on the day of discharge  I had direct contact with the patient on the day of discharge  Additional documentation is required if more than 30 minutes were spent on discharge       Gm Castillo PA-C

## 2019-12-29 NOTE — DISCHARGE INSTRUCTIONS
Discharge Instructions:   Begin taking aspirin and Plavix every day  Do NOT miss doses of these medications  Only your cardiologist can give approval to stop these medications   Begin taking low-dose atorvastatin tonight  We will check your liver function in 1 week to assess that you're able to tolerated this low dose   No driving for 2 days   No lifting weights over 10 lbs for 5-7 days   Have blood work completed in 1 week  SITE CARE   You may have bruising or small bump   Remove the Band-Aid tomorrow, and then wash and pat dry  Place new bandage over site  Continue to wear Band-Aid for 5 days (no ointments, creams, lotions, or powders on wound)   No soaking of wound for 5-7 days (avoid baths, pools, ponds, lakes, etc )   _______________________________________________________________________________________________________________________________  Coronary Angioplasty   WHAT YOU SHOULD KNOW:   Coronary angioplasty is a procedure that opens arteries in your heart that have a buildup of plaque  Plaque is a mixture of fat and cholesterol  This procedure helps to increase blood flow to your heart  AFTER YOU LEAVE:   Medicines: You will be given any of the following:  · Antiplatelets  prevent blood clots from forming  You will need to take aspirin and another type of platelet medicine  Take this medicine daily as directed  Tell your cardiologist if you miss a dose  · Nitrates , such as nitroglycerin, relax the arteries of your heart so it gets more oxygen  This medicine helps to relieve chest pain  · Cholesterol medicine  helps decrease the amount of cholesterol in your blood  · Blood pressure medicine  lowers your blood pressure  · Take your medicine as directed  Call your healthcare provider if you think your medicine is not helping or if you have side effects  Tell him if you are allergic to any medicine  Keep a list of the medicines, vitamins, and herbs you take   Include the amounts, and when and why you take them  Bring the list or the pill bottles to follow-up visits  Carry your medicine list with you in case of an emergency  Follow up with your cardiologist as directed:  Write down your questions so you remember to ask them during your visits  Activity:   · Keep your leg straight as much as possible  Try not to bend at the site of the incision for 24 to 48 hours  · You may feel like resting more after your procedure  Slowly start to do more each day  Rest when you feel it is needed  · Ask when you can return to your daily activities  Wound care:  Carefully wash the wound with soap and water  Dry the area and put on new, clean bandages as directed  Change your bandages when they get wet or dirty  Do not smoke: If you smoke, it is never too late to quit  Smoking increases your risk for heart disease and stroke  Ask your cardiologist if you need help quitting  Cardiac rehab:  Your cardiologist may recommend that you attend cardiac rehabilitation (rehab)  This is a program run by specialists who will help you safely strengthen your heart and reduce the risk of more heart disease  The plan includes exercise, relaxation, stress management, and heart-healthy nutrition  Caregivers will also check to make sure any medicines you are taking are working  Contact your cardiologist if:   · You have a fever or chills  · You have questions or concerns about your condition or care  Seek care immediately or call 911 if:   · Your incision is swollen, red, or has pus or foul-smelling fluid coming from it  · You start to bleed from your catheter site again      · You have any of the following signs of a heart attack:     ¨ Squeezing, pressure, fullness, or pain in your chest that lasts longer than a few minutes or returns     ¨ Discomfort or pain in your back, neck, jaw, stomach, or arm    ¨ Shortness of breath or breathing problems    ¨ A sudden cold sweat, lightheadedness, dizziness, or nausea, especially with chest pain or trouble breathing    · You have any of the following signs of a stroke:     ¨ Part of your face droops or is numb    ¨ Weakness in an arm or leg    ¨ Confusion or difficulty speaking    ¨ Dizziness, a severe headache, or vision loss    · Your arm or leg feels warm, tender, and painful  It may look swollen and red  © 2014 [address] is for End User's use only and may not be sold, redistributed or otherwise used for commercial purposes  All illustrations and images included in CareNotes® are the copyrighted property of [employer]  or Benny Olivera  The above information is an  only  It is not intended as medical advice for individual conditions or treatments  Talk to your doctor, nurse or pharmacist before following any medical regimen to see if it is safe and effective for you

## 2019-12-29 NOTE — PROGRESS NOTES
Progress Note - Cardiology   Anna Guerrero 60 y o  male MRN: [id number]  Unit/Bed#: [facility] 525-01 Encounter: [id number]    Assessment:  Principal Problem:    Acute ST elevation myocardial infarction (STEMI) involving left anterior descending (LAD) coronary artery ([facility])  Active Problems:    Coronary artery disease involving native coronary artery of native heart with unstable angina pectoris ([facility])    Bipolar 1 disorder ([facility])    Tobacco user    COPD (chronic obstructive pulmonary disease) ([facility])    S/P coronary artery stent placement    In stent thrombosis in the setting of not being compliant with his plavix  Not truly failure of Plavix, but now two layers of stent in LAD  Overall preserved LV function, RWMA noted in LAD territory  Mild pulmonary hypertension  Smoker    Plan:  Continue dual antiplatelet therapy  Brilinta was too expensive  Reload with Plavix and continue 75 mg daily  Continue 25 mg of Toprol-XL daily  High-intensity atorvastatin  Not to interrupt dual antiplatelet therapy  Smoking cessation  Outpatient cardiac rehab  Okay for discharge from cardiac standpoint, follow up with regular cardiologist in [address] [place] [address]  As he was not taking his GEodon prior to admission, and had lethargy with this, will not reinitiate at this time  Subjective/Objective     Subjective: Once again was lethargic yesterday  Clarify that he was not taking his Geodon at home  Denies any chest pain  Shortness of breath is improved today compared to yesterday after the IV Lasix        Objective:  Vitals: /66 (BP Location: Left arm)   Pulse 57   Temp 97 7 °F (36 5 °C) (Oral)   Resp 18   Ht 5' 9" (1 753 m)   Wt 99 7 kg (219 lb 12 8 oz)   SpO2 98%   BMI 32 46 kg/m²   Vitals:    12/28/19 0600 12/29/19 0456   Weight: 101 kg (222 lb 9 6 oz) 99 7 kg (219 lb 12 8 oz)     Orthostatic Blood Pressures      Most Recent Value   Blood Pressure  109/66 filed at 12/29/2019 0976   Patient Position - Orthostatic VS Lying filed at 12/29/2019 0149          Intake/Output Summary (Last 24 hours) at 12/29/2019 0948  Last data filed at 12/29/2019 0825  Gross per 24 hour   Intake 852 ml   Output 2350 ml   Net -1498 ml     Physical Exam:  GEN: Georgina Guerrero appears well, alert and oriented x 3, pleasant and cooperative   HEENT: pupils equal, round, and reactive to light; extraocular muscles intact  NECK: supple, no carotid bruits   HEART: regular rhythm, normal S1 and S2, no murmurs, clicks, gallops or rubs   LUNGS: clear to auscultation bilaterally; no wheezes, rales, or rhonchi   ABDOMEN: normal bowel sounds, soft, no tenderness, no distention  EXTREMITIES: peripheral pulses normal; no clubbing, cyanosis, or edema  NEURO: no focal findings   SKIN: normal without suspicious lesions on exposed skin    Medications:    Current Facility-Administered Medications:     acetaminophen (TYLENOL) tablet 650 mg, 650 mg, Oral, Q6H PRN, Preston Humphries MD    aspirin chewable tablet 81 mg, 81 mg, Oral, Daily, Preston Humphries MD, 81 mg at 12/29/19 0839    atorvastatin (LIPITOR) tablet 80 mg, 80 mg, Oral, QPM, Preston Humphries MD, 80 mg at 12/28/19 1712    bisacodyl (DULCOLAX) rectal suppository 10 mg, 10 mg, Rectal, Daily PRN, Preston Humphries MD  [facility]  [START ON 12/30/2019] clopidogrel (PLAVIX) tablet 75 mg, 75 mg, Oral, Daily, Preston Humphries MD    enoxaparin (LOVENOX) subcutaneous injection 40 mg, 40 mg, Subcutaneous, Daily, Preston Humphries MD, 40 mg at 12/29/19 0840    metoprolol succinate (TOPROL-XL) 24 hr tablet 25 mg, 25 mg, Oral, Daily, Preston Humphries MD, 25 mg at 12/29/19 0839    nicotine (NICODERM CQ) 21 mg/24 hr TD 24 hr patch 1 patch, 1 patch, Transdermal, Daily, Preston Humphries MD    nitroglycerin (NITROSTAT) SL tablet 0 4 mg, 0 4 mg, Sublingual, Q5 Min PRN, Preston Humphries MD    ondansetron [facility]) injection 4 mg, 4 mg, Intravenous, Q6H PRN, Preston Humphries MD    ranolazine (RANEXA) 12 hr tablet 500 mg, 500 mg, Oral, BID, Preston Humphries MD, 500 mg at 12/29/19 0839    senna-docusate sodium (SENOKOT S) 8 6-50 mg per tablet 1 tablet, 1 tablet, Oral, HS, Isauro Whaley MD, 1 tablet at 12/27/19 2245    Insert peripheral IV, , , Once **AND** sodium chloride (PF) 0 9 % injection 3 mL, 3 mL, Intravenous, PRN, Isauro Whaley MD    topiramate (TOPAMAX) tablet 100 mg, 100 mg, Oral, BID, Isuaro Whaley MD, 100 mg at 12/29/19 0840    Lab Results:  Results from last 7 days   Lab Units 12/28/19  0543 12/27/19  1610 12/27/19  1250   TROPONIN I ng/mL 7 08* 7 97* 5 01*     Results from last 7 days   Lab Units 12/28/19  0543 12/27/19  0912 12/27/19  0659  12/27/19  0657   WBC Thousand/uL 12 65* 12 71*  --   --  11 91*   HEMOGLOBIN g/dL 16 6 15 9  --   --  17 0   I STAT HEMOGLOBIN g/dl  --   --  17 7*   < >  --    HEMATOCRIT % 49 7* 47 9  --   --  50 7*   HEMATOCRIT, ISTAT %  --   --  52*   < >  --    PLATELETS Thousands/uL 186 183  --   --  214    < > = values in this interval not displayed  Results from last 7 days   Lab Units 12/28/19  0543 12/27/19  0657   TRIGLYCERIDES mg/dL 78 81   HDL mg/dL 40 45     Results from last 7 days   Lab Units 12/28/19  0543 12/27/19  0912 12/27/19  0736 12/27/19  0659 12/27/19  0658  12/27/19  0657   POTASSIUM mmol/L 3 6 4 0 3 5  --   --   --  3 0*   CHLORIDE mmol/L 110* 108 108  --   --   --  107   CO2 mmol/L 26 26 25  --   --   --  25   CO2, I-STAT mmol/L  --   --   --  27 28   < >  --    BUN mg/dL 11 9 10  --   --   --  11   CREATININE mg/dL 0 90 0 88 0 97  --   --   --  0 99   CALCIUM mg/dL 8 7 8 2* 8 7  --   --   --  8 9   ALK PHOS U/L 67  --   --   --   --   --  71   ALT U/L 33  --   --   --   --   --  22   AST U/L 57*  --   --   --   --   --  14   GLUCOSE, ISTAT mg/dl  --   --   --  145* 145*  --   --     < > = values in this interval not displayed       Results from last 7 days   Lab Units 12/27/19  0657   INR  1 11   PTT seconds 26     Results from last 7 days   Lab Units 12/28/19  0543 12/27/19  0912 12/27/19  0736   MAGNESIUM mg/dL 2 3 2 0 2 1       Telemetry: Personally reviewed  Sinus rhythm/bradycardia    Echo:  Systolic function was normal  Ejection fraction was estimated to be 55 %  There was hypokinesis of the apical anterior, apical inferior, and apical septal wall(s)  There was mild concentric hypertrophy      MITRAL VALVE:  There was trace to mild regurgitation      AORTIC VALVE:  There was mild regurgitation      TRICUSPID VALVE:  There was mild regurgitation  Estimated peak PA pressure was 41 mmHg  The findings suggest mild pulmonary hypertension      Cardiac Cath:  CORONARY CIRCULATION:  Left main: Normal   Mid LAD: There was a 90 % stenosis at the site of a prior stent  Circumflex: Angiography showed minor luminal irregularities  RCA: Angiography showed minor luminal irregularities      1ST LESION INTERVENTIONS:  A drug-eluting stent procedure was performed on the 99 % lesion in the mid LAD  Following intervention there was a 0 % residual stenosis    A Xience Felipa Rx 3 5 x 18mm drug-eluting stent was placed across the lesion and deployed at a maximum inflation pressure of 16 juliann

## 2019-12-30 RX ORDER — METOPROLOL SUCCINATE 25 MG/1
25 TABLET, EXTENDED RELEASE ORAL DAILY
Qty: 90 TABLET | Refills: 1 | Status: SHIPPED | OUTPATIENT
Start: 2019-12-30 | End: 2020-01-09 | Stop reason: SDUPTHER

## 2019-12-31 ENCOUNTER — TRANSITIONAL CARE MANAGEMENT (OUTPATIENT)
Dept: FAMILY MEDICINE CLINIC | Facility: CLINIC | Age: 64
End: 2019-12-31

## 2020-01-02 ENCOUNTER — PATIENT OUTREACH (OUTPATIENT)
Dept: CASE MANAGEMENT | Facility: OTHER | Age: 65
End: 2020-01-02

## 2020-01-02 NOTE — PROGRESS NOTES
Patient returned my call for outreach, he was meeting with the CHW earlier  Patient states he picked up refills of all of his meds & paid a total of $19  He states he is taking Plavix & aspirin as ordered  He will [facility] up at [facility]  He requested assistance in rescheduling his ortho appointment - 1/10/20 @ 1PM with Dr Ezequiel Scruggs in [facility] - he is aware & will be there  Patient states he cut back to 12-14 cigarettes/day, but is still not interested in quitting  Discussed the money he would save, but he states he found them for $1/pack in [place]  Patient would like to find a job  He approached a few [employer] as he has experience & a degree in it, as well as [employer]  He states some of the places told him they were looking 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  He has been in touch with [employer] for vocational training & employment assistance  He states he started working with them before, but they were deducting the cost of his employment assistance from the money set aside for training  They are sending him information so he can resume services with them  Briefly discussed him joining households with his girlfriend to reduce financial stress, but she is not in a position to do so at this time  He denies SOB, lightheadedness, dizziness, chest pain (has not had to use nitro), or swelling  He states his insertion site is healed  Will continue to follow

## 2020-01-03 ENCOUNTER — OFFICE VISIT (OUTPATIENT)
Dept: FAMILY MEDICINE CLINIC | Facility: CLINIC | Age: 65
End: 2020-01-03
Payer: COMMERCIAL

## 2020-01-03 VITALS
HEART RATE: 76 BPM | OXYGEN SATURATION: 96 % | DIASTOLIC BLOOD PRESSURE: 78 MMHG | WEIGHT: 212.5 LBS | SYSTOLIC BLOOD PRESSURE: 148 MMHG | TEMPERATURE: 97.3 F | BODY MASS INDEX: 31.38 KG/M2

## 2020-01-03 DIAGNOSIS — Z95.5 S/P CORONARY ARTERY STENT PLACEMENT: Primary | ICD-10-CM

## 2020-01-03 DIAGNOSIS — Z72.0 TOBACCO USER: ICD-10-CM

## 2020-01-03 PROCEDURE — 99213 OFFICE O/P EST LOW 20 MIN: CPT | Performed by: FAMILY MEDICINE

## 2020-01-03 NOTE — PROGRESS NOTES
Subjective:       Patient is a 60 y o  male w/ PMH CAD s/p left heart catheterization on 12/27/2019, bipolar 1 disorder, tobacco abuse, COPD who presents for follow-up of atherosclerotic coronary artery disease  Recent history: taking medications as instructed (except for Metoprolol Tartrate 25mg qd and Ranolaxine 500mg q12h), no medication side effects noted, no TIA's, no chest pain on exertion, no dyspnea on exertion, no swelling of ankles and no palpitations  Patient's symptoms have been completely resolved  Medication side effects include: none  Endorses feeling stressed regarding financial situation, recently have an apt, still unemployed  Plan to discuss situation w/   Patient Admitted at [facility] from December 27 to December 29  During hospitalization VF arrest s/p defibrillation x4 with ROSC  Review of Systems  Pertinent items are noted in HPI  Objective:      Physical Exam  Vitals:    01/03/20 1012   BP: 148/78   Pulse: 76   Temp: (!) 97 3 °F (36 3 °C)   SpO2: 96%     Physical Exam   Constitutional: He appears well-developed  No distress  HENT:   Head: Normocephalic and atraumatic  Eyes: Conjunctivae are normal    Neck: No tracheal deviation present  Cardiovascular: Normal rate, regular rhythm and normal heart sounds  No murmur heard  Pulmonary/Chest: Effort normal and breath sounds normal  No respiratory distress  He has no wheezes  He has no rales  Abdominal: Soft  He exhibits no distension  There is no tenderness  There is no guarding  Musculoskeletal: Normal range of motion  Neurological: No cranial nerve deficit  Skin: Skin is warm and dry  Assessment:       Atherosclerotic coronary artery disease s/p left heart catheterization         Plan:     Problem List Items Addressed This Visit        Cardiovascular and Mediastinum    Coronary artery disease involving native coronary artery of native heart with unstable angina pectoris ([facility] [address] ) - Primary    Acute ST elevation myocardial infarction (STEMI) involving left anterior descending (LAD) coronary artery ([facility])     S/p stent of LAD 12/27/19  Continue with Plavix 75 mg qd, Lipitor 80mg & aspirin 81mg  Patient reminded to take Ranexa 500 mg BID per AVS   Elevated BP, patient instructed to take Metoprolol tartrate 25 mg daily (received on recent hospitalization)  F/u with Dr Reji Ellis            Other    Tobacco user     · Smoking cessation encouraged  · Patient deferred medical management with nicotine patch  · CT chest to r/o lung CA discussed with patient given extensive smoking hx, deferred by patient  · Also defered influenza and PCV-13 vaccine   TCM Call (since 12/3/2019)     Date and time call was made  12/31/2019  8:48 AM    Hospital care reviewed  Records reviewed    Patient was hospitialized at  [address] [place] [address]    Date of Admission  12/27/19    Date of discharge  12/29/19    Diagnosis  S/P MI    Disposition  Home    Were the patients medications reviewed and updated  Yes    Current Symptoms  None      TCM Call (since 12/3/2019)     Post hospital issues  None    Should patient be enrolled in anticoag monitoring? No    Scheduled for follow up? Yes    Patients specialists  Cardiologist    Did you obtain your prescribed medications  Yes    Do you need help managing your prescriptions or medications  No    Is transportation to your appointment needed  No    I have advised the patient to call PCP with any new or worsening symptoms  L  [facility] LPN

## 2020-01-03 NOTE — ASSESSMENT & PLAN NOTE
· Smoking cessation encouraged  · Patient deferred medical management with nicotine patch  · CT chest to r/o lung CA discussed with patient given extensive smoking hx, deferred by patient  · Also defered influenza and PCV-13 vaccine

## 2020-01-03 NOTE — ASSESSMENT & PLAN NOTE
S/p stent of LAD 12/27/19  Continue with Plavix 75 mg qd, Lipitor 80mg & aspirin 81mg    Patient reminded to take Ranexa 500 mg BID per AVS   Elevated BP, patient instructed to take Metoprolol tartrate 25 mg daily (received on recent hospitalization)  F/u with Dr Eduardo Covert

## 2020-01-03 NOTE — PROGRESS NOTES
Went for scheduled appointment  Patient feeling better after recent discharge  Patient has all medications and is taking properly  Patient is driving and can shop and cook for himself  Patient stated he needs to apply for jobs for extra money  CHW assisted in applying for a few open positions locally that patient was interested in  CHW  advised patient she has a friend with furniture for him and it will be delivered by 1/11  Patient very thankful  CHW and patient will f/u with [facility] to see when  will come for visit to start program  Patient also advised Sherlyn from [employer] called and will be setting up volunteers to help him in his home   No other needs at this time, CHW will continue to assist

## 2020-01-03 NOTE — ASSESSMENT & PLAN NOTE
· S/p stent of LAD 12/27/19  · Continue with Plavix 75 mg qd, Lipitor 80mg & aspirin 81mg    · Patient reminded to take Ranexa 500 mg BID per AVS   · Elevated BP, patient instructed to take Metoprolol tartrate 25 mg daily (received on recent hospitalization)  · F/u with Dr Hero Zhang

## 2020-01-09 ENCOUNTER — PATIENT OUTREACH (OUTPATIENT)
Dept: CASE MANAGEMENT | Facility: OTHER | Age: 65
End: 2020-01-09

## 2020-01-09 ENCOUNTER — OFFICE VISIT (OUTPATIENT)
Dept: CARDIOLOGY CLINIC | Facility: CLINIC | Age: 65
End: 2020-01-09
Payer: COMMERCIAL

## 2020-01-09 VITALS
HEART RATE: 82 BPM | OXYGEN SATURATION: 97 % | WEIGHT: 209 LBS | DIASTOLIC BLOOD PRESSURE: 82 MMHG | HEIGHT: 69 IN | BODY MASS INDEX: 30.96 KG/M2 | SYSTOLIC BLOOD PRESSURE: 130 MMHG

## 2020-01-09 DIAGNOSIS — I21.A1 TYPE 2 MYOCARDIAL INFARCTION WITHOUT ST ELEVATION (HCC): ICD-10-CM

## 2020-01-09 DIAGNOSIS — I21.4 MI, ACUTE, NON ST SEGMENT ELEVATION (HCC): ICD-10-CM

## 2020-01-09 DIAGNOSIS — I20.1 CORONARY ARTERY SPASM (HCC): ICD-10-CM

## 2020-01-09 DIAGNOSIS — I25.110 CORONARY ARTERY DISEASE INVOLVING NATIVE CORONARY ARTERY OF NATIVE HEART WITH UNSTABLE ANGINA PECTORIS (HCC): Primary | ICD-10-CM

## 2020-01-09 DIAGNOSIS — I21.3 STEMI (ST ELEVATION MYOCARDIAL INFARCTION) (HCC): ICD-10-CM

## 2020-01-09 DIAGNOSIS — I20.8 CHRONIC STABLE ANGINA (HCC): ICD-10-CM

## 2020-01-09 PROCEDURE — 99214 OFFICE O/P EST MOD 30 MIN: CPT | Performed by: INTERNAL MEDICINE

## 2020-01-09 PROCEDURE — 93000 ELECTROCARDIOGRAM COMPLETE: CPT | Performed by: INTERNAL MEDICINE

## 2020-01-09 RX ORDER — ATORVASTATIN CALCIUM 80 MG/1
80 TABLET, FILM COATED ORAL EVERY EVENING
Qty: 90 TABLET | Refills: 3 | Status: SHIPPED | OUTPATIENT
Start: 2020-01-09 | End: 2020-04-02 | Stop reason: SDUPTHER

## 2020-01-09 RX ORDER — CLOPIDOGREL BISULFATE 75 MG/1
75 TABLET ORAL DAILY
Qty: 90 TABLET | Refills: 3 | Status: SHIPPED | OUTPATIENT
Start: 2020-01-09 | End: 2020-04-02 | Stop reason: SDUPTHER

## 2020-01-09 RX ORDER — NITROGLYCERIN 0.4 MG/1
0.4 TABLET SUBLINGUAL
Qty: 30 TABLET | Refills: 3 | Status: SHIPPED | OUTPATIENT
Start: 2020-01-09 | End: 2020-04-02 | Stop reason: SDUPTHER

## 2020-01-09 RX ORDER — ASPIRIN 81 MG/1
81 TABLET, CHEWABLE ORAL DAILY
Qty: 90 TABLET | Refills: 3 | Status: SHIPPED | OUTPATIENT
Start: 2020-01-09 | End: 2020-04-02 | Stop reason: SDUPTHER

## 2020-01-09 RX ORDER — RANOLAZINE 500 MG/1
500 TABLET, EXTENDED RELEASE ORAL 2 TIMES DAILY
Qty: 180 TABLET | Refills: 3 | Status: SHIPPED | OUTPATIENT
Start: 2020-01-09 | End: 2020-04-02 | Stop reason: SDUPTHER

## 2020-01-09 RX ORDER — METOPROLOL SUCCINATE 25 MG/1
25 TABLET, EXTENDED RELEASE ORAL DAILY
Qty: 90 TABLET | Refills: 1 | Status: SHIPPED | OUTPATIENT
Start: 2020-01-09 | End: 2020-04-02 | Stop reason: SDUPTHER
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
## 2020-01-09 NOTE — LETTER
January 9, 2020     Patient: Georgina Guerrero   YOB: 1955   Date of Visit: 1/9/2020       To Whom it May Concern:    Lacy Galvan is under my professional care  He was seen in my office on 1/9/2020  He may return to work on 1/9/20 without limitatiohs  If you have any questions or concerns, please don't hesitate to call  Sincerely,          Kateri Skiff, MD        CC: Georgina Hawley   Yolanda

## 2020-01-14 ENCOUNTER — TELEPHONE (OUTPATIENT)
Dept: FAMILY MEDICINE CLINIC | Facility: CLINIC | Age: 65
End: 2020-01-14

## 2020-01-14 NOTE — TELEPHONE ENCOUNTER
Pt called for refill of Viagra 100mg prn #20  He said that he has been on this med but I dont see it on the list

## 2020-01-15 ENCOUNTER — PATIENT OUTREACH (OUTPATIENT)
Dept: CASE MANAGEMENT | Facility: OTHER | Age: 65
End: 2020-01-15

## 2020-01-16 ENCOUNTER — TELEPHONE (OUTPATIENT)
Dept: FAMILY MEDICINE CLINIC | Facility: CLINIC | Age: 65
End: 2020-01-16

## 2020-01-16 NOTE — TELEPHONE ENCOUNTER
Patient is a nitroglycerin which is contraindicated with Viagra  Also I agree with Dr Rebecca Pandey on discontinuing his Viagra  Will not be refilling  Please advise the patient thank you!

## 2020-01-17 ENCOUNTER — PATIENT OUTREACH (OUTPATIENT)
Dept: CASE MANAGEMENT | Facility: OTHER | Age: 65
End: 2020-01-17

## 2020-01-17 NOTE — PROGRESS NOTES
Attempted outreach to check on med compliance & missed ortho appointment  Left a voicemail requesting a call back

## 2020-01-17 NOTE — PROGRESS NOTES
Patient returned call immediately  He denies chest pain, lightheadedness, dizziness, SOB  Reviewed his med list, he is taking all meds as ordered, but has not needed the nitro  He states he called ortho to cancel the appointment  Advised patient this was the second appointment we scheduled per his request & he will need to call back to reschedule  He states he is unsure what he would like to do at this time, but he will reschedule if & when he makes a decision  Reminded patient of his PCP appointment on 1/27/20  He states he is aware & confirms he has reliable transportation & will be there  He states no needs or questions at this time

## 2020-01-22 ENCOUNTER — PATIENT OUTREACH (OUTPATIENT)
Dept: CASE MANAGEMENT | Facility: OTHER | Age: 65
End: 2020-01-22

## 2020-01-23 ENCOUNTER — PATIENT OUTREACH (OUTPATIENT)
Dept: CASE MANAGEMENT | Facility: OTHER | Age: 65
End: 2020-01-23

## 2020-01-24 ENCOUNTER — PATIENT OUTREACH (OUTPATIENT)
Dept: CASE MANAGEMENT | Facility: OTHER | Age: 65
End: 2020-01-24

## 2020-01-24 NOTE — PROGRESS NOTES
Went for home visit, patient doing well has had volunteers come to assist with getting apartment together  Patient has all medication and takes properly  Has food and cooks for himself  Patient drives but has no gas, got a job to help supplement income and pay bills  Starts Sunday at job  Reached out to [facility]  And she is able to meet me Friday to get gas cards for patient to have enough fuel to get to his job

## 2020-01-24 NOTE — PROGRESS NOTES
Met with patient at home after meeting with Preston Cruz  for gas cards  Patient very grateful for gas cards, he can now have enough gas to get him to job on Sunday to help with additional income   No other needs at this time

## 2020-01-30 ENCOUNTER — HOSPITAL ENCOUNTER (OUTPATIENT)
Dept: RADIOLOGY | Facility: HOSPITAL | Age: 65
Discharge: HOME | End: 2020-01-30
Payer: COMMERCIAL

## 2020-01-30 ENCOUNTER — TRANSCRIBE ORDERS (OUTPATIENT)
Dept: ADMINISTRATIVE | Facility: HOSPITAL | Age: 65
End: 2020-01-30

## 2020-01-30 ENCOUNTER — OFFICE VISIT (OUTPATIENT)
Dept: FAMILY MEDICINE CLINIC | Facility: CLINIC | Age: 65
End: 2020-01-30
Payer: COMMERCIAL

## 2020-01-30 ENCOUNTER — PATIENT OUTREACH (OUTPATIENT)
Dept: CASE MANAGEMENT | Facility: OTHER | Age: 65
End: 2020-01-30

## 2020-01-30 VITALS
HEIGHT: 70 IN | BODY MASS INDEX: 31.07 KG/M2 | SYSTOLIC BLOOD PRESSURE: 116 MMHG | HEART RATE: 74 BPM | RESPIRATION RATE: 20 BRPM | OXYGEN SATURATION: 98 % | DIASTOLIC BLOOD PRESSURE: 70 MMHG | WEIGHT: 217 LBS | TEMPERATURE: 97.2 F

## 2020-01-30 DIAGNOSIS — L03.116 CELLULITIS OF LEFT FOOT: ICD-10-CM

## 2020-01-30 DIAGNOSIS — L03.116 CELLULITIS OF LEFT FOOT: Primary | ICD-10-CM

## 2020-01-30 PROCEDURE — 73610 X-RAY EXAM OF ANKLE: CPT

## 2020-01-30 PROCEDURE — 73630 X-RAY EXAM OF FOOT: CPT

## 2020-01-30 PROCEDURE — 99213 OFFICE O/P EST LOW 20 MIN: CPT | Performed by: FAMILY MEDICINE

## 2020-01-30 PROCEDURE — 1111F DSCHRG MED/CURRENT MED MERGE: CPT | Performed by: FAMILY MEDICINE

## 2020-01-30 RX ORDER — CEPHALEXIN 500 MG/1
1000 CAPSULE ORAL EVERY 6 HOURS SCHEDULED
Qty: 80 CAPSULE | Refills: 0 | Status: SHIPPED | OUTPATIENT
Start: 2020-01-30 | End: 2020-01-31 | Stop reason: SDUPTHER

## 2020-01-31 ENCOUNTER — TELEPHONE (OUTPATIENT)
Dept: FAMILY MEDICINE CLINIC | Facility: CLINIC | Age: 65
End: 2020-01-31

## 2020-01-31 DIAGNOSIS — L03.116 CELLULITIS OF LEFT FOOT: ICD-10-CM

## 2020-01-31 RX ORDER — CEPHALEXIN 500 MG/1
1000 CAPSULE ORAL EVERY 12 HOURS SCHEDULED
Qty: 40 CAPSULE | Refills: 0 | Status: SHIPPED | OUTPATIENT
Start: 2020-01-31 | End: 2020-02-10
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
## 2020-01-31 NOTE — TELEPHONE ENCOUNTER
Patient called regarding Rx for (Kelflex 500mg) that was prescribed during 1/30/2020 office visit  States that he no longer uses [facility] and would like this sent to [facility] in [place]  I did update his chart removing [facility] pharmacy

## 2020-02-02 NOTE — PROGRESS NOTES
Assessment/Plan:     Diagnoses and all orders for this visit:    Cellulitis of left foot  -     XR foot 3+ vw left; Future  -     XR ankle 3+ vw left; Future  -     Discontinue: cephalexin (KEFLEX) 500 mg capsule; Take 2 capsules (1,000 mg total) by mouth every 6 (six) hours for 10 days    Return to office in 1 week for re-evaluation         Subjective:      Patient ID: Faye Johnston is a 60 y o  male  60year old male presents complaining of pain in his left foot  Patient reports that for the past week has been experiencing worsening of his left foot pain  He currently lives outside and has very limited access to shelter  Patient reports taking a significant amount of Aspirins for the pain, but that has not provided much relief  He reports the pain as sharp, 9/10 in severity and radiating down to the toes  He reports a numbness of his toes  Pain is worse when he steps on the foot, and improves with rest  Denies fever, chills or shortness of breath  The following portions of the patient's history were reviewed and updated as appropriate: allergies, current medications, past family history, past medical history, past social history, past surgical history and problem list     Review of Systems   Constitutional: Negative for chills, fatigue and fever  HENT: Negative  Respiratory: Negative for cough, shortness of breath and wheezing  Cardiovascular: Negative for chest pain, palpitations and leg swelling  Gastrointestinal: Negative for abdominal pain, constipation, diarrhea, nausea and vomiting  Musculoskeletal:        Left foot pain    Skin: Positive for color change  Neurological: Negative  Psychiatric/Behavioral: The patient is nervous/anxious            Objective:      /70 (BP Location: Left arm, Patient Position: Sitting, Cuff Size: Standard)   Pulse 74   Temp (!) 97 2 °F (36 2 °C) (Tympanic)   Resp 20   Ht 5' 10" (1 778 m)   Wt 98 4 kg (217 lb)   SpO2 98%   BMI 31 14 kg/m² Physical Exam   Constitutional: He is oriented to person, place, and time  He appears well-developed  No distress  Cardiovascular: Normal rate, regular rhythm and normal heart sounds  No murmur heard  Pulmonary/Chest: Effort normal and breath sounds normal  No respiratory distress  He has no wheezes  Abdominal: Soft  Bowel sounds are normal  He exhibits no distension  There is no guarding  Neurological: He is alert and oriented to person, place, and time  Skin: Capillary refill takes less than 2 seconds  He is not diaphoretic  Erythema, warmth and tenderness of dorsal aspect of left foot  Break in the skin around the left medial ankle appears to be the site of most tenderness  Streak of erythema present descending toward the toes  Psychiatric: His mood appears anxious  He is agitated  Vitals reviewed

## 2020-02-03 ENCOUNTER — PATIENT OUTREACH (OUTPATIENT)
Dept: CASE MANAGEMENT | Facility: OTHER | Age: 65
End: 2020-02-03

## 2020-02-03 ENCOUNTER — TELEPHONE (OUTPATIENT)
Dept: FAMILY MEDICINE CLINIC | Facility: CLINIC | Age: 65
End: 2020-02-03

## 2020-02-03 NOTE — TELEPHONE ENCOUNTER
Patient is in severe pain and said he can hardly get around    Would like a call back regardless of whether results are back yet or not,

## 2020-02-03 NOTE — TELEPHONE ENCOUNTER
Patient called in looking for Xray results that were done on 1/30/2020  He states he is in a lot of pain and is having a difficult time getting around

## 2020-02-04 ENCOUNTER — PATIENT OUTREACH (OUTPATIENT)
Dept: CASE MANAGEMENT | Facility: OTHER | Age: 65
End: 2020-02-04

## 2020-02-04 NOTE — PROGRESS NOTES
Patient called requesting his x-ray results  He does not use My Chart  Advised he will need to contact his PCP again  Patient states understanding

## 2020-02-05 ENCOUNTER — EPISODE CHANGES (OUTPATIENT)
Dept: CASE MANAGEMENT | Facility: OTHER | Age: 65
End: 2020-02-05

## 2020-02-06 ENCOUNTER — PATIENT OUTREACH (OUTPATIENT)
Dept: CASE MANAGEMENT | Facility: OTHER | Age: 65
End: 2020-02-06

## 2020-02-06 NOTE — PROGRESS NOTES
Went for home visit, patient complaining of foot pain but has spoke with PCP and needs to take prescribed medication before scheduling a follow up  Patient has all medications and is taking  Patient asked if CHW could pick his clothes at  and she agreed  Patient has food and will walk to food bank if needed  CHW assisting with getting cash to help compensate for bills  Patient spoke with [facility] and will be getting a case manger to assist with further needs  CHW will continue to assist as needed

## 2020-02-13 ENCOUNTER — PATIENT OUTREACH (OUTPATIENT)
Dept: CASE MANAGEMENT | Facility: OTHER | Age: 65
End: 2020-02-13

## 2020-02-13 NOTE — PROGRESS NOTES
Went for last home visit  Patient is well has food and medication  Patient has spoke with Enid Amin at [facility] is waiting on nurse from state [facility] program to set up visit and assist patient with further needs  CHW wrapped up case and patient agreeable   aware of closure

## 2020-02-17 ENCOUNTER — HOSPITAL ENCOUNTER (INPATIENT)
Facility: HOSPITAL | Age: 65
LOS: 1 days | DRG: 594 | End: 2020-02-17
Attending: EMERGENCY MEDICINE | Admitting: FAMILY MEDICINE
Payer: COMMERCIAL

## 2020-02-17 ENCOUNTER — OFFICE VISIT (OUTPATIENT)
Dept: FAMILY MEDICINE CLINIC | Facility: CLINIC | Age: 65
End: 2020-02-17
Payer: COMMERCIAL

## 2020-02-17 ENCOUNTER — APPOINTMENT (EMERGENCY)
Dept: RADIOLOGY | Facility: HOSPITAL | Age: 65
DRG: 594 | End: 2020-02-17
Payer: COMMERCIAL

## 2020-02-17 ENCOUNTER — HOSPITAL ENCOUNTER (INPATIENT)
Facility: HOSPITAL | Age: 65
LOS: 4 days | Discharge: HOME HEALTH | DRG: 271 | End: 2020-02-21
Attending: FAMILY MEDICINE | Admitting: FAMILY MEDICINE
Payer: COMMERCIAL

## 2020-02-17 VITALS
HEART RATE: 60 BPM | TEMPERATURE: 98.3 F | SYSTOLIC BLOOD PRESSURE: 113 MMHG | RESPIRATION RATE: 20 BRPM | OXYGEN SATURATION: 98 % | DIASTOLIC BLOOD PRESSURE: 55 MMHG

## 2020-02-17 VITALS
HEIGHT: 70 IN | SYSTOLIC BLOOD PRESSURE: 132 MMHG | TEMPERATURE: 98.9 F | DIASTOLIC BLOOD PRESSURE: 70 MMHG | HEART RATE: 98 BPM | OXYGEN SATURATION: 98 % | WEIGHT: 207 LBS | BODY MASS INDEX: 29.63 KG/M2

## 2020-02-17 DIAGNOSIS — L97.529 ISCHEMIC ULCER OF TOE OF LEFT FOOT, WITH UNSPECIFIED SEVERITY (HCC): Primary | ICD-10-CM

## 2020-02-17 DIAGNOSIS — I73.9 PAD (PERIPHERAL ARTERY DISEASE) (HCC): ICD-10-CM

## 2020-02-17 DIAGNOSIS — I70.90 ARTERIAL OCCLUSION: Primary | ICD-10-CM

## 2020-02-17 DIAGNOSIS — I70.90 ARTERIAL OCCLUSION: ICD-10-CM

## 2020-02-17 LAB
ALBUMIN SERPL BCP-MCNC: 3.4 G/DL (ref 3.5–5)
ALP SERPL-CCNC: 70 U/L (ref 46–116)
ALT SERPL W P-5'-P-CCNC: 25 U/L (ref 12–78)
ANION GAP SERPL CALCULATED.3IONS-SCNC: 6 MMOL/L (ref 4–13)
APTT PPP: 26 SECONDS (ref 25–32)
APTT PPP: 47 SECONDS (ref 23–37)
AST SERPL W P-5'-P-CCNC: 9 U/L (ref 5–45)
ATRIAL RATE: 63 BPM
BASOPHILS # BLD AUTO: 0.05 THOUSANDS/ΜL (ref 0–0.1)
BASOPHILS NFR BLD AUTO: 0 % (ref 0–1)
BILIRUB SERPL-MCNC: 0.3 MG/DL (ref 0.2–1)
BUN SERPL-MCNC: 12 MG/DL (ref 5–25)
CALCIUM SERPL-MCNC: 9.4 MG/DL (ref 8.3–10.1)
CHLORIDE SERPL-SCNC: 104 MMOL/L (ref 100–108)
CO2 SERPL-SCNC: 29 MMOL/L (ref 21–32)
CREAT SERPL-MCNC: 0.96 MG/DL (ref 0.6–1.3)
EOSINOPHIL # BLD AUTO: 0.14 THOUSAND/ΜL (ref 0–0.61)
EOSINOPHIL NFR BLD AUTO: 1 % (ref 0–6)
ERYTHROCYTE [DISTWIDTH] IN BLOOD BY AUTOMATED COUNT: 13.9 % (ref 11.6–15.1)
ERYTHROCYTE [DISTWIDTH] IN BLOOD BY AUTOMATED COUNT: 14.1 % (ref 11.6–15.1)
FIBRINOGEN PPP-MCNC: 411 MG/DL (ref 227–495)
GFR SERPL CREATININE-BSD FRML MDRD: 84 ML/MIN/1.73SQ M
GLUCOSE SERPL-MCNC: 99 MG/DL (ref 65–140)
HCT VFR BLD AUTO: 50 % (ref 36.5–49.3)
HCT VFR BLD AUTO: 53.1 % (ref 36.5–49.3)
HGB BLD-MCNC: 16.6 G/DL (ref 12–17)
HGB BLD-MCNC: 18.1 G/DL (ref 12–17)
IMM GRANULOCYTES # BLD AUTO: 0.04 THOUSAND/UL (ref 0–0.2)
IMM GRANULOCYTES NFR BLD AUTO: 0 % (ref 0–2)
INR PPP: 1 (ref 0.91–1.09)
INR PPP: 1.13 (ref 0.84–1.19)
LYMPHOCYTES # BLD AUTO: 3.77 THOUSANDS/ΜL (ref 0.6–4.47)
LYMPHOCYTES NFR BLD AUTO: 27 % (ref 14–44)
MCH RBC QN AUTO: 33.3 PG (ref 26.8–34.3)
MCH RBC QN AUTO: 33.8 PG (ref 26.8–34.3)
MCHC RBC AUTO-ENTMCNC: 33.2 G/DL (ref 31.4–37.4)
MCHC RBC AUTO-ENTMCNC: 34.1 G/DL (ref 31.4–37.4)
MCV RBC AUTO: 100 FL (ref 82–98)
MCV RBC AUTO: 99 FL (ref 82–98)
MONOCYTES # BLD AUTO: 0.78 THOUSAND/ΜL (ref 0.17–1.22)
MONOCYTES NFR BLD AUTO: 6 % (ref 4–12)
NEUTROPHILS # BLD AUTO: 9.32 THOUSANDS/ΜL (ref 1.85–7.62)
NEUTS SEG NFR BLD AUTO: 66 % (ref 43–75)
NRBC BLD AUTO-RTO: 0 /100 WBCS
P AXIS: 52 DEGREES
PLATELET # BLD AUTO: 214 THOUSANDS/UL (ref 149–390)
PLATELET # BLD AUTO: 238 THOUSANDS/UL (ref 149–390)
PMV BLD AUTO: 10.7 FL (ref 8.9–12.7)
PMV BLD AUTO: 11.1 FL (ref 8.9–12.7)
POTASSIUM SERPL-SCNC: 3.5 MMOL/L (ref 3.5–5.3)
PR INTERVAL: 136 MS
PROT SERPL-MCNC: 7.4 G/DL (ref 6.4–8.2)
PROTHROMBIN TIME: 10.7 SECONDS (ref 9.8–12)
PROTHROMBIN TIME: 14.1 SECONDS (ref 11.6–14.5)
QRS AXIS: -29 DEGREES
QRSD INTERVAL: 76 MS
QT INTERVAL: 416 MS
QTC INTERVAL: 425 MS
RBC # BLD AUTO: 4.98 MILLION/UL (ref 3.88–5.62)
RBC # BLD AUTO: 5.36 MILLION/UL (ref 3.88–5.62)
SODIUM SERPL-SCNC: 139 MMOL/L (ref 136–145)
T WAVE AXIS: 9 DEGREES
VENTRICULAR RATE: 63 BPM
WBC # BLD AUTO: 10.46 THOUSAND/UL (ref 4.31–10.16)
WBC # BLD AUTO: 14.1 THOUSAND/UL (ref 4.31–10.16)

## 2020-02-17 PROCEDURE — 99285 EMERGENCY DEPT VISIT HI MDM: CPT | Performed by: EMERGENCY MEDICINE

## 2020-02-17 PROCEDURE — 85384 FIBRINOGEN ACTIVITY: CPT | Performed by: SURGERY

## 2020-02-17 PROCEDURE — 96374 THER/PROPH/DIAG INJ IV PUSH: CPT

## 2020-02-17 PROCEDURE — 85610 PROTHROMBIN TIME: CPT | Performed by: EMERGENCY MEDICINE

## 2020-02-17 PROCEDURE — 85730 THROMBOPLASTIN TIME PARTIAL: CPT | Performed by: FAMILY MEDICINE

## 2020-02-17 PROCEDURE — 99213 OFFICE O/P EST LOW 20 MIN: CPT | Performed by: FAMILY MEDICINE

## 2020-02-17 PROCEDURE — 80053 COMPREHEN METABOLIC PANEL: CPT | Performed by: EMERGENCY MEDICINE

## 2020-02-17 PROCEDURE — 85027 COMPLETE CBC AUTOMATED: CPT | Performed by: FAMILY MEDICINE

## 2020-02-17 PROCEDURE — 99285 EMERGENCY DEPT VISIT HI MDM: CPT

## 2020-02-17 PROCEDURE — 93926 LOWER EXTREMITY STUDY: CPT

## 2020-02-17 PROCEDURE — 73706 CT ANGIO LWR EXTR W/O&W/DYE: CPT

## 2020-02-17 PROCEDURE — 96375 TX/PRO/DX INJ NEW DRUG ADDON: CPT

## 2020-02-17 PROCEDURE — 85610 PROTHROMBIN TIME: CPT | Performed by: FAMILY MEDICINE

## 2020-02-17 PROCEDURE — 1111F DSCHRG MED/CURRENT MED MERGE: CPT | Performed by: FAMILY MEDICINE

## 2020-02-17 PROCEDURE — 36415 COLL VENOUS BLD VENIPUNCTURE: CPT | Performed by: EMERGENCY MEDICINE

## 2020-02-17 PROCEDURE — 96361 HYDRATE IV INFUSION ADD-ON: CPT

## 2020-02-17 PROCEDURE — 85730 THROMBOPLASTIN TIME PARTIAL: CPT | Performed by: EMERGENCY MEDICINE

## 2020-02-17 PROCEDURE — 93010 ELECTROCARDIOGRAM REPORT: CPT | Performed by: INTERNAL MEDICINE

## 2020-02-17 PROCEDURE — 93005 ELECTROCARDIOGRAM TRACING: CPT

## 2020-02-17 PROCEDURE — 85025 COMPLETE CBC W/AUTO DIFF WBC: CPT | Performed by: EMERGENCY MEDICINE

## 2020-02-17 RX ORDER — RANOLAZINE 500 MG/1
500 TABLET, EXTENDED RELEASE ORAL 2 TIMES DAILY
Status: DISCONTINUED | OUTPATIENT
Start: 2020-02-17 | End: 2020-02-21 | Stop reason: HOSPADM

## 2020-02-17 RX ORDER — HYDROMORPHONE HCL/PF 1 MG/ML
0.5 SYRINGE (ML) INJECTION
Status: DISCONTINUED | OUTPATIENT
Start: 2020-02-17 | End: 2020-02-21 | Stop reason: HOSPADM

## 2020-02-17 RX ORDER — ATORVASTATIN CALCIUM 80 MG/1
80 TABLET, FILM COATED ORAL EVERY EVENING
Status: DISCONTINUED | OUTPATIENT
Start: 2020-02-17 | End: 2020-02-21 | Stop reason: HOSPADM

## 2020-02-17 RX ORDER — CLOPIDOGREL BISULFATE 75 MG/1
75 TABLET ORAL DAILY
Status: DISCONTINUED | OUTPATIENT
Start: 2020-02-18 | End: 2020-02-21 | Stop reason: HOSPADM

## 2020-02-17 RX ORDER — ATORVASTATIN CALCIUM 80 MG/1
80 TABLET, FILM COATED ORAL EVERY EVENING
Status: CANCELLED | OUTPATIENT
Start: 2020-02-17

## 2020-02-17 RX ORDER — HEPARIN SODIUM 10000 [USP'U]/100ML
3-30 INJECTION, SOLUTION INTRAVENOUS
Status: DISCONTINUED | OUTPATIENT
Start: 2020-02-17 | End: 2020-02-18

## 2020-02-17 RX ORDER — METOPROLOL SUCCINATE 25 MG/1
25 TABLET, EXTENDED RELEASE ORAL DAILY
Status: DISCONTINUED | OUTPATIENT
Start: 2020-02-18 | End: 2020-02-21 | Stop reason: HOSPADM

## 2020-02-17 RX ORDER — HEPARIN SODIUM 10000 [USP'U]/100ML
30 INJECTION, SOLUTION INTRAVENOUS CONTINUOUS
Status: DISCONTINUED | OUTPATIENT
Start: 2020-02-17 | End: 2020-02-17

## 2020-02-17 RX ORDER — HYDROMORPHONE HCL/PF 1 MG/ML
1 SYRINGE (ML) INJECTION ONCE
Status: COMPLETED | OUTPATIENT
Start: 2020-02-17 | End: 2020-02-17

## 2020-02-17 RX ORDER — ASPIRIN 81 MG/1
81 TABLET, CHEWABLE ORAL DAILY
Status: DISCONTINUED | OUTPATIENT
Start: 2020-02-18 | End: 2020-02-21 | Stop reason: HOSPADM

## 2020-02-17 RX ORDER — CLOPIDOGREL BISULFATE 75 MG/1
75 TABLET ORAL DAILY
Status: CANCELLED | OUTPATIENT
Start: 2020-02-18

## 2020-02-17 RX ORDER — NITROGLYCERIN 0.4 MG/1
0.4 TABLET SUBLINGUAL
Status: CANCELLED | OUTPATIENT
Start: 2020-02-17

## 2020-02-17 RX ORDER — OXYCODONE HYDROCHLORIDE 10 MG/1
10 TABLET ORAL EVERY 4 HOURS PRN
Status: DISCONTINUED | OUTPATIENT
Start: 2020-02-17 | End: 2020-02-21 | Stop reason: HOSPADM

## 2020-02-17 RX ORDER — RANOLAZINE 500 MG/1
500 TABLET, EXTENDED RELEASE ORAL 2 TIMES DAILY
Status: CANCELLED | OUTPATIENT
Start: 2020-02-17

## 2020-02-17 RX ORDER — HEPARIN SODIUM 10000 [USP'U]/100ML
30 INJECTION, SOLUTION INTRAVENOUS CONTINUOUS
Status: DISCONTINUED | OUTPATIENT
Start: 2020-02-17 | End: 2020-02-17 | Stop reason: HOSPADM

## 2020-02-17 RX ORDER — OXYCODONE HYDROCHLORIDE 5 MG/1
5 TABLET ORAL EVERY 4 HOURS PRN
Status: DISCONTINUED | OUTPATIENT
Start: 2020-02-17 | End: 2020-02-21 | Stop reason: HOSPADM

## 2020-02-17 RX ORDER — MORPHINE SULFATE 4 MG/ML
4 INJECTION, SOLUTION INTRAMUSCULAR; INTRAVENOUS ONCE
Status: COMPLETED | OUTPATIENT
Start: 2020-02-17 | End: 2020-02-17

## 2020-02-17 RX ORDER — TOPIRAMATE 100 MG/1
100 TABLET, FILM COATED ORAL 2 TIMES DAILY
Status: CANCELLED | OUTPATIENT
Start: 2020-02-17

## 2020-02-17 RX ORDER — NICOTINE 21 MG/24HR
1 PATCH, TRANSDERMAL 24 HOURS TRANSDERMAL DAILY
Status: CANCELLED | OUTPATIENT
Start: 2020-02-18

## 2020-02-17 RX ORDER — METOPROLOL SUCCINATE 25 MG/1
25 TABLET, EXTENDED RELEASE ORAL DAILY
Status: CANCELLED | OUTPATIENT
Start: 2020-02-18

## 2020-02-17 RX ORDER — TOPIRAMATE 100 MG/1
100 TABLET, FILM COATED ORAL 2 TIMES DAILY
Status: DISCONTINUED | OUTPATIENT
Start: 2020-02-17 | End: 2020-02-21 | Stop reason: HOSPADM

## 2020-02-17 RX ORDER — ACETAMINOPHEN 325 MG/1
650 TABLET ORAL EVERY 6 HOURS PRN
Status: DISCONTINUED | OUTPATIENT
Start: 2020-02-17 | End: 2020-02-21 | Stop reason: HOSPADM

## 2020-02-17 RX ORDER — ASPIRIN 81 MG/1
81 TABLET, CHEWABLE ORAL DAILY
Status: CANCELLED | OUTPATIENT
Start: 2020-02-18

## 2020-02-17 RX ADMIN — SODIUM CHLORIDE 1000 ML: 0.9 INJECTION, SOLUTION INTRAVENOUS at 15:28

## 2020-02-17 RX ADMIN — HEPARIN SODIUM AND DEXTROSE 30 UNITS/KG/HR: 10000; 5 INJECTION INTRAVENOUS at 17:19

## 2020-02-17 RX ADMIN — ATORVASTATIN CALCIUM 80 MG: 80 TABLET, FILM COATED ORAL at 23:04

## 2020-02-17 RX ADMIN — MORPHINE SULFATE 4 MG: 4 INJECTION INTRAVENOUS at 15:25

## 2020-02-17 RX ADMIN — HYDROMORPHONE HYDROCHLORIDE 1 MG: 1 INJECTION, SOLUTION INTRAMUSCULAR; INTRAVENOUS; SUBCUTANEOUS at 16:58

## 2020-02-17 RX ADMIN — HEPARIN SODIUM AND DEXTROSE 18 UNITS/KG/HR: 10000; 5 INJECTION INTRAVENOUS at 22:18

## 2020-02-17 RX ADMIN — RANOLAZINE 500 MG: 500 TABLET, FILM COATED, EXTENDED RELEASE ORAL at 23:04

## 2020-02-17 RX ADMIN — IOHEXOL 120 ML: 350 INJECTION, SOLUTION INTRAVENOUS at 18:08

## 2020-02-17 RX ADMIN — TOPIRAMATE 100 MG: 100 TABLET, FILM COATED ORAL at 23:04

## 2020-02-17 NOTE — ASSESSMENT & PLAN NOTE
Patient has had ulcer on the left foot for 3 weeks  Since last Thursday, he has had no feeling in his left foot at all  He can still move his foot and bear weight on it   ARIELA done in November was normal

## 2020-02-17 NOTE — ASSESSMENT & PLAN NOTE
Patient has had 3 cardiac events in the past year  One in August, one in September, and one in December  Continue home medications of Plavix 75 mg p o  Daily, metoprolol 25 mg p o   Daily, Nitrostat as needed, and Ranexa 500 mg q 12 hours

## 2020-02-17 NOTE — ED PROVIDER NOTES
History  Chief Complaint   Patient presents with    Foot Pain     L foot pain for about 3 weeks  states sent from [place] for eval for blood clots     Patient has a significant history of coronary artery disease, continues to smoke cigarettes  Patient has been having pain in the left lower extremity and foot for about 3 weeks now  He has been treated with antibiotics for presumed cellulitis and a poorly healing ulcer without improvement  Patient was sent in from the office today for evaluation in the ED, possible blood clot  Patient is on aspirin and Plavix  Has no history of DVT  Patient states the pain has been getting worse and he has been chewing up a lot of [address]          Prior to Admission Medications   Prescriptions Last Dose Informant Patient Reported?  Taking?   aspirin 81 mg chewable tablet   No No   Sig: Chew 1 tablet (81 mg total) daily   atorvastatin (LIPITOR) 80 mg tablet   No No   Sig: Take 1 tablet (80 mg total) by mouth every evening   clopidogrel (PLAVIX) 75 mg tablet   No No   Sig: Take 1 tablet (75 mg total) by mouth daily   metoprolol succinate (TOPROL-XL) 25 mg 24 hr tablet   No No   Sig: Take 1 tablet (25 mg total) by mouth daily   nitroglycerin (NITROSTAT) 0 4 mg SL tablet   No No   Sig: Place 1 tablet (0 4 mg total) under the tongue every 5 (five) minutes as needed for chest pain   ranolazine (RANEXA) 500 mg 12 hr tablet   No No   Sig: Take 1 tablet (500 mg total) by mouth 2 (two) times a day   topiramate (TOPAMAX) 100 mg tablet  Self No No   Sig: Take 1 tablet (100 mg total) by mouth 2 (two) times a day      Facility-Administered Medications: None       Past Medical History:   Diagnosis Date    [facility]     CAD (coronary artery disease)     History of ventricular fibrillation 12/27/2019    in setting in-stent thrombosis; received defibrillation with 200J x4; received less than 30s chest compressions before ROSC achieved    Schizo-affective psychosis ([address] )     Tobacco user        Past Surgical History:   Procedure Laterality Date    BACK SURGERY      CARDIAC SURGERY      stent placement    CHOLECYSTECTOMY      KNEE SURGERY Right        Family History   Problem Relation Age of Onset    Heart disease Mother     Cancer Mother     Heart disease Father     Cancer Father     Multiple sclerosis Sister     Cancer Sister      I have reviewed and agree with the history as documented  Social History     Tobacco Use    Smoking status: Heavy Tobacco Smoker     Packs/day: 1 50     Types: Cigarettes    Smokeless tobacco: Never Used   Substance Use Topics    Alcohol use: Not Currently     Frequency: Never    Drug use: Never       Review of Systems   Constitutional: Negative for chills and fever  HENT: Positive for dental problem  Negative for congestion and sore throat  Eyes: Negative for visual disturbance  Respiratory: Negative for shortness of breath  Cardiovascular: Positive for leg swelling  Negative for chest pain and palpitations  Gastrointestinal: Negative for abdominal pain and vomiting  Genitourinary: Negative for dysuria  Musculoskeletal: Positive for arthralgias, gait problem, joint swelling and myalgias  Skin: Positive for color change and rash  Neurological: Positive for numbness  Hematological: Does not bruise/bleed easily  Psychiatric/Behavioral: Negative for confusion  All other systems reviewed and are negative  Physical Exam  Physical Exam   Constitutional: He is oriented to person, place, and time  He appears well-developed and well-nourished  HENT:   Head: Normocephalic and atraumatic  Mouth/Throat: Oropharynx is clear and moist    Eyes: Conjunctivae are normal    Neck: Normal range of motion  Neck supple  Cardiovascular: Normal rate, regular rhythm and normal heart sounds  No distal pulses palpable   Pulmonary/Chest: Effort normal and breath sounds normal    Abdominal: Soft  There is no tenderness  Musculoskeletal: Normal range of motion  Patient has mild edema of the left lower extremity with a small amount of erythema  There is a temperature change half way down the lower leg to the foot  Patient has decreased sensation in the foot   Neurological: He is alert and oriented to person, place, and time  Skin: Skin is dry  Capillary refill takes more than 3 seconds  There is a poorly healing ulcer on the lateral aspect of the 5th MTP joint area   Psychiatric: He has a normal mood and affect  His behavior is normal    Nursing note and vitals reviewed        Vital Signs  ED Triage Vitals   Temperature Pulse Respirations Blood Pressure SpO2   02/17/20 1437 02/17/20 1437 02/17/20 1435 02/17/20 1437 02/17/20 1435   98 9 °F (37 2 °C) 92 (!) 24 148/79 97 %      Temp Source Heart Rate Source Patient Position - Orthostatic VS BP Location FiO2 (%)   02/17/20 1437 02/17/20 1437 02/17/20 1435 02/17/20 1435 --   Tympanic Monitor Sitting Left arm       Pain Score       02/17/20 1435       Worst Possible Pain           Vitals:    02/17/20 1435 02/17/20 1437   BP:  148/79   Pulse:  92   Patient Position - Orthostatic VS: Sitting Sitting         Visual Acuity      ED Medications  Medications   HYDROmorphone (DILAUDID) injection 1 mg (has no administration in time range)   heparin (porcine) 25,000 units in 250 mL infusion (premix) (has no administration in time range)   sodium chloride 0 9 % bolus 1,000 mL (1,000 mL Intravenous New Bag 2/17/20 1528)   morphine (PF) 4 mg/mL injection 4 mg (4 mg Intravenous Given 2/17/20 1525)       Diagnostic Studies  Results Reviewed     Procedure Component Value Units Date/Time    APTT [[id number]]     Lab Status:  No result Specimen:  Blood     Protime-INR [[id number]]  (Normal) Collected:  02/17/20 1527    Lab Status:  Final result Specimen:  Blood from Arm, Right Updated:  02/17/20 1614     Protime 10 7 seconds      INR 1 00    APTT [[id number]]  (Normal) Collected:  02/17/20 1527    Lab Status:  Final result Specimen:  Blood from Arm, Right Updated:  02/17/20 1614     PTT 26 seconds     Comprehensive metabolic panel [[id number]]  (Abnormal) Collected:  02/17/20 1527    Lab Status:  Final result Specimen:  Blood from Arm, Right Updated:  02/17/20 1557     Sodium 139 mmol/L      Potassium 3 5 mmol/L      Chloride 104 mmol/L      CO2 29 mmol/L      ANION GAP 6 mmol/L      BUN 12 mg/dL      Creatinine 0 96 mg/dL      Glucose 99 mg/dL      Calcium 9 4 mg/dL      AST 9 U/L      ALT 25 U/L      Alkaline Phosphatase 70 U/L      Total Protein 7 4 g/dL      Albumin 3 4 g/dL      Total Bilirubin 0 30 mg/dL      eGFR 84 ml/min/1 73sq m     Narrative:       National Kidney Disease Foundation guidelines for Chronic Kidney Disease (CKD):     Stage 1 with normal or high GFR (GFR > 90 mL/min/1 73 square meters)    Stage 2 Mild CKD (GFR = 60-89 mL/min/1 73 square meters)    Stage 3A Moderate CKD (GFR = 45-59 mL/min/1 73 square meters)    Stage 3B Moderate CKD (GFR = 30-44 mL/min/1 73 square meters)    Stage 4 Severe CKD (GFR = 15-29 mL/min/1 73 square meters)    Stage 5 End Stage CKD (GFR <15 mL/min/1 73 square meters)  Note: GFR calculation is accurate only with a steady state creatinine    CBC and differential [[id number]]  (Abnormal) Collected:  02/17/20 1527    Lab Status:  Final result Specimen:  Blood from Arm, Right Updated:  02/17/20 1537     WBC 14 10 Thousand/uL      RBC 5 36 Million/uL      Hemoglobin 18 1 g/dL      Hematocrit 53 1 %      MCV 99 fL      MCH 33 8 pg      MCHC 34 1 g/dL      RDW 13 9 %      MPV 10 7 fL      Platelets 827 Thousands/uL      nRBC 0 /100 WBCs      Neutrophils Relative 66 %      Immat GRANS % 0 %      Lymphocytes Relative 27 %      Monocytes Relative 6 %      Eosinophils Relative 1 %      Basophils Relative 0 %      Neutrophils Absolute 9 32 Thousands/µL      Immature Grans Absolute 0 04 Thousand/uL      Lymphocytes Absolute 3 77 Thousands/µL      Monocytes Absolute 0 78 Thousand/µL      Eosinophils Absolute 0 14 Thousand/µL      Basophils Absolute 0 05 Thousands/µL                  VAS lower limb venous duplex study, unilateral/limited    (Results Pending)   VAS lower limb arterial duplex, limited, unilateral    (Results Pending)   CTA lower extremity left w wo contrast    (Results Pending)              Procedures  ECG 12 Lead Documentation Only  Date/Time: 2/17/2020 3:21 PM  Performed by: Collin No MD  Authorized by: Collin No MD     Indications / Diagnosis:  Leg pain, cellulitis  ECG reviewed by me, the ED Provider: yes    Patient location:  ED  Interpretation:     Interpretation: non-specific    Rate:     ECG rate:  63    ECG rate assessment: normal    Rhythm:     Rhythm: sinus rhythm    Ectopy:     Ectopy: none    QRS:     QRS axis:  Normal    QRS intervals:  Normal  Conduction:     Conduction: normal    ST segments:     ST segments:  Normal  T waves:     T waves: normal    Other findings:     Other findings: LAE      Other findings comment:  Low voltage             ED Course                               MDM  Number of Diagnoses or Management Options  Diagnosis management comments: Patient has known atherosclerotic heart disease with continued smoking  He likely has significant peripheral artery disease  Will check both venous and arterial Dopplers  Bedside Doppler exam was performed by me  Patient has good arterial signal at the common femoral   There is a weaker signal present at the popliteal and in Jorge Alberto's canal   No arterial signals are picked up in the lower extremity or foot        Disposition  Final diagnoses:   None     ED Disposition     None      Follow-up Information    None         Patient's Medications   Discharge Prescriptions    No medications on file     No discharge procedures on file      PDMP Review       Value Time User    PDMP Reviewed  Yes 12/29/2019  8:17 AM Anu Le PA-C          ED Provider  Electronically Signed by Zuly Haro MD  02/17/20 2523

## 2020-02-17 NOTE — ED CARE HANDOFF
Emergency Department Sign Out Note        Sign out and transfer of care from Dr Jasvir Pavon  See Separate Emergency Department note  The patient, Faye Johnston, was evaluated by the previous provider for pain in left lower extremity  Workup Completed:  Blood work, arterial study was performed and is awaiting a CTA of his left lower extremity    ED Course / Workup Pending (followup): Patient was given pain medications started on heparin drip  Procedures  MDM  Number of Diagnoses or Management Options  Arterial occlusion:   Diagnosis management comments: Spoke to the vascular surgeon on-call given the results of the arterial study  He agrees heparin drip and he is going to be transferred to [facility] for further treatment evaluation  I discussed this with the patient, I answered all questions the best my ability  The patient verbalizes understanding and is in agreement with the assessment and plan  Amount and/or Complexity of Data Reviewed  Clinical lab tests: reviewed  Tests in the radiology section of CPT®: reviewed        Disposition  Final diagnoses:   Arterial occlusion     Time reflects when diagnosis was documented in both MDM as applicable and the Disposition within this note     Time User Action Codes Description Comment    2/17/2020  6:19 PM Mendy Pederson Add [I70 90] Arterial occlusion       ED Disposition     ED Disposition Condition Date/Time Comment    Transfer to Another [facility] Feb 17, 2020  6:19 PM Kassandra Guerrero should be transferred out to [facility]          MD Documentation      Most Recent Value   Patient Condition  The patient has been stabilized such that within reasonable medical probability, no material deterioration of the patient condition or the condition of the unborn child(thony) is likely to result from the transfer   Reason for Transfer  Level of Care needed not available at this facility   Benefits of Transfer  Specialized equipment and/or services available at the receiving facility (Include comment)________________________   Risks of Transfer  Potential for delay in receiving treatment   Accepting Physician  Dr Suad Ambrosio Name, Milagro Almeida   Sending MD Dr Ximena Nicholas      RN Documentation      [facility] Name, Milagro Almeida      Follow-up Information    None       Patient's Medications   Discharge Prescriptions    No medications on file     No discharge procedures on file         ED Provider  Electronically Signed by     Stephan Braxton MD  02/17/20 7769

## 2020-02-18 ENCOUNTER — APPOINTMENT (INPATIENT)
Dept: RADIOLOGY | Facility: HOSPITAL | Age: 65
DRG: 271 | End: 2020-02-18
Payer: COMMERCIAL

## 2020-02-18 LAB
ANION GAP SERPL CALCULATED.3IONS-SCNC: 5 MMOL/L (ref 4–13)
APTT PPP: 51 SECONDS (ref 23–37)
BUN SERPL-MCNC: 9 MG/DL (ref 5–25)
CALCIUM SERPL-MCNC: 8.9 MG/DL (ref 8.3–10.1)
CHLORIDE SERPL-SCNC: 110 MMOL/L (ref 100–108)
CO2 SERPL-SCNC: 27 MMOL/L (ref 21–32)
CREAT SERPL-MCNC: 0.7 MG/DL (ref 0.6–1.3)
ERYTHROCYTE [DISTWIDTH] IN BLOOD BY AUTOMATED COUNT: 14.2 % (ref 11.6–15.1)
GFR SERPL CREATININE-BSD FRML MDRD: 101 ML/MIN/1.73SQ M
GLUCOSE SERPL-MCNC: 95 MG/DL (ref 65–140)
HCT VFR BLD AUTO: 49 % (ref 36.5–49.3)
HGB BLD-MCNC: 16.2 G/DL (ref 12–17)
KCT BLD-ACNC: 186 SEC (ref 89–137)
KCT BLD-ACNC: 186 SEC (ref 89–137)
MCH RBC QN AUTO: 32.6 PG (ref 26.8–34.3)
MCHC RBC AUTO-ENTMCNC: 33.1 G/DL (ref 31.4–37.4)
MCV RBC AUTO: 99 FL (ref 82–98)
PLATELET # BLD AUTO: 197 THOUSANDS/UL (ref 149–390)
PMV BLD AUTO: 11.8 FL (ref 8.9–12.7)
POTASSIUM SERPL-SCNC: 3.4 MMOL/L (ref 3.5–5.3)
RBC # BLD AUTO: 4.97 MILLION/UL (ref 3.88–5.62)
SODIUM SERPL-SCNC: 142 MMOL/L (ref 136–145)
SPECIMEN SOURCE: ABNORMAL
SPECIMEN SOURCE: ABNORMAL
WBC # BLD AUTO: 7.66 THOUSAND/UL (ref 4.31–10.16)

## 2020-02-18 PROCEDURE — 37185 PRIM ART M-THRMBC SBSQ VSL: CPT | Performed by: STUDENT IN AN ORGANIZED HEALTH CARE EDUCATION/TRAINING PROGRAM

## 2020-02-18 PROCEDURE — 99153 MOD SED SAME PHYS/QHP EA: CPT

## 2020-02-18 PROCEDURE — 99024 POSTOP FOLLOW-UP VISIT: CPT | Performed by: STUDENT IN AN ORGANIZED HEALTH CARE EDUCATION/TRAINING PROGRAM

## 2020-02-18 PROCEDURE — 73630 X-RAY EXAM OF FOOT: CPT

## 2020-02-18 PROCEDURE — 99222 1ST HOSP IP/OBS MODERATE 55: CPT | Performed by: PODIATRIST

## 2020-02-18 PROCEDURE — ND001 PR NO DOCUMENTATION: Performed by: SURGERY

## 2020-02-18 PROCEDURE — 37185 PRIM ART M-THRMBC SBSQ VSL: CPT

## 2020-02-18 PROCEDURE — C1760 CLOSURE DEV, VASC: HCPCS

## 2020-02-18 PROCEDURE — 76937 US GUIDE VASCULAR ACCESS: CPT

## 2020-02-18 PROCEDURE — C1725 CATH, TRANSLUMIN NON-LASER: HCPCS

## 2020-02-18 PROCEDURE — 37226 PR REVASCULARIZE FEM/POP ARTERY,ANGIOPLASTY/STENT: CPT | Performed by: STUDENT IN AN ORGANIZED HEALTH CARE EDUCATION/TRAINING PROGRAM

## 2020-02-18 PROCEDURE — 80048 BASIC METABOLIC PNL TOTAL CA: CPT | Performed by: FAMILY MEDICINE

## 2020-02-18 PROCEDURE — 047Y3DZ DILATION OF LOWER ARTERY WITH INTRALUMINAL DEVICE, PERCUTANEOUS APPROACH: ICD-10-PCS | Performed by: STUDENT IN AN ORGANIZED HEALTH CARE EDUCATION/TRAINING PROGRAM

## 2020-02-18 PROCEDURE — C1757 CATH, THROMBECTOMY/EMBOLECT: HCPCS

## 2020-02-18 PROCEDURE — C1769 GUIDE WIRE: HCPCS

## 2020-02-18 PROCEDURE — 85730 THROMBOPLASTIN TIME PARTIAL: CPT | Performed by: FAMILY MEDICINE

## 2020-02-18 PROCEDURE — 85347 COAGULATION TIME ACTIVATED: CPT

## 2020-02-18 PROCEDURE — 99223 1ST HOSP IP/OBS HIGH 75: CPT | Performed by: SURGERY

## 2020-02-18 PROCEDURE — C1887 CATHETER, GUIDING: HCPCS

## 2020-02-18 PROCEDURE — 93005 ELECTROCARDIOGRAM TRACING: CPT

## 2020-02-18 PROCEDURE — 85027 COMPLETE CBC AUTOMATED: CPT | Performed by: FAMILY MEDICINE

## 2020-02-18 PROCEDURE — 99222 1ST HOSP IP/OBS MODERATE 55: CPT | Performed by: FAMILY MEDICINE

## 2020-02-18 PROCEDURE — C1894 INTRO/SHEATH, NON-LASER: HCPCS

## 2020-02-18 PROCEDURE — 99152 MOD SED SAME PHYS/QHP 5/>YRS: CPT

## 2020-02-18 PROCEDURE — 37228 HB TIB/PER REVASC W/TLA (AKA CPT37228): CPT

## 2020-02-18 PROCEDURE — 37184 PRIM ART M-THRMBC 1ST VSL: CPT

## 2020-02-18 PROCEDURE — 93926 LOWER EXTREMITY STUDY: CPT | Performed by: SURGERY

## 2020-02-18 PROCEDURE — 99152 MOD SED SAME PHYS/QHP 5/>YRS: CPT | Performed by: STUDENT IN AN ORGANIZED HEALTH CARE EDUCATION/TRAINING PROGRAM

## 2020-02-18 PROCEDURE — C1884 EMBOLIZATION PROTECT SYST: HCPCS

## 2020-02-18 PROCEDURE — 76937 US GUIDE VASCULAR ACCESS: CPT | Performed by: STUDENT IN AN ORGANIZED HEALTH CARE EDUCATION/TRAINING PROGRAM

## 2020-02-18 PROCEDURE — 37184 PRIM ART M-THRMBC 1ST VSL: CPT | Performed by: STUDENT IN AN ORGANIZED HEALTH CARE EDUCATION/TRAINING PROGRAM

## 2020-02-18 PROCEDURE — C1876 STENT, NON-COA/NON-COV W/DEL: HCPCS

## 2020-02-18 PROCEDURE — 04CL3ZZ EXTIRPATION OF MATTER FROM LEFT FEMORAL ARTERY, PERCUTANEOUS APPROACH: ICD-10-PCS | Performed by: STUDENT IN AN ORGANIZED HEALTH CARE EDUCATION/TRAINING PROGRAM

## 2020-02-18 PROCEDURE — G9198 NO ORDER FOR CEPH NO REASON: HCPCS | Performed by: STUDENT IN AN ORGANIZED HEALTH CARE EDUCATION/TRAINING PROGRAM

## 2020-02-18 PROCEDURE — 93922 UPR/L XTREMITY ART 2 LEVELS: CPT | Performed by: SURGERY

## 2020-02-18 PROCEDURE — 75710 ARTERY X-RAYS ARM/LEG: CPT

## 2020-02-18 PROCEDURE — 75625 CONTRAST EXAM ABDOMINL AORTA: CPT

## 2020-02-18 PROCEDURE — 37226 HB FEM/POPL REVASC W/STENT: CPT

## 2020-02-18 RX ORDER — MIDAZOLAM HYDROCHLORIDE 2 MG/2ML
INJECTION, SOLUTION INTRAMUSCULAR; INTRAVENOUS CODE/TRAUMA/SEDATION MEDICATION
Status: COMPLETED | OUTPATIENT
Start: 2020-02-18 | End: 2020-02-18

## 2020-02-18 RX ORDER — SODIUM CHLORIDE 9 MG/ML
125 INJECTION, SOLUTION INTRAVENOUS CONTINUOUS
Status: DISCONTINUED | OUTPATIENT
Start: 2020-02-18 | End: 2020-02-19

## 2020-02-18 RX ORDER — HYDROMORPHONE HYDROCHLORIDE 4 MG/ML
INJECTION, SOLUTION INTRAMUSCULAR; INTRAVENOUS; SUBCUTANEOUS CODE/TRAUMA/SEDATION MEDICATION
Status: COMPLETED | OUTPATIENT
Start: 2020-02-18 | End: 2020-02-18

## 2020-02-18 RX ORDER — POTASSIUM CHLORIDE 20 MEQ/1
40 TABLET, EXTENDED RELEASE ORAL ONCE
Status: COMPLETED | OUTPATIENT
Start: 2020-02-18 | End: 2020-02-18

## 2020-02-18 RX ORDER — LIDOCAINE WITH 8.4% SOD BICARB 0.9%(10ML)
SYRINGE (ML) INJECTION CODE/TRAUMA/SEDATION MEDICATION
Status: COMPLETED | OUTPATIENT
Start: 2020-02-18 | End: 2020-02-18

## 2020-02-18 RX ORDER — FENTANYL CITRATE 50 UG/ML
INJECTION, SOLUTION INTRAMUSCULAR; INTRAVENOUS CODE/TRAUMA/SEDATION MEDICATION
Status: COMPLETED | OUTPATIENT
Start: 2020-02-18 | End: 2020-02-18

## 2020-02-18 RX ORDER — HEPARIN SODIUM 1000 [USP'U]/ML
INJECTION, SOLUTION INTRAVENOUS; SUBCUTANEOUS CODE/TRAUMA/SEDATION MEDICATION
Status: COMPLETED | OUTPATIENT
Start: 2020-02-18 | End: 2020-02-18

## 2020-02-18 RX ADMIN — CLOPIDOGREL BISULFATE 75 MG: 75 TABLET ORAL at 08:48

## 2020-02-18 RX ADMIN — MIDAZOLAM 0.5 MG: 1 INJECTION INTRAMUSCULAR; INTRAVENOUS at 12:05

## 2020-02-18 RX ADMIN — FENTANYL CITRATE 25 MCG: 50 INJECTION, SOLUTION INTRAMUSCULAR; INTRAVENOUS at 13:14

## 2020-02-18 RX ADMIN — MIDAZOLAM 0.5 MG: 1 INJECTION INTRAMUSCULAR; INTRAVENOUS at 13:25

## 2020-02-18 RX ADMIN — HEPARIN SODIUM 5000 UNITS: 1000 INJECTION INTRAVENOUS; SUBCUTANEOUS at 11:18

## 2020-02-18 RX ADMIN — MIDAZOLAM 0.5 MG: 1 INJECTION INTRAMUSCULAR; INTRAVENOUS at 12:22

## 2020-02-18 RX ADMIN — TOPIRAMATE 100 MG: 100 TABLET, FILM COATED ORAL at 17:40

## 2020-02-18 RX ADMIN — FENTANYL CITRATE 50 MCG: 50 INJECTION, SOLUTION INTRAMUSCULAR; INTRAVENOUS at 13:25

## 2020-02-18 RX ADMIN — RANOLAZINE 500 MG: 500 TABLET, FILM COATED, EXTENDED RELEASE ORAL at 08:52

## 2020-02-18 RX ADMIN — Medication 3 ML: at 10:45

## 2020-02-18 RX ADMIN — HYDROMORPHONE HYDROCHLORIDE 1 MG: 4 INJECTION, SOLUTION INTRAMUSCULAR; INTRAVENOUS; SUBCUTANEOUS at 12:29

## 2020-02-18 RX ADMIN — ATORVASTATIN CALCIUM 80 MG: 80 TABLET, FILM COATED ORAL at 17:40

## 2020-02-18 RX ADMIN — FENTANYL CITRATE 50 MCG: 50 INJECTION, SOLUTION INTRAMUSCULAR; INTRAVENOUS at 10:45

## 2020-02-18 RX ADMIN — ASPIRIN 81 MG 81 MG: 81 TABLET ORAL at 08:48

## 2020-02-18 RX ADMIN — FENTANYL CITRATE 25 MCG: 50 INJECTION, SOLUTION INTRAMUSCULAR; INTRAVENOUS at 11:38

## 2020-02-18 RX ADMIN — FENTANYL CITRATE 25 MCG: 50 INJECTION, SOLUTION INTRAMUSCULAR; INTRAVENOUS at 11:52

## 2020-02-18 RX ADMIN — RANOLAZINE 500 MG: 500 TABLET, FILM COATED, EXTENDED RELEASE ORAL at 17:40

## 2020-02-18 RX ADMIN — OXYCODONE HYDROCHLORIDE 10 MG: 10 TABLET ORAL at 17:50

## 2020-02-18 RX ADMIN — ALTEPLASE 10 MG: 2.2 INJECTION, POWDER, LYOPHILIZED, FOR SOLUTION INTRAVENOUS at 12:26

## 2020-02-18 RX ADMIN — MIDAZOLAM 1 MG: 1 INJECTION INTRAMUSCULAR; INTRAVENOUS at 10:45

## 2020-02-18 RX ADMIN — FENTANYL CITRATE 50 MCG: 50 INJECTION, SOLUTION INTRAMUSCULAR; INTRAVENOUS at 11:59

## 2020-02-18 RX ADMIN — SODIUM CHLORIDE 125 ML/HR: 0.9 INJECTION, SOLUTION INTRAVENOUS at 16:31

## 2020-02-18 RX ADMIN — HEPARIN SODIUM 3000 UNITS: 1000 INJECTION INTRAVENOUS; SUBCUTANEOUS at 12:22

## 2020-02-18 RX ADMIN — POTASSIUM CHLORIDE 40 MEQ: 1500 TABLET, EXTENDED RELEASE ORAL at 08:48

## 2020-02-18 RX ADMIN — IODIXANOL 125 ML: 320 INJECTION, SOLUTION INTRAVASCULAR at 14:42

## 2020-02-18 RX ADMIN — FENTANYL CITRATE 50 MCG: 50 INJECTION, SOLUTION INTRAMUSCULAR; INTRAVENOUS at 12:22

## 2020-02-18 RX ADMIN — TOPIRAMATE 100 MG: 100 TABLET, FILM COATED ORAL at 08:51

## 2020-02-18 RX ADMIN — HEPARIN SODIUM 3000 UNITS: 1000 INJECTION INTRAVENOUS; SUBCUTANEOUS at 13:18

## 2020-02-18 RX ADMIN — HYDROMORPHONE HYDROCHLORIDE 0.5 MG: 1 INJECTION, SOLUTION INTRAMUSCULAR; INTRAVENOUS; SUBCUTANEOUS at 08:49

## 2020-02-18 RX ADMIN — HEPARIN SODIUM AND DEXTROSE 20 UNITS/KG/HR: 10000; 5 INJECTION INTRAVENOUS at 02:50

## 2020-02-18 RX ADMIN — OXYCODONE HYDROCHLORIDE 10 MG: 10 TABLET ORAL at 05:53

## 2020-02-18 NOTE — CONSULTS
Consultation - Vascular Surgery   Merline Fester Desantis 60 y o  male MRN: [id number]  Unit/Bed#: [facility] 806-01 Encounter: [id number]      Assessment/Plan      Assessment:  Patient is a 72-year-old male with 3 weeks of left lower extremity pain  CTA lower extremities with runoff consistent with SFA occlusion, and reconstitution in the popliteal trunk  2/17 LEADS: LLE:  Acute occlusion of distal superficial femoral artery and popliteal artery  Popliteal artery is ectatic measuring 1 0 cm  Ankle/Brachial Index: 0 45, which is ischemic range  Prior 1 0 (11/7/2019) PVR/ PPG tracings are severely dampened  Metatarsal pressure of 45 mmHg  Great toe pressure of 0 mmHg, flat lined  Exam: B/l LE sensory and motor intact with good cap refill  B/l  palpable femoral pulse  B/l dopplerable pop signals  LLE w dopplerable peroneal art signal and PT signal   RLE dopp DP/PT  Plan:  NPO @ midnight  Continue heparin gtt  IR consult for possible lysis tomorrow AM  Continue serial neurovascular checks    History of Present Illness   Physician Requesting Consult: Sylvia Simon MD  Reason for Consult / Principal Problem: LLE pain concerning for acute on   History, ROS and PFSH unobtainable from any source due to none  HPI: Praful Chicas is a 60y o  year old male past medical history of coronary artery disease, mi, status post coronary artery stents, bipolar disorder, who presents with 3 weeks of left lower extremity pain  Patient explained that he has pain in movement of his left lower extremity distal to his knee, nothing seems to make the pain better  Patient still able to bear weight on his leg, ambulating although this has been causing him pain  Patient reported having a myocardial infarction in November of 5326, complicated with stent placement  Patient takes aspirin Plavix at home  Denied taking any other blood thinners  Denied any history of stroke  Denied any history of vascular surgery    Denied any fevers, chills, chest pain or shortness of breath today  Inpatient consult to Vascular Surgery     Performed by  Mariluz Ahuja MD     Authorized by Mariluz Ahuja MD              Review of Systems   Constitutional: Negative for chills and fever  HENT: Negative  Eyes: Negative  Respiratory: Negative  Cardiovascular: Negative  Gastrointestinal: Negative  Endocrine: Negative  Genitourinary: Negative  Musculoskeletal: Negative  Skin: Positive for wound  Exam: B/l LE sensory and motor intact with good cap refill  B/l  palpable femoral pulse  B/l dopplerable pop signals  LLE w dopplerable peroneal art signal and PT signal   RLE dopp DP/PT  Wound on L lateral foot  Allergic/Immunologic: Negative  Neurological: Negative  Hematological: Negative  Psychiatric/Behavioral: Negative  Historical Information   Past Medical History:   Diagnosis Date    B[facility]     CAD (coronary artery disease)     History of ventricular fibrillation 12/27/2019    in setting in-stent thrombosis; received defibrillation with 200J x4; received less than 30s chest compressions before ROSC achieved    Schizo-affective psychosis ([facility] )     Tobacco user      Past Surgical History:   Procedure Laterality Date    BACK SURGERY      CARDIAC SURGERY      stent placement    CHOLECYSTECTOMY      KNEE SURGERY Right      Social History   Social History     Substance and Sexual Activity   Alcohol Use Not Currently    Frequency: Never     Social History     Substance and Sexual Activity   Drug Use Never     Social History     Tobacco Use   Smoking Status Heavy Tobacco Smoker    Packs/day: 1 50    Types: Cigarettes   Smokeless Tobacco Never Used     Family History: non-contributory}    Meds/Allergies   all current active meds have been reviewed  Allergies   Allergen Reactions    Fish-Derived Products Hives    Rice Bean [Phaseolus] Hives       Objective   Vitals:  There were no vitals taken for this visit  ,There is no height or weight on file to calculate BMI  No intake or output data in the 24 hours ending 02/17/20 2116  Invasive Devices     Peripheral Intravenous Line            Peripheral IV 02/17/20 Right Antecubital less than 1 day                Physical Exam   Constitutional: He is oriented to person, place, and time  He appears well-developed and well-nourished  HENT:   Head: Normocephalic and atraumatic  Eyes: Pupils are equal, round, and reactive to light  No scleral icterus  Neck: Normal range of motion  Neck supple  No JVD present  No tracheal deviation present  Cardiovascular: Normal rate  Pulmonary/Chest: Effort normal    Abdominal: Soft  Genitourinary:   Genitourinary Comments: deferred   Musculoskeletal: Normal range of motion  He exhibits no edema  Neurological: He is alert and oriented to person, place, and time  Skin: Skin is warm  Capillary refill takes less than 2 seconds  Psychiatric: He has a normal mood and affect  Vitals reviewed  Lab Results:   I have personally reviewed pertinent reports  , Coags:   Lab Results   Component Value Date    PTT 26 02/17/2020    INR 1 00 02/17/2020   , Creatinine:   Lab Results   Component Value Date    CREATININE 0 96 02/17/2020     Imaging Studies: I have personally reviewed pertinent reports  EKG, Pathology, and Other Studies: I have personally reviewed pertinent reports  VTE Prophylaxis: Sequential compression device Acquanetta Viviana)      Code Status: Level 3 - DNAR and DNI  Advance Directive and Living Will:      Power of :    POLST:      Counseling / Coordination of Care  Counseling/Coordination of Care: Total floor / unit time spent today 30 minutes  Greater than 50% of total time was spent with the patient and / or family counseling and / or coordination of care   A description of the counseling / coordination of care: 30

## 2020-02-18 NOTE — ASSESSMENT & PLAN NOTE
s/p IR LLE Agram w/ pharmacomechanical thrombectomy of SFA/ popliteal & TPT clot  Distal SFA/ proximal popliteal stenting  PTA of proximal TPT with improvement in pain in feet  LLE wound following with podiatry  Continue serial neurovascular checks    Three week history of pain in left foot and chronic nonhealing ulcer  Was being treated for presumed cellulitis previously, with no relief  CTA lower extremity (2/17): Subacute or chronic occlusion of left P1 and P2 segments with reconstitution at P3 segment via collaterals  Bilateral tibial peroneal disease as described above   Moderate focal stenosis at the junction of the right SFA-popliteal artery/adductor canal

## 2020-02-18 NOTE — NURSING NOTE
Ptt 47  However, hepain drip orders were changed and the rate was changed so this ptt is not reflectiive of the current rate  Spoke w/ family med  Ok to increase drip per protocol at this time  Will recheck ptt in 6 hrs from now

## 2020-02-18 NOTE — PLAN OF CARE
Problem: Potential for Falls  Goal: Patient will remain free of falls  Description  INTERVENTIONS:  - Assess patient frequently for physical needs  -  Identify cognitive and physical deficits and behaviors that affect risk of falls  -  Salvo fall precautions as indicated by assessment   - Educate patient/family on patient safety including physical limitations  - Instruct patient to call for assistance with activity based on assessment  - Modify environment to reduce risk of injury  - Consider OT/PT consult to assist with strengthening/mobility  Outcome: Progressing     Problem: Prexisting or High Potential for Compromised Skin Integrity  Goal: Skin integrity is maintained or improved  Description  INTERVENTIONS:  - Identify patients at risk for skin breakdown  - Assess and monitor skin integrity  - Assess and monitor nutrition and hydration status  - Monitor labs   - Assess for incontinence   - Turn and reposition patient  - Assist with mobility/ambulation  - Relieve pressure over bony prominences  - Avoid friction and shearing  - Provide appropriate hygiene as needed including keeping skin clean and dry  - Evaluate need for skin moisturizer/barrier cream  - Collaborate with interdisciplinary team   - Patient/family teaching  - Consider wound care consult   Outcome: Progressing     Problem: Nutrition/Hydration-ADULT  Goal: Nutrient/Hydration intake appropriate for improving, restoring or maintaining nutritional needs  Description  Monitor and assess patient's nutrition/hydration status for malnutrition  Collaborate with interdisciplinary team and initiate plan and interventions as ordered  Monitor patient's weight and dietary intake as ordered or per policy  Utilize nutrition screening tool and intervene as necessary  Determine patient's food preferences and provide high-protein, high-caloric foods as appropriate       INTERVENTIONS:  - Monitor oral intake, urinary output, labs, and treatment plans  - Assess nutrition and hydration status and recommend course of action  - Evaluate amount of meals eaten  - Assist patient with eating if necessary   - Allow adequate time for meals  - Recommend/ encourage appropriate diets, oral nutritional supplements, and vitamin/mineral supplements  - Order, calculate, and assess calorie counts as needed  - Recommend, monitor, and adjust tube feedings and TPN/PPN based on assessed needs  - Assess need for intravenous fluids  - Provide specific nutrition/hydration education as appropriate  - Include patient/family/caregiver in decisions related to nutrition  Outcome: Progressing

## 2020-02-18 NOTE — BRIEF OP NOTE (RAD/CATH)
IR ABDOMINAL ANGIOGRAPHY / INTERVENTION Procedure Note    PATIENT NAME: Di Boateng  : 1955  MRN: [id number]    Pre-op Diagnosis:   1  Ischemic ulcer of toe of left foot, with unspecified severity ([address] )    2  PAD (peripheral artery disease) ([facility])      Post-op Diagnosis:   1  Ischemic ulcer of toe of left foot, with unspecified severity ([facility] )    2  PAD (peripheral artery disease) [facility])        Surgeon:   Mike Do MD  Assistants:     No qualified resident was available, Resident is only observing    Estimated Blood Loss: 220 ml  Findings:   LLE arteriography showed acute occlusion of distal SFA extending into P2 segment, with tandem acute occlusion of proximal tibioperoneal trunk  Pharmacomechanical thrombectomy of the SFA/pop clot as well as TP clot  Stenting of the distal SFA/proximal pop    POBA of proximal TP trunk  Starclose R groin  2 hours flat  Pt not anticipated to return to IR tomorrow  Specimens: None  Complications:  None      Anesthesia: Conscious sedation    Mike Do MD     Date: 2020  Time: 2:40 PM

## 2020-02-18 NOTE — CONSULTS
Podiatry Consultation - [facility]     Patient Information: Jesse Guerrero 60 y o  male MRN: [id number]  [facility] Encounter: [id number]  PCP: Ashley Hope DO  Date of Admission:  2/17/2020  Date of Consultation: 02/18/20  Requesting Physician: Romaine Tyler MD    Reason For Consultation:   Left foot gangrene    Assessment:     Podiatric assessment:   Left lateral foot eschar without signs of skin infection   PAD      Principal Problem:    Arterial occlusion  Active Problems:    Bipolar 1 disorder ([facility] )    Tobacco user    S/P coronary artery stent placement      Plan:    · Local wound care with betadine paint open to air changed daily  · No surgical planning at this time   · Follow up xray of left foot  · Post agram LEADs will be decided by vascular team  · Will confirm this plan with my attending        History of Present Illness:    Melvina Verma is a 60 y o  male who is originally admitted 2/17/2020 due to arterial occlusion  We are consulted for He has a wound on the lateral aspect of his left foot  He does not know the duration of the wound  He has had an xray of it in the past and this did not show any bone changes  He does admit to pain in th left foot wound  He is aware that his feet are dirty and he states that they are this way due to a back injury not allowing him to wash his feet  He states that he lives alone at home and that I "would be astounded by his life story "     Review of Systems:    Review of Systems   Constitutional: Negative  HENT: Negative  Eyes: Negative  Respiratory: Negative  Cardiovascular: Negative  Gastrointestinal: Negative  Musculoskeletal: foot pain  Skin: non-healing sores  Neurological: Negative  Psych: Negative       Past Medical and Surgical History:     Past Medical History:   Diagnosis Date    B[facility] 1 disorder ([facility] )     CAD (coronary artery disease)     History of ventricular fibrillation 12/27/2019    in setting in-stent thrombosis; received defibrillation with 200J x4; received less than 30s chest compressions before ROSC achieved    Schizo-affective psychosis ([facility] )     Tobacco user        Past Surgical History:   Procedure Laterality Date    BACK SURGERY      CARDIAC SURGERY      stent placement    CHOLECYSTECTOMY      KNEE SURGERY Right        Meds/Allergies:    PTA meds:   Prior to Admission Medications   Prescriptions Last Dose Informant Patient Reported? Taking?   aspirin 81 mg chewable tablet   No No   Sig: Chew 1 tablet (81 mg total) daily   atorvastatin (LIPITOR) 80 mg tablet   No No   Sig: Take 1 tablet (80 mg total) by mouth every evening   clopidogrel (PLAVIX) 75 mg tablet   No No   Sig: Take 1 tablet (75 mg total) by mouth daily   metoprolol succinate (TOPROL-XL) 25 mg 24 hr tablet   No No   Sig: Take 1 tablet (25 mg total) by mouth daily   nitroglycerin (NITROSTAT) 0 4 mg SL tablet   No No   Sig: Place 1 tablet (0 4 mg total) under the tongue every 5 (five) minutes as needed for chest pain   ranolazine (RANEXA) 500 mg 12 hr tablet   No No   Sig: Take 1 tablet (500 mg total) by mouth 2 (two) times a day   topiramate (TOPAMAX) 100 mg tablet  Self No No   Sig: Take 1 tablet (100 mg total) by mouth 2 (two) times a day      Facility-Administered Medications: None       Allergies:    Allergies   Allergen Reactions    Fish-Derived Products Hives    Rice Bean [Phaseolus] Hives       Social History:     Marital Status:     Substance Use History:   Social History     Substance and Sexual Activity   Alcohol Use Not Currently    Frequency: Never     Social History     Tobacco Use   Smoking Status Heavy Tobacco Smoker    Packs/day: 1 50    Types: Cigarettes   Smokeless Tobacco Never Used     Social History     Substance and Sexual Activity   Drug Use Never       Family History:    Family History   Problem Relation Age of Onset    Heart disease Mother     Cancer Mother     Heart disease Father     Cancer Father  Multiple sclerosis Sister     Cancer Sister        Physical Exam:     Vitals:   Blood Pressure: 125/72 (02/18/20 1616)  Pulse: (!) 48 (02/18/20 1629)  Temperature: 97 6 °F (36 4 °C) (02/18/20 1616)  Temp Source: Oral (02/18/20 1629)  Respirations: 16 (02/18/20 1629)  SpO2: 100 % (02/18/20 1629)        Physical Exam  General Appearance:    Alert, cooperative, no distress   Head:    Normocephalic, without obvious abnormality, atraumatic   Eyes:    PERRL, conjunctiva/corneas clear      Nose:   Moist mucous membranes   Neck:   Supple, symmetrical, trachea midline   Back:     Symmetric   Lungs:     Respirations unlabored   Heart:    Regular rate and rhythm   Abdomen:     Soft, non-tender    Foot Exam     Extremities:   MMT is 5/5 to all compartments of the LE, +0/4 edema B/L, No pain on palpation noted bilaterally  No calf tenderness noted bilaterally  Vascular:   Left foot DP weak, PT weak, Right foot DP weak, Right foot PT weak  Capillary refill normal to all digits  Pedal hair is Absent   Skin:   Skin is of Abnormal appearance  Skin is of Abnormal  turgor  Skin is of Normal temperature  Wound #: 1  Location: left lateral 5th metatarsal head  Length 2cm: Width 1 5cm: Depth 0 1cm:   Deepest Tissue Noted in Base: eschar  Probe to Bone: No  Peripheral Skin Description: non-attached  Granulation: 0% Fibrotic Tissue: 0% Necrotic Tissue: 100%   Drainage Amount: none  Signs of Infection: No  Total debrided 0 square centimeters       Neurologic:   Gross sensation is Absent  Protective sensation is Absent  Images:    Left lateral foot pre-drebridement    Left lateral foot post debridement      Additional Data:     Lab Results: I have personally reviewed pertinent reports        Results from last 7 days   Lab Units 02/18/20  0618  02/17/20  1527   WBC Thousand/uL 7 66   < > 14 10*   HEMOGLOBIN g/dL 16 2   < > 18 1*   HEMATOCRIT % 49 0   < > 53 1*   PLATELETS Thousands/uL 197   < > 238   NEUTROS PCT %  --   --  66 LYMPHS PCT %  --   --  27   MONOS PCT %  --   --  6   EOS PCT %  --   --  1    < > = values in this interval not displayed  Results from last 7 days   Lab Units 02/18/20  0618 02/17/20  1527   POTASSIUM mmol/L 3 4* 3 5   CHLORIDE mmol/L 110* 104   CO2 mmol/L 27 29   BUN mg/dL 9 12   CREATININE mg/dL 0 70 0 96   CALCIUM mg/dL 8 9 9 4   ALK PHOS U/L  --  70   ALT U/L  --  25   AST U/L  --  9     Results from last 7 days   Lab Units 02/17/20  2233   INR  1 13       Imaging: I have personally reviewed pertinent films in PACS    Xr Ankle 3+ Vw Left    Result Date: 2/4/2020  Narrative: LEFT ANKLE INDICATION:   L03 116: Cellulitis of left lower limb  COMPARISON:  None VIEWS:  XR ANKLE 3+ VW LEFT Images: 3 FINDINGS: There is no acute fracture or dislocation  The ankle mortise is symmetric and well aligned  Calcaneal spur(s) noted  No lytic or blastic lesions seen  Soft tissues are unremarkable  Impression: No acute osseous abnormality  Degenerative changes as described  Workstation performed: [id number]     Xr Foot 3+ Vw Left    Result Date: 2/4/2020  Narrative: LEFT FOOT INDICATION:   L03 116: Cellulitis of left lower limb  COMPARISON:  None VIEWS:  XR FOOT 3+ VW LEFT Images: 3 FINDINGS: There is no acute fracture or dislocation  Calcaneal spur(s) noted  Narrowing of the 1st MTP joint also noted  No lytic or blastic lesions seen  Soft tissues are unremarkable  Impression: No acute osseous abnormality  Degenerative changes as described  Workstation performed: [id number]     Cta Lower Extremity Left W Wo Contrast    Result Date: 2/17/2020  Narrative: CT ANGIOGRAM OF LOWER EXTREMITIES WITH IV CONTRAST INDICATION: Acute limb ischemia  Foot pain for 3 weeks  COMPARISON: None   TECHNIQUE:  CT angiogram examination of the abdomen, pelvis, and lower extremities was performed according to standard protocol with intravenous contrast   This examination, like all CT scans performed in the [facility], was performed utilizing techniques to minimize radiation dose exposure, including the use of iterative reconstruction and automated exposure control  3D reconstructions were performed an independent workstation, and are supplied for review  Rad dose 1997 mGy-cm IV Contrast:  120 mL of iohexol (OMNIPAQUE)  FINDINGS: VASCULAR STRUCTURES: External iliac arteries: Widely patent bilaterally without aneurysm or dissection  RIGHT LOWER EXTREMITY: CFA: Widely patent Profunda: Patent SFA: Approximately 75% focal stenosis at the junction of the SFA-popliteal artery/adductor canal   Calcified and noncalcified plaques  Popliteal artery: Patent Runoff vessels: Two-vessel runoff with the anterior tibial artery and peroneal artery  The posterior tibial artery is occluded at the level of the midcalf  The peroneal artery was seen down to the level of the ankle and reconstitutes the distal posterior tibial artery  Dorsalis pedis and plantar arteries are visualized  Plantar pedal loop is intact  LEFT LOWER EXTREMITY: CFA: Patent  Scattered plaques  Profunda: Patent SFA: Patent  Scattered calcified and noncalcified plaques  Popliteal artery: Complete occlusion of P1 and P2 segments with reconstitution of P3 segment via geniculate collaterals  Runoff vessels: Tibial peroneal trunk is occluded  Posterior tibial artery and peroneal artery are reconstituted at the origins  The anterior tibial artery and peroneal artery are patent down to the level of the distal calf  Dorsalis pedis artery is reconstituted at the level of the ankle  Posterior tibial artery is patent and is the dominant supply down the foot  Plantar pedal loop is intact  OTHER FINDINGS PELVIS REPRODUCTIVE ORGANS:  Unremarkable for patient's age  URINARY BLADDER:  Unremarkable  ADDITIONAL ABDOMINAL AND PELVIC STRUCTURES VISUALIZED BOWEL: Within normal limits ABDOMINAL WALL/INGUINAL REGIONS:  Unremarkable   OSSEOUS STRUCTURES:  No acute fracture or destructive osseous lesion  Impression: Subacute or chronic occlusion of left P1 and P2 segments with reconstitution at P3 segment via collaterals  Bilateral tibial peroneal disease as described above  Moderate focal stenosis at the junction of the right SFA-popliteal artery/adductor canal  Workstation performed: [id number]     Vas Lower Limb Arterial Duplex, Limited, Unilateral    Result Date: 2/18/2020  Narrative:  [facility] REPORT CLINICAL: Indications: Patient presents with diminished pedal pulses and pain of the left foot x3 weeks  Operative History: 2019-08-13 Cardiac cath Risk Factors The patient has history of CAD and smoking (current) 1-2 ppd  The patient current BMI is 29 7, Weight is 207 lb and height is 70 in  Clinical Right Pressure: IV site, Left Pressure:  117/ mm Hg  FINDINGS:  Left                   Impression      PSV  AP (cm)  Common Femoral Artery                  113           Prox Profunda                          118           Prox SFA                                75           Mid SFA                                 46           Dist SFA               Occluded         59           Proximal Pop           Occluded                      Distal Pop             Occluded                 1 0  Tibioperoneal          Not visualized                Prox Post Tibial                        11           Dist Post Tibial                        12           Dist  Ant  Tibial                       18           Prox Peroneal          Not visualized                   CONCLUSION:  Impression:  RIGHT LOWER LIMB: Ankle/Brachial Index: 1 30  which is normal range  Prior 0 94 (11/7/2019) PVR/ PPG tracings are dampened  Metatarsal pressure of 114 mmHg Great toe pressure of 99 mmHg,  within the healing range  LEFT LOWER LIMB: Evaluation shows acute occlusion of distal superficial femoral artery and popliteal artery  Popliteal artery is ectatic measuring 1 0 cm   Ankle/Brachial Index: 0 45, which is ischemic range  Prior 1 0 (11/7/2019) PVR/ PPG tracings are severely dampened  Metatarsal pressure of 45 mmHg Great toe pressure of 0 mmHg, flat lined  Technical findings given to Dr Airam Martin  SIGNATURE: Electronically Signed by: Judy Meadows on 2020-02-18 08:56:35 AM          ** Please Note: This note has been constructed using a voice recognition system   **

## 2020-02-18 NOTE — H&P
H&P- rBo Job Yolanda 1955, 60 y o  male MRN: [id number]    Unit/Bed#: [facility] 806-01 Encounter: [id number]    Primary Care Provider: Margarito Galvez DO   Date and time admitted to hospital: 2/17/2020  8:27 PM        * Arterial occlusion  Assessment & Plan  Three week history of pain in left foot and chronic nonhealing ulcer  Was being treated for presumed cellulitis previously, with no relief  CTA lower extremity (2/17): Subacute or chronic occlusion of left P1 and P2 segments with reconstitution at P3 segment via collaterals  Bilateral tibial peroneal disease as described above  Moderate focal stenosis at the junction of the right SFA-popliteal artery/adductor canal   Vascular surgery recs:    NPO @ midnight   Continue heparin gtt  IR consult for possible lysis tomorrow AM   Continue serial neurovascular checks    S/P coronary artery stent placement  Assessment & Plan  Stent placed in 12/2019  Stable, continue ASA and plavix    Tobacco user  Assessment & Plan  Current every day smoker, 1 5 PPD  Refused nicotine patch    Bipolar 1 disorder (HCC)  Assessment & Plan  Stable  Continue PTA Topiramate        Diet: NPO @ midnight  DVT PPx: Heparin gtt  Code Status: DNR/DNI  Disposition: This patient requires inpatient level care  History of Present Illness      HPI:  Sivakumar Marroquin is a 60 y o  male transferred from [address] [place] [address] who presents with pain in his left foot for the past 3 weeks  He was previously being treated for cellulitis, with no relief or resolution in his symptoms  He states that his pain is significant, constant, and has worsened  He presented to the ED and bedside doppler could not  any signals in L lower extremity or foot  He was subsequently placed on a heparin drip and transferred to [facility]  ED Management: Heparin drip, dilaudid, morphine    Review of Systems   Constitutional: Negative for activity change, chills, diaphoresis, fatigue and fever     HENT: Negative for sore throat and trouble swallowing  Eyes: Negative for visual disturbance  Respiratory: Negative for cough, choking and shortness of breath  Cardiovascular: Negative for chest pain, palpitations and leg swelling  Gastrointestinal: Negative for diarrhea, nausea and vomiting  Genitourinary: Negative for decreased urine volume and dysuria  Musculoskeletal: Negative for back pain and neck pain  Skin: Positive for wound (Ulcer on L lateral foot)  Negative for color change  Neurological: Negative for weakness, light-headedness and headaches  All other systems reviewed and are negative  Historical Information   Past Medical History:   Diagnosis Date    [facility] disorder ([facility] )     CAD (coronary artery disease)     History of ventricular fibrillation 12/27/2019    in setting in-stent thrombosis; received defibrillation with 200J x4; received less than 30s chest compressions before ROSC achieved    Schizo-affective psychosis ([facility] )     Tobacco user      Past Surgical History:   Procedure Laterality Date    BACK SURGERY      CARDIAC SURGERY      stent placement    CHOLECYSTECTOMY      KNEE SURGERY Right      Social History   Social History     Substance and Sexual Activity   Alcohol Use Not Currently    Frequency: Never     Social History     Substance and Sexual Activity   Drug Use Never     Social History     Tobacco Use   Smoking Status Heavy Tobacco Smoker    Packs/day: 1 50    Types: Cigarettes   Smokeless Tobacco Never Used     Family History: non-contributory    Meds/Allergies   all medications and allergies reviewed  Allergies   Allergen Reactions    Fish-Derived Products Hives    Rice Bean [Phaseolus] Hives       Objective   Vitals: There were no vitals taken for this visit      No intake or output data in the 24 hours ending 02/18/20 0002    Invasive Devices     Peripheral Intravenous Line            Peripheral IV 02/17/20 Right Antecubital less than 1 day                Physical Exam Constitutional: He is oriented to person, place, and time  He appears well-developed and well-nourished  No distress  Feet and hands covered in dirt    HENT:   Head: Normocephalic and atraumatic  Mouth/Throat: Oropharynx is clear and moist    Poor dentition   Eyes: Conjunctivae and EOM are normal    Neck: Normal range of motion  Neck supple  Cardiovascular: Normal rate, regular rhythm and normal heart sounds  Pulmonary/Chest: Effort normal and breath sounds normal  No respiratory distress  Abdominal: Soft  Bowel sounds are normal    Musculoskeletal: Normal range of motion  He exhibits tenderness (L foot)  He exhibits no edema  Neurological: He is alert and oriented to person, place, and time  Skin: He is not diaphoretic  Chronic ulcer on lateral aspect of L foot       Lab Results:   I have personally reviewed pertinent reports        Recent Results (from the past 24 hour(s))   ECG 12 lead    Collection Time: 02/17/20  3:16 PM   Result Value Ref Range    Ventricular Rate 63 BPM    Atrial Rate 63 BPM    VA Interval 136 ms    QRSD Interval 76 ms    QT Interval 416 ms    QTC Interval 425 ms    P Axis 52 degrees    QRS Axis -29 degrees    T Wave Axis 9 degrees   CBC and differential    Collection Time: 02/17/20  3:27 PM   Result Value Ref Range    WBC 14 10 (H) 4 31 - 10 16 Thousand/uL    RBC 5 36 3 88 - 5 62 Million/uL    Hemoglobin 18 1 (H) 12 0 - 17 0 g/dL    Hematocrit 53 1 (H) 36 5 - 49 3 %    MCV 99 (H) 82 - 98 fL    MCH 33 8 26 8 - 34 3 pg    MCHC 34 1 31 4 - 37 4 g/dL    RDW 13 9 11 6 - 15 1 %    MPV 10 7 8 9 - 12 7 fL    Platelets 602 433 - 567 Thousands/uL    nRBC 0 /100 WBCs    Neutrophils Relative 66 43 - 75 %    Immat GRANS % 0 0 - 2 %    Lymphocytes Relative 27 14 - 44 %    Monocytes Relative 6 4 - 12 %    Eosinophils Relative 1 0 - 6 %    Basophils Relative 0 0 - 1 %    Neutrophils Absolute 9 32 (H) 1 85 - 7 62 Thousands/µL    Immature Grans Absolute 0 04 0 00 - 0 20 Thousand/uL Lymphocytes Absolute 3 77 0 60 - 4 47 Thousands/µL    Monocytes Absolute 0 78 0 17 - 1 22 Thousand/µL    Eosinophils Absolute 0 14 0 00 - 0 61 Thousand/µL    Basophils Absolute 0 05 0 00 - 0 10 Thousands/µL   Protime-INR    Collection Time: 02/17/20  3:27 PM   Result Value Ref Range    Protime 10 7 9 8 - 12 0 seconds    INR 1 00 0 91 - 1 09   APTT    Collection Time: 02/17/20  3:27 PM   Result Value Ref Range    PTT 26 25 - 32 seconds   Comprehensive metabolic panel    Collection Time: 02/17/20  3:27 PM   Result Value Ref Range    Sodium 139 136 - 145 mmol/L    Potassium 3 5 3 5 - 5 3 mmol/L    Chloride 104 100 - 108 mmol/L    CO2 29 21 - 32 mmol/L    ANION GAP 6 4 - 13 mmol/L    BUN 12 5 - 25 mg/dL    Creatinine 0 96 0 60 - 1 30 mg/dL    Glucose 99 65 - 140 mg/dL    Calcium 9 4 8 3 - 10 1 mg/dL    AST 9 5 - 45 U/L    ALT 25 12 - 78 U/L    Alkaline Phosphatase 70 46 - 116 U/L    Total Protein 7 4 6 4 - 8 2 g/dL    Albumin 3 4 (L) 3 5 - 5 0 g/dL    Total Bilirubin 0 30 0 20 - 1 00 mg/dL    eGFR 84 ml/min/1 73sq m   APTT six (6) hours after Heparin bolus/drip initiation or dosing change    Collection Time: 02/17/20 10:33 PM   Result Value Ref Range    PTT 47 (H) 23 - 37 seconds   CBC    Collection Time: 02/17/20 10:33 PM   Result Value Ref Range    WBC 10 46 (H) 4 31 - 10 16 Thousand/uL    RBC 4 98 3 88 - 5 62 Million/uL    Hemoglobin 16 6 12 0 - 17 0 g/dL    Hematocrit 50 0 (H) 36 5 - 49 3 %     (H) 82 - 98 fL    MCH 33 3 26 8 - 34 3 pg    MCHC 33 2 31 4 - 37 4 g/dL    RDW 14 1 11 6 - 15 1 %    Platelets 605 257 - 824 Thousands/uL    MPV 11 1 8 9 - 12 7 fL   Protime-INR    Collection Time: 02/17/20 10:33 PM   Result Value Ref Range    Protime 14 1 11 6 - 14 5 seconds    INR 1 13 0 84 - 1 19   Fibrinogen    Collection Time: 02/17/20 10:33 PM   Result Value Ref Range    Fibrinogen 411 227 - 495 mg/dL       Imaging:   CTA lower extremity: Subacute or chronic occlusion of left P1 and P2 segments with reconstitution at P3 segment via collaterals        Bilateral tibial peroneal disease as described above        Moderate focal stenosis at the junction of the right SFA-popliteal artery/adductor canal       Counseling / Coordination of Care  Total floor / unit time spent today 30 minutes  Greater than 50% of total time was spent with the patient and / or family counseling and / or coordination of care  MD Prema McdermottMarietta [facility] Clinic[facility], PGY-1  2/18/2020 12:02 AM    Please be aware that this note contains text that was dictated and there may be errors pertaining to "sound-alike "words during the dictation process

## 2020-02-18 NOTE — UTILIZATION REVIEW
Notification of Inpatient Admission/Inpatient Authorization Request   This is a Notification of Inpatient Admission for [address]  Be advised that this patient was admitted to our facility under Inpatient Status  Contact Jerica Whyte at [phone] for additional admission information  [address]  DEPT  DEDICATED -[phone]  Patient Name:   Yaima Guerrero   YOB: 1955       [address]   [address]:   [address]  Tax ID: [id number]  NPI: [id number] Attending Provider/NPI:    Place of Service Code: 24     [facility]   Start Date: 2/17/20 1704     Discharge Date & Time: 2/17/2020  7:46 PM    Type of Admission: Inpatient Status Discharge Disposition (if discharged): [address] [place] [address]   Patient Diagnoses: Foot pain [M79 673]     Orders: Admission Orders (From admission, onward)     Ordered        02/17/20 1705  Inpatient Admission  Once                    Assigned Utilization Review Contact: Shahnaz Whyte  Utilization   Network Utilization Review Department  Phone: [phone]; Fax [phone]  Email: [email or web address]   ATTENTION PAYERS: Please call the assigned Utilization  directly with any questions or concerns ALL voicemails in the department are confidential  Send all requests for admission clinical reviews, approved or denied determinations and any other requests to dedicated fax number belonging to the campus where the patient is receiving treatment

## 2020-02-18 NOTE — UTILIZATION REVIEW
Initial Clinical Review  PATIENT WAS ADMITTED FROM ED HOWEVER DID NOT ADMIT TRANSFERRED TO [facility] [place]    Admission: Date/Time/Statement: Admission Orders (From admission, onward)     Ordered        02/17/20 1705  Inpatient Admission  Once                   Orders Placed This Encounter   Procedures    Inpatient Admission     Standing Status:   Standing     Number of Occurrences:   1     Order Specific Question:   Admitting Physician     Answer:   Tomer Ewing     Order Specific Question:   Level of Care     Answer:   Med Surg [16]     Order Specific Question:   Estimated length of stay     Answer:   More than 2 Midnights     Order Specific Question:   Certification     Answer:   I certify that inpatient services are medically necessary for this patient for a duration of greater than two midnights  See H&P and MD Progress Notes for additional information about the patient's course of treatment  ED Arrival Information     Expected Arrival Acuity Means of Arrival Escorted By Service Admission Type    - 2/17/2020 14:06 Urgent Walk-In Self General Medicine Urgent    Arrival Complaint    Foot Pain-Sent by [facility]        Chief Complaint   Patient presents with    Foot Pain     L foot pain for about 3 weeks  states sent from [place] for eval for blood clots     Assessment/Plan: Patient has a significant history of coronary artery disease, continues to smoke cigarettes  Patient has been having pain in the left lower extremity and foot for about 3 weeks now  He has been treated with antibiotics for presumed cellulitis and a poorly healing ulcer without improvement  Patient was sent in from the office today for evaluation in the ED, possible blood clot  Patient is on aspirin and Plavix  Has no history of DVT  Patient states the pain has been getting worse and he has been chewing up a lot of Advil  Patient has mild edema of the left lower extremity with a small amount of erythema    There is a temperature change half way down the lower leg to the foot  Patient has decreased sensation in the foot   There is a poorly healing ulcer on the lateral aspect of the 5th MTP joint area    Vascular consult  Assessment:  Patient is a 79-year-old male with 3 weeks of left lower extremity pain  CTA lower extremities with runoff consistent with SFA occlusion, and reconstitution in the popliteal trunk      2/17 LEADS: LLE:  Acute occlusion of distal superficial femoral artery and popliteal artery  Popliteal artery is ectatic measuring 1 0 cm  Ankle/Brachial Index: 0 45, which is ischemic range  Prior 1 0 (11/7/2019) PVR/ PPG tracings are severely dampened  Metatarsal pressure of 45 mmHg  Great toe pressure of 0 mmHg, flat lined  Exam: B/l LE sensory and motor intact with good cap refill  B/l  palpable femoral pulse  B/l dopplerable pop signals  LLE w dopplerable peroneal art signal and PT signal   RLE dopp DP/PT  Plan:  NPO @ midnight  Continue heparin gtt     IR consult for possible lysis tomorrow AM  Continue serial neurovascular checks     TRANSFER TO [facility]  Patient Condition  The patient has been stabilized such that within reasonable medical probability, no material deterioration of the patient condition or the condition of the unborn child(thony) is likely to result from the transfer   Reason for Transfer  Level of Care needed not available at this facility   Benefits of Transfer  Specialized equipment and/or services available at the receiving facility (Include comment)________________________   Risks of Transfer  Potential for delay in receiving treatment   Accepting Physician  Dr Radha Lyons Name, Aaron Swanson          ED Triage Vitals   Temperature Pulse Respirations Blood Pressure SpO2   02/17/20 1437 02/17/20 1437 02/17/20 1435 02/17/20 1437 02/17/20 1435   98 9 °F (37 2 °C) 92 (!) 24 148/79 97 %      Temp Source Heart Rate Source Patient Position - Orthostatic VS BP Location FiO2 (%)   02/17/20 1437 02/17/20 1437 02/17/20 1435 02/17/20 1435 --   Tympanic Monitor Sitting Left arm       Pain Score       02/17/20 1435       Worst Possible Pain        Wt Readings from Last 1 Encounters:   02/17/20 93 9 kg (207 lb 0 2 oz)     Additional Vital Signs:   Pertinent Labs/Diagnostic Test Results:   Results from last 7 days   Lab Units 02/18/20 0618 02/17/20 2233 02/17/20  1527   WBC Thousand/uL 7 66 10 46* 14 10*   HEMOGLOBIN g/dL 16 2 16 6 18 1*   HEMATOCRIT % 49 0 50 0* 53 1*   PLATELETS Thousands/uL 197 214 238   NEUTROS ABS Thousands/µL  --   --  9 32*     Results from last 7 days   Lab Units 02/18/20  0618 02/17/20  1527   SODIUM mmol/L 142 139   POTASSIUM mmol/L 3 4* 3 5   CHLORIDE mmol/L 110* 104   CO2 mmol/L 27 29   ANION GAP mmol/L 5 6   BUN mg/dL 9 12   CREATININE mg/dL 0 70 0 96   EGFR ml/min/1 73sq m 101 84   CALCIUM mg/dL 8 9 9 4     Results from last 7 days   Lab Units 02/17/20  1527   AST U/L 9   ALT U/L 25   ALK PHOS U/L 70   TOTAL PROTEIN g/dL 7 4   ALBUMIN g/dL 3 4*   TOTAL BILIRUBIN mg/dL 0 30     Results from last 7 days   Lab Units 02/18/20  0618 02/17/20  1527   GLUCOSE RANDOM mg/dL 95 99     Results from last 7 days   Lab Units 02/18/20 0618 02/17/20 2233 02/17/20  1527   PROTIME seconds  --  14 1 10 7   INR   --  1 13 1 00   PTT seconds 51* 47* 26     ED Treatment:   Medication Administration from 02/17/2020 1406 to 02/18/2020 0841       Date/Time Order Dose Route Action Action by Comments     02/17/2020 1528 sodium chloride 0 9 % bolus 1,000 mL 1,000 mL Intravenous [address] Francie Roblero RN      02/17/2020 1525 morphine (PF) 4 mg/mL injection 4 mg 4 mg Intravenous Given Francie Roblero RN      02/17/2020 1658 HYDROmorphone (DILAUDID) injection 1 mg 1 mg Intravenous Given Onalee Moores, RN      02/17/2020 1714 heparin (porcine) 25,000 units in 250 mL infusion (premix) 30 Units/kg/hr Intravenous [address] Francie Roblero RN      02/17/2020 6878 iohexol (OMNIPAQUE) 350 MG/ML injection (MULTI-DOSE) 120 mL 120 mL Intravenous Given Asael Thomasmarti         Past Medical History:   Diagnosis Date    Bipolar 1 disorder ([facility] )     CAD (coronary artery disease)     History of ventricular fibrillation 12/27/2019    in setting in-stent thrombosis; received defibrillation with 200J x4; received less than 30s chest compressions before ROSC achieved    Schizo-affective psychosis ([facility] )     Tobacco user      Present on Admission:   Coronary artery disease involving native coronary artery of native heart with unstable angina pectoris ([facility] )   Bipolar 1 disorder ([facility] )   Tobacco user   COPD (chronic obstructive pulmonary disease) ([facility] )      Admitting Diagnosis: Foot pain [M79 673]  Age/Sex: 60 y o  male  Admission Orders:  TRANSFER TO [facility]  Scheduled Medications:    No current facility-administered medications for this encounter  Continuous IV Infusions:    No current facility-administered medications for this encounter  PRN Meds:    No current facility-administered medications for this encounter  None    Network Utilization Review Department  [email or web address]  ATTENTION: Please call with any questions or concerns to [phone] and carefully listen to the prompts so that you are directed to the right person  All voicemails are confidential   Alfredo Arce all requests for admission clinical reviews, approved or denied determinations and any other requests to dedicated fax number below belonging to the campus where the patient is receiving treatment   List of dedicated fax numbers for the Facilities:  FACILITY NAME UR FAX NUMBER   ADMISSION DENIALS (Administrative/Medical Necessity) [phone]   [address]  (Maternity/NICU/Pediatrics) [phone]   Denton Keller [phone]   Eliseo Martell [address] 130[address] [facility] [address] [phone]   [facility]  [phone] 2978 [facility]  [phone] [address] [phone]

## 2020-02-18 NOTE — PROGRESS NOTES
Interventional Radiology Preprocedure Note    History/Indication for procedure:   Sudha Espinal is a 60 y o  male with a PMH of CLTI manifested by a L lateral foot ulcer who presents with 3 week history of LLE rest pain  He presents for LLE arteriography with intervention      Relevant past medical history:    Past Medical History:   Diagnosis Date    Bipolar 1 disorder ([facility] )     CAD (coronary artery disease)     History of ventricular fibrillation 12/27/2019    in setting in-stent thrombosis; received defibrillation with 200J x4; received less than 30s chest compressions before ROSC achieved    Schizo-affective psychosis ([facility] )     Tobacco user      Patient Active Problem List   Diagnosis    Coronary artery disease involving native coronary artery of native heart with unstable angina pectoris ([facility] )    Bipolar 1 disorder ([facility] )    Tobacco user    Type 2 myocardial infarction without ST elevation ([facility] )    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[place] [address] )    Acute ST elevation myocardial infarction (STEMI) involving left anterior descending (LAD) coronary artery ([facility] )    COPD (chronic obstructive pulmonary disease) ([place] [address] )    S/P coronary artery stent placement    Ischemic ulcer of toe of left foot, with unspecified severity (HCC)    Arterial occlusion       /64   Pulse (!) 50   Temp (!) 97 4 °F (36 3 °C)   Resp 18   SpO2 97%     Medications:    Inpatient Medications:     Scheduled Medications:    Current Facility-Administered Medications:  acetaminophen 650 mg Oral Q6H PRN Chela Nares, DO    aspirin 81 mg Oral Daily Christiano Canas MD    atorvastatin 80 mg Oral QPM Christiano Canas MD    clopidogrel 75 mg Oral Daily Christiano Canas MD    heparin (porcine) 3-30 Units/kg/hr (Order-Specific) Intravenous Titrated Christiano Canas MD Last Rate: 22 Units/kg/hr (02/18/20 0740)   HYDROmorphone 0 5 mg Intravenous Q3H PRN Chela Houston, DO    metoprolol succinate 25 mg Oral Daily Christiano Canas MD    oxyCODONE 10 mg Oral Q4H PRN Christiana Tisha, DO    oxyCODONE 5 mg Oral Q4H PRN Christiana Tisha, DO    ranolazine 500 mg Oral BID Juan Casiano MD    topiramate 100 mg Oral BID Juan Casiano MD        Infusions:    heparin (porcine) 3-30 Units/kg/hr (Order-Specific) Last Rate: 22 Units/kg/hr (02/18/20 0740)       PRN:    acetaminophen    HYDROmorphone    oxyCODONE    oxyCODONE    Outpatient Medications:  Current Facility-Administered Medications on File Prior to Encounter   Medication Dose Route Frequency Provider Last Rate Last Dose    [COMPLETED] HYDROmorphone (DILAUDID) injection 1 mg  1 mg Intravenous Once Jacquelin Snellen, MD   1 mg at 02/17/20 1658    [COMPLETED] iohexol (OMNIPAQUE) 350 MG/ML injection (MULTI-DOSE) 120 mL  120 mL Intravenous Once in imaging Jacquelin Snellen, MD   120 mL at 02/17/20 1808    [COMPLETED] morphine (PF) 4 mg/mL injection 4 mg  4 mg Intravenous Once Jacquelin Snellen, MD   4 mg at 02/17/20 1525    [COMPLETED] sodium chloride 0 9 % bolus 1,000 mL  1,000 mL Intravenous Once Jacquelin Snellen, MD 1,000 mL/hr at 02/17/20 1528 1,000 mL at 02/17/20 1528    [DISCONTINUED] heparin (porcine) 25,000 units in 250 mL infusion (premix)  30 Units/kg/hr (Order-Specific) Intravenous Continuous Jacquelin Snellen, MD 28 2 mL/hr at 02/17/20 1719 30 Units/kg/hr at 02/17/20 1719     Current Outpatient Medications on File Prior to Encounter   Medication Sig Dispense Refill    aspirin 81 mg chewable tablet Chew 1 tablet (81 mg total) daily 90 tablet 3    atorvastatin (LIPITOR) 80 mg tablet Take 1 tablet (80 mg total) by mouth every evening 90 tablet 3    clopidogrel (PLAVIX) 75 mg tablet Take 1 tablet (75 mg total) by mouth daily 90 tablet 3    metoprolol succinate (TOPROL-XL) 25 mg 24 hr tablet Take 1 tablet (25 mg total) by mouth daily 90 tablet 1    nitroglycerin (NITROSTAT) 0 4 mg SL tablet Place 1 tablet (0 4 mg total) under the tongue every 5 (five) minutes as needed for chest pain 30 tablet 3    ranolazine (RANEXA) 500 mg 12 hr tablet Take 1 tablet (500 mg total) by mouth 2 (two) times a day 180 tablet 3    topiramate (TOPAMAX) 100 mg tablet Take 1 tablet (100 mg total) by mouth 2 (two) times a day 60 tablet 1       Allergies   Allergen Reactions    Fish-Derived Products Hives    Rice Bean [Phaseolus] Hives       Anticoagulants: none    ASA classification: ASA 3 - Patient with moderate systemic disease with functional limitations    Airway Assessment: II (hard and soft palate, upper portion of tonsils anduvula visible)    Relevant family history: None    Relevant review of systems: None    Prior sedation/anesthesia: yes    Can the patient lie flat? Yes     Does patient have sleep apnea? No    If yes, does patient use CPAP? not applicable    NPO Status: yes    Labs:   CBC with diff: Lab Results   Component Value Date    WBC 7 66 02/18/2020    HGB 16 2 02/18/2020    HCT 49 0 02/18/2020    MCV 99 (H) 02/18/2020     02/18/2020    MCH 32 6 02/18/2020    MCHC 33 1 02/18/2020    RDW 14 2 02/18/2020    MPV 11 8 02/18/2020    NRBC 0 02/17/2020     BMP/CMP:  Lab Results   Component Value Date    K 3 4 (L) 02/18/2020     (H) 02/18/2020    CO2 27 02/18/2020    CO2 27 12/27/2019    BUN 9 02/18/2020    CREATININE 0 70 02/18/2020    GLUCOSE 145 (H) 12/27/2019    CALCIUM 8 9 02/18/2020    AST 9 02/17/2020    ALT 25 02/17/2020    ALKPHOS 70 02/17/2020    EGFR 101 02/18/2020    EGFR 96 12/27/2019   ,     Coags:   Lab Results   Component Value Date    PTT 51 (H) 02/18/2020    INR 1 13 02/17/2020   ,   Results from last 7 days   Lab Units 02/18/20  0618 02/17/20  2233 02/17/20  1527   PTT seconds 51* 47* 26   INR   --  1 13 1 00        Relevant imaging studies:   Reviewed  Directed physical examination:  I agree with the physical exam performed on 2/18/20 and there are no additional changes  Assessment/Plan: For LLE arteriography and intervention  Sedation/Anesthesia plan:   Moderate sedation will be used as needed for procedure      Consent with alternatives to the procedure, risks and benefits have been explained and discussed with the patient/patient's family: yes

## 2020-02-18 NOTE — UTILIZATION REVIEW
Initial Clinical Review    Admission: Date/Time/Statement: Admission Orders (From admission, onward)     Ordered        02/17/20 2058  Inpatient Admission  Once                   Orders Placed This Encounter   Procedures    Inpatient Admission     Standing Status:   Standing     Number of Occurrences:   1     Order Specific Question:   Admitting Physician     Answer:   GABRIELLE HANNA [[id number]]     Order Specific Question:   Level of Care     Answer:   Med Surg [16]     Order Specific Question:   Estimated length of stay     Answer:   More than 2 Midnights     Order Specific Question:   Certification     Answer:   I certify that inpatient services are medically necessary for this patient for a duration of greater than two midnights  See H&P and MD Progress Notes for additional information about the patient's course of treatment  Assessment/Plan: 60 yr old male to the [place] directly as a transfer from [address] [place] [address] where he presented to the ed with pain in his left foot for the past 3 weeks  He was previously being treated for cellulitis with no relief or change and is significantly worse and constant  The doppler at [facility] could not  and signal   He was placed and a heparin drip and transferred to [place]  The plan is cta of lower extremity which showed subacute or chronic occlusion of left p1 and p2 segments with reconstitution at p2  Npo after mn continue heparin drip, ir consult for possible lysis in am , continue serial neurovascular checks      Consult podiatry for pa ad left foot gangrene   He is admitted as an inpatient with arterial occlusion                                                                                         ED Triage Vitals   Temperature Pulse Respirations Blood Pressure SpO2   02/18/20 0005 02/18/20 0005 02/18/20 0005 02/18/20 0005 02/18/20 0005   98 2 °F (36 8 °C) 87 18 126/67 94 %      Temp Source Heart Rate Source Patient Position - Orthostatic VS BP Location FiO2 (%)   02/18/20 0005 -- -- -- --   Oral          Pain Score       02/17/20 2308       6        Wt Readings from Last 1 Encounters:   02/17/20 93 9 kg (207 lb 0 2 oz)     Additional Vital Signs:   /18/20 1142  --  46Abnormal   15  116/74  91  97 %  --   02/18/20 1137  --  44Abnormal   14  115/70  89  98 %  --   02/18/20 1132  --  48Abnormal   13  118/70  89  100 %  --   02/18/20 1127  --  48Abnormal   15  119/71  91  99 %  --   02/18/20 1122  --  45Abnormal   15  111/66  83  99 %  --   02/18/20 1117  --  46Abnormal   14  115/70  87  99 %  --   02/18/20 1112  --  51Abnormal   14  117/70  90  99 %  --   02/18/20 1107  --  46Abnormal   14  113/65  85  99 %  --   02/18/20 1102  --  48Abnormal   14  107/65  81  99 %  --   02/18/20 1057  --  54Abnormal   15  116/79  92  98 %  --   02/18/20 10:54:59  --  --  --  --  --  97 %  Nasal cannula   02/18/20 1050  --  45Abnormal   16  116/63  --  99 %  --       Pertinent Labs/Diagnostic Test Results:   Results from last 7 days   Lab Units 02/18/20  0618 02/17/20  2233 02/17/20  1527   WBC Thousand/uL 7 66 10 46* 14 10*   HEMOGLOBIN g/dL 16 2 16 6 18 1*   HEMATOCRIT % 49 0 50 0* 53 1*   PLATELETS Thousands/uL 197 214 238   NEUTROS ABS Thousands/µL  --   --  9 32*         Results from last 7 days   Lab Units 02/18/20 0618 02/17/20  1527   SODIUM mmol/L 142 139   POTASSIUM mmol/L 3 4* 3 5   CHLORIDE mmol/L 110* 104   CO2 mmol/L 27 29   ANION GAP mmol/L 5 6   BUN mg/dL 9 12   CREATININE mg/dL 0 70 0 96   EGFR ml/min/1 73sq m 101 84   CALCIUM mg/dL 8 9 9 4     Results from last 7 days   Lab Units 02/17/20  1527   AST U/L 9   ALT U/L 25   ALK PHOS U/L 70   TOTAL PROTEIN g/dL 7 4   ALBUMIN g/dL 3 4*   TOTAL BILIRUBIN mg/dL 0 30         Results from last 7 days   Lab Units 02/18/20  0618 02/17/20  1527   GLUCOSE RANDOM mg/dL 95 99     Results from last 7 days   Lab Units 02/18/20  0618 02/17/20  2233 02/17/20  1527   PROTIME seconds  --  14 1 10 7   INR   --  1 13 1 00   PTT seconds 46* 52* 26   cta of lower extremity-Subacute or chronic occlusion of left P1 and P2 segments with reconstitution at P3 segment via collaterals        Bilateral tibial peroneal disease as described above        Moderate focal stenosis at the junction of the right SFA-popliteal artery/adductor canal -  Vas lower limb--leftLEFT LOWER LIMB:  Evaluation shows acute occlusion of distal superficial femoral artery and  popliteal artery  Popliteal artery is ectatic measuring 1 0 cm  Ankle/Brachial Index: 0 45, which is ischemic range  Prior 1 0 (11/7/2019)  PVR/ PPG tracings are severely dampened  Metatarsal pressure of 45 mmHg  Great toe pressure of 0 mmHg, flat lined     ekg-Normal sinus rhythm  Possible Left atrial enlargement  Low voltage QRS  Borderline ECG      lle arteriogram--arteriography showed acute occlusion of distal SFA extending into P2 segment, with tandem acute occlusion of proximal tibioperoneal trunk      Pharmacomechanical thrombectomy of the SFA/pop clot as well as TP clot      Stenting of the distal SFA/proximal pop     POBA of proximal TP trunk      Starclose R groin  2 hours flat      Pt not anticipated to return to IR tomorrow    Done under conscius sedation  Past Medical History:   Diagnosis Date    Bipolar 1 disorder ([facility] )     CAD (coronary artery disease)     History of ventricular fibrillation 12/27/2019    in setting in-stent thrombosis; received defibrillation with 200J x4; received less than 30s chest compressions before ROSC achieved    [facility]     Tobacco user      Present on Admission:   Arterial occlusion   [facility] disorder [facility] )   Tobacco user      Admitting Diagnosis: Arterial occlusion [I70 90]  Age/Sex: 60 y o  male  Admission Orders:  Scheduled Medications:    Medications:  aspirin 81 mg Oral Daily   atorvastatin 80 mg Oral QPM   clopidogrel 75 mg Oral Daily   metoprolol succinate 25 mg Oral Daily   ranolazine 500 mg Oral BID   topiramate 100 mg Oral BID     Continuous IV Infusions:    heparin (porcine) 3-30 Units/kg/hr (Order-Specific) Intravenous Titrated   sodium chloride 125 mL/hr Intravenous Continuous     PRN Meds:    acetaminophen 650 mg Oral Q6H PRN   fentanyl citrate (PF)  Intravenous Code/Trauma/Sedation Med   heparin (porcine)   Code/Trauma/Sedation Med   HYDROmorphone 0 5 mg Intravenous Q3H PRN   lidocaine 1% buffered   Code/Trauma/Sedation Med   midazolam   Code/Trauma/Sedation Med   oxyCODONE 10 mg Oral Q4H PRN   oxyCODONE 5 mg Oral Q4H PRN     IR abdominal angiography and intervention  IP CONSULT TO VASCULAR SURGERY  IP CONSULT TO PODIATRY    Network Utilization Review Department  [email or web address]  ATTENTION: Please call with any questions or concerns to [phone] and carefully listen to the prompts so that you are directed to the right person  All voicemails are confidential   Madelyn Gregory all requests for admission clinical reviews, approved or denied determinations and any other requests to dedicated fax number below belonging to the campus where the patient is receiving treatment   List of dedicated fax numbers for the Facilities:  [address] DENIALS (Administrative/Medical Necessity) [phone]   [address] [place] [address] (Maternity/NICU/Pediatrics) [phone]   Salchagerardo Flores [phone]   [facility] [phone]   Daphnie Trimble [phone]   Myles Olivares [phone]   [address] [phone]   [facility]  [phone]   [address] [facility]  [address] [phone]

## 2020-02-18 NOTE — SEDATION DOCUMENTATION
Report to [facility] RN - groin site without bleeding or hematoma upon discharge from IR

## 2020-02-18 NOTE — PROGRESS NOTES
Progress Note - Emma Guerrero 60 y o  male MRN: [id number]    Unit/Bed#: [facility] 806-01 Encounter: [id number]      Assessment:  Patient is a 72-year-old male with 3 weeks of left lower extremity pain  CTA lower extremities with runoff consistent with SFA occlusion, and reconstitution in the popliteal trunk  VSS  Afebrile  B/l LE sensory and motor intact with good cap refill  B/l  palp fem pulses  B/l dopplerable pop signals  LLE w dopplerable peroneal art signal and PT signal   RLE dopp DP/PT  Plan:  NPO  F/u IR consult for possible lysis  Continue heparin gtt  Subjective:   Left lower extremity unchanged since arrival  Denied any worsening numbness or pain  Denied fever, chills, chest pain, shortness of breath, nausea, vomiting, or abdominal pain this morning  Objective:     Vitals: Blood pressure 126/67, pulse 87, temperature 98 2 °F (36 8 °C), temperature source Oral, resp  rate 16, SpO2 94 %  ,There is no height or weight on file to calculate BMI  No intake or output data in the 24 hours ending 02/18/20 0548    Physical Exam  General: NAD  HEENT: NC/AT  MMM  Cv: RRR  Lungs: normal effort  Ab: Soft, NT/ND  Ex: no CCE  B/l LE sensory and motor intact  dopplerable peroneal and pt on left and dp/pt on right     Neuro: AAOx3    Scheduled Meds:    Current Facility-Administered Medications:  acetaminophen 650 mg Oral Q6H PRN Rachelribernice Charles DO    aspirin 81 mg Oral Daily Ulices Rodriguez MD    atorvastatin 80 mg Oral QPM Ulices Rodriguez MD    clopidogrel 75 mg Oral Daily Ulices Rodriguez MD    heparin (porcine) 3-30 Units/kg/hr (Order-Specific) Intravenous Titrated Ulices Rodriguez MD Last Rate: 20 Units/kg/hr (02/18/20 0250)   HYDROmorphone 0 5 mg Intravenous Q3H PRN Simone Charles DO    metoprolol succinate 25 mg Oral Daily Ulices Rodriguez MD    oxyCODONE 10 mg Oral Q4H PRN Ephribernice Charles, DO    oxyCODONE 5 mg Oral Q4H PRN Ephribernice Charles DO    ranolazine 500 mg Oral BID Ulices Rodriguez MD    topiramate 100 mg Oral BID Renee Martinez MD      Continuous Infusions:    heparin (porcine) 3-30 Units/kg/hr (Order-Specific) Last Rate: 20 Units/kg/hr (02/18/20 0250)     PRN Meds:   acetaminophen    HYDROmorphone    oxyCODONE    oxyCODONE      Invasive Devices     Peripheral Intravenous Line            Peripheral IV 02/17/20 Right Antecubital less than 1 day                Lab, Imaging and other studies: I have personally reviewed pertinent reports      VTE Pharmacologic Prophylaxis: Heparin  VTE Mechanical Prophylaxis: sequential compression device

## 2020-02-18 NOTE — ASSESSMENT & PLAN NOTE
Three week history of pain in left foot and chronic nonhealing ulcer  Was being treated for presumed cellulitis previously, with no relief  CTA lower extremity (2/17): Subacute or chronic occlusion of left P1 and P2 segments with reconstitution at P3 segment via collaterals  Bilateral tibial peroneal disease as described above  Moderate focal stenosis at the junction of the right SFA-popliteal artery/adductor canal   Vascular surgery recs:    NPO @ midnight   Continue heparin gtt      IR consult for possible lysis tomorrow AM   Continue serial neurovascular checks

## 2020-02-18 NOTE — PLAN OF CARE
Problem: Potential for Falls  Goal: Patient will remain free of falls  Description  INTERVENTIONS:  - Assess patient frequently for physical needs  -  Identify cognitive and physical deficits and behaviors that affect risk of falls  -  [place] fall precautions as indicated by assessment   - Educate patient/family on patient safety including physical limitations  - Instruct patient to call for assistance with activity based on assessment  - Modify environment to reduce risk of injury  - Consider OT/PT consult to assist with strengthening/mobility  Outcome: Progressing     Problem: Prexisting or High Potential for Compromised Skin Integrity  Goal: Skin integrity is maintained or improved  Description  INTERVENTIONS:  - Identify patients at risk for skin breakdown  - Assess and monitor skin integrity  - Assess and monitor nutrition and hydration status  - Monitor labs   - Assess for incontinence   - Turn and reposition patient  - Assist with mobility/ambulation  - Relieve pressure over bony prominences  - Avoid friction and shearing  - Provide appropriate hygiene as needed including keeping skin clean and dry  - Evaluate need for skin moisturizer/barrier cream  - Collaborate with interdisciplinary team   - Patient/family teaching  - Consider wound care consult   Outcome: Progressing     Problem: Nutrition/Hydration-ADULT  Goal: Nutrient/Hydration intake appropriate for improving, restoring or maintaining nutritional needs  Description  Monitor and assess patient's nutrition/hydration status for malnutrition  Collaborate with interdisciplinary team and initiate plan and interventions as ordered  Monitor patient's weight and dietary intake as ordered or per policy  Utilize nutrition screening tool and intervene as necessary  Determine patient's food preferences and provide high-protein, high-caloric foods as appropriate       INTERVENTIONS:  - Monitor oral intake, urinary output, labs, and treatment plans  - Assess nutrition and hydration status and recommend course of action  - Evaluate amount of meals eaten  - Assist patient with eating if necessary   - Allow adequate time for meals  - Recommend/ encourage appropriate diets, oral nutritional supplements, and vitamin/mineral supplements  - Order, calculate, and assess calorie counts as needed  - Recommend, monitor, and adjust tube feedings and TPN/PPN based on assessed needs  - Assess need for intravenous fluids  - Provide specific nutrition/hydration education as appropriate  - Include patient/family/caregiver in decisions related to nutrition  Outcome: Progressing

## 2020-02-18 NOTE — ASSESSMENT & PLAN NOTE
IR consult for possible lysis this morning  Continue serial neurovascular checks    Three week history of pain in left foot and chronic nonhealing ulcer  Was being treated for presumed cellulitis previously, with no relief  CTA lower extremity (2/17): Subacute or chronic occlusion of left P1 and P2 segments with reconstitution at P3 segment via collaterals  Bilateral tibial peroneal disease as described above   Moderate focal stenosis at the junction of the right SFA-popliteal artery/adductor canal

## 2020-02-18 NOTE — NURSING NOTE
Pt recieved from transport and resting comfortably in bed  Paged family medicine and vascular surgery  No orders at this time

## 2020-02-19 ENCOUNTER — APPOINTMENT (INPATIENT)
Dept: NON INVASIVE DIAGNOSTICS | Facility: HOSPITAL | Age: 65
DRG: 271 | End: 2020-02-19
Payer: COMMERCIAL

## 2020-02-19 LAB
ANION GAP SERPL CALCULATED.3IONS-SCNC: 4 MMOL/L (ref 4–13)
ATRIAL RATE: 58 BPM
BUN SERPL-MCNC: 6 MG/DL (ref 5–25)
CALCIUM SERPL-MCNC: 8.5 MG/DL (ref 8.3–10.1)
CHLORIDE SERPL-SCNC: 112 MMOL/L (ref 100–108)
CO2 SERPL-SCNC: 24 MMOL/L (ref 21–32)
CREAT SERPL-MCNC: 0.8 MG/DL (ref 0.6–1.3)
ERYTHROCYTE [DISTWIDTH] IN BLOOD BY AUTOMATED COUNT: 14.1 % (ref 11.6–15.1)
EST. AVERAGE GLUCOSE BLD GHB EST-MCNC: 117 MG/DL
GFR SERPL CREATININE-BSD FRML MDRD: 96 ML/MIN/1.73SQ M
GLUCOSE SERPL-MCNC: 110 MG/DL (ref 65–140)
HBA1C MFR BLD: 5.7 %
HCT VFR BLD AUTO: 47.7 % (ref 36.5–49.3)
HGB BLD-MCNC: 15.5 G/DL (ref 12–17)
MCH RBC QN AUTO: 32.9 PG (ref 26.8–34.3)
MCHC RBC AUTO-ENTMCNC: 32.5 G/DL (ref 31.4–37.4)
MCV RBC AUTO: 101 FL (ref 82–98)
P AXIS: 59 DEGREES
PLATELET # BLD AUTO: 170 THOUSANDS/UL (ref 149–390)
PMV BLD AUTO: 10.9 FL (ref 8.9–12.7)
POTASSIUM SERPL-SCNC: 4 MMOL/L (ref 3.5–5.3)
PR INTERVAL: 138 MS
QRS AXIS: -35 DEGREES
QRSD INTERVAL: 76 MS
QT INTERVAL: 436 MS
QTC INTERVAL: 428 MS
RBC # BLD AUTO: 4.71 MILLION/UL (ref 3.88–5.62)
SODIUM SERPL-SCNC: 140 MMOL/L (ref 136–145)
T WAVE AXIS: 23 DEGREES
VENTRICULAR RATE: 58 BPM
WBC # BLD AUTO: 9.41 THOUSAND/UL (ref 4.31–10.16)

## 2020-02-19 PROCEDURE — 80048 BASIC METABOLIC PNL TOTAL CA: CPT | Performed by: PHYSICIAN ASSISTANT

## 2020-02-19 PROCEDURE — 83036 HEMOGLOBIN GLYCOSYLATED A1C: CPT | Performed by: PHYSICIAN ASSISTANT

## 2020-02-19 PROCEDURE — 85027 COMPLETE CBC AUTOMATED: CPT | Performed by: PHYSICIAN ASSISTANT

## 2020-02-19 PROCEDURE — 99232 SBSQ HOSP IP/OBS MODERATE 35: CPT | Performed by: SURGERY

## 2020-02-19 PROCEDURE — 99232 SBSQ HOSP IP/OBS MODERATE 35: CPT | Performed by: FAMILY MEDICINE

## 2020-02-19 PROCEDURE — 93010 ELECTROCARDIOGRAM REPORT: CPT | Performed by: INTERNAL MEDICINE

## 2020-02-19 RX ADMIN — TOPIRAMATE 100 MG: 100 TABLET, FILM COATED ORAL at 17:27

## 2020-02-19 RX ADMIN — OXYCODONE HYDROCHLORIDE 10 MG: 10 TABLET ORAL at 10:02

## 2020-02-19 RX ADMIN — ATORVASTATIN CALCIUM 80 MG: 80 TABLET, FILM COATED ORAL at 17:27

## 2020-02-19 RX ADMIN — CLOPIDOGREL BISULFATE 75 MG: 75 TABLET ORAL at 08:13

## 2020-02-19 RX ADMIN — RANOLAZINE 500 MG: 500 TABLET, FILM COATED, EXTENDED RELEASE ORAL at 17:27

## 2020-02-19 RX ADMIN — TOPIRAMATE 100 MG: 100 TABLET, FILM COATED ORAL at 08:13

## 2020-02-19 RX ADMIN — OXYCODONE HYDROCHLORIDE 5 MG: 5 TABLET ORAL at 17:28

## 2020-02-19 RX ADMIN — RANOLAZINE 500 MG: 500 TABLET, FILM COATED, EXTENDED RELEASE ORAL at 08:13

## 2020-02-19 RX ADMIN — SODIUM CHLORIDE 125 ML/HR: 0.9 INJECTION, SOLUTION INTRAVENOUS at 00:48

## 2020-02-19 RX ADMIN — ASPIRIN 81 MG 81 MG: 81 TABLET ORAL at 08:13

## 2020-02-19 NOTE — DISCHARGE INSTRUCTIONS
DISCHARGE INSTRUCTIONS  ARTERIOGRAM/ANGIOPLASTY/STENT    Following discharge from the hospital, you may have some questions about your procedure, your activities or your general condition  These instructions may answer some of your questions and help you adjust during the first few weeks following your operation  ACTIVITY: The evening following the procedure you should be sure someone remains with you until the next morning  Rest as much as possible, sitting, lying or reclining  Use the stairs as little as possible  The following day, limit your activity to walking  Avoid stooping or heavy lifting (no more than 30 lbs) for the first three days  Walking up steps and normal activities may be resumed as you feel ready  You should not drive a car for at least two days following discharge from the hospital  You may ride in a car  If you have any questions regarding a particular activity, please discuss with your doctor or nurse before you are discharged  DIET:  Resume your normal diet  Try to eat low fat and low cholesterol foods  Drink more liquids than usual for the next 24 hours  INCISION: Your doctor may have chosen to use a type of adhesive glue, to close your incision  The glue is used to cover the incision, assist in closure, and prevent contamination  This adhesive will darken and peel away on its own within one to two weeks  You may shower after the procedure, but do not scrub the incision  Sitting in a tub is not recommended for the two days following the procedure or if you have any open wounds  It is normal to have some bruising, swelling or mild discoloration around the incision  IF increasing redness or pain develops, call our office immediately  If present, you may remove the band-aid or steri-strips over your wound after two days  If you notice any active bleeding at the site, apply pressure to the site and call our office ([phone])      FOLLOW UP STUDIES:  Your doctor will discuss whether further treatments or follow-up studies are necessary at your first post procedure visit  Appt w/ Dr Joseph Younger: 3/4/2020 at 10:30am, [facility] office  [address] [facility], [address] [place] [address], [address] [place] [address] THE OFFICE IF YOU HAVE ANY QUESTIONS  [phone] Costa Busbyr [phone] [facility] [phone]  [address] [place] [address] , [address], [place], [address]  [address], [address], [place] Javier, [address]  [address], [place], [address]  [address] [facility], [facility], [address] [facility], [address]    [facility], [address]th [address], [address]  [address] [facility] [address]   [address] [facility], [address] [place] [address], [address] [employer]  [address] [facility], [address] [facility], [place], [place] 6      _____________________________________________________________________________    Angiography   WHAT YOU SHOULD KNOW:   Angiography is a procedure to look at blood vessels in your body  Angiography can be used to look for narrowing, a blockage, or problems with your blood vessels  It can also be used to check blood flow to organs such as your heart, lungs, or kidneys  Angiography can help your healthcare provider diagnose or treat a medical condition  INSTRUCTIONS:   Drink liquids as directed:  Liquids will help flush the contrast liquid out of your body  Ask how much liquid to drink after your procedure, and which liquids to drink  Activities: You may need to rest and avoid moving the area where the catheter was inserted  Too much activity may increase your risk for bleeding  Ask your healthcare provider when you can return to normal activities  Follow up with your healthcare provider as directed:  Write down your questions so you remember to ask them during your visits  Contact your healthcare provider if:   · You have pain or bleeding where the catheter was inserted  · You have signs of an infection, such as redness and swelling at the injection site  · You have numbness or tingling in an arm or leg  · You have questions or concerns about your condition or care  Return to the emergency department if:  You have any signs of an allergic reaction to the contrast liquid, such as:  · Chest pain or trouble breathing    · Dizziness or fainting    · Swelling of your mouth or face    · Nausea or vomiting    · Sudden decrease in urination    · A rash, itching, or swollen skin  © 2014 [address] [employer] is for End User's use only and may not be sold, redistributed or otherwise used for commercial purposes  All illustrations and images included in CareNotes® are the copyrighted property of A D A M , Inc  or [employer]  The above information is an  only  It is not intended as medical advice for individual conditions or treatments  Talk to your doctor, nurse or pharmacist before following any medical regimen to see if it is safe and effective for you  Discharge Instructions - Podiatry    Weight Bearing Status:  Weight bear as toelrated                       Pain: Continue analgesics as directed    Follow-up appointment instructions: Please make an appointment within one week of discharge with Dr Deanna Adams  Contact sooner if any increase in pain, or signs of infection occur    Wound Care: paint the wound with Iodine paint daily   Make sure to wash feet and inspect daily

## 2020-02-19 NOTE — PLAN OF CARE
Problem: Potential for Falls  Goal: Patient will remain free of falls  Description  INTERVENTIONS:  - Assess patient frequently for physical needs  -  Identify cognitive and physical deficits and behaviors that affect risk of falls  -  [place] fall precautions as indicated by assessment   - Educate patient/family on patient safety including physical limitations  - Instruct patient to call for assistance with activity based on assessment  - Modify environment to reduce risk of injury  - Consider OT/PT consult to assist with strengthening/mobility  Outcome: Progressing     Problem: Prexisting or High Potential for Compromised Skin Integrity  Goal: Skin integrity is maintained or improved  Description  INTERVENTIONS:  - Identify patients at risk for skin breakdown  - Assess and monitor skin integrity  - Assess and monitor nutrition and hydration status  - Monitor labs   - Assess for incontinence   - Turn and reposition patient  - Assist with mobility/ambulation  - Relieve pressure over bony prominences  - Avoid friction and shearing  - Provide appropriate hygiene as needed including keeping skin clean and dry  - Evaluate need for skin moisturizer/barrier cream  - Collaborate with interdisciplinary team   - Patient/family teaching  - Consider wound care consult   Outcome: Progressing     Problem: Nutrition/Hydration-ADULT  Goal: Nutrient/Hydration intake appropriate for improving, restoring or maintaining nutritional needs  Description  Monitor and assess patient's nutrition/hydration status for malnutrition  Collaborate with interdisciplinary team and initiate plan and interventions as ordered  Monitor patient's weight and dietary intake as ordered or per policy  Utilize nutrition screening tool and intervene as necessary  Determine patient's food preferences and provide high-protein, high-caloric foods as appropriate       INTERVENTIONS:  - Monitor oral intake, urinary output, labs, and treatment plans  - Assess nutrition and hydration status and recommend course of action  - Evaluate amount of meals eaten  - Assist patient with eating if necessary   - Allow adequate time for meals  - Recommend/ encourage appropriate diets, oral nutritional supplements, and vitamin/mineral supplements  - Order, calculate, and assess calorie counts as needed  - Recommend, monitor, and adjust tube feedings and TPN/PPN based on assessed needs  - Assess need for intravenous fluids  - Provide specific nutrition/hydration education as appropriate  - Include patient/family/caregiver in decisions related to nutrition  Outcome: Progressing

## 2020-02-19 NOTE — SOCIAL WORK
CM met with patient at bedside to discuss CM role in dcp  Patient lives alone in apartment, [address]  Patient reports independent pta with adl's/ambulation  Patient reports that he does drive but does not have a car at this time  Patient takes public transport  Patient's emergency contact is Robert [phone]  Patient reports that he does not have DME  Patient has PCP; Jessica DONALD, DO and pharmacy; [facility] [address]  Patient reports IP rehab hx years ago and denies HHC  Patient denies IPMH/MH and drug/alcohol tx  CM reviewed d/c planning process including the following: identifying help at home, patient preference for d/c planning needs, Discharge Lounge, [facility] Meds to Bed program, availability of treatment team to discuss questions or concerns patient and/or family may have regarding understanding medications and recognizing signs and symptoms once discharged  CM also encouraged patient to follow up with all recommended appointments after discharge  Patient advised of importance for patient and family to participate in managing patients medical well being  Patient reviewed in care coordination rounds  Patient not medically clear for d/c today, reported low heart rate  Patient likely to d/c end of week v weekend  CM following

## 2020-02-19 NOTE — QUICK NOTE
Post op check - Vascular Surgery     Yazmin Guerrero 60 y o  male MRN: [id number]  Unit/Bed#: [facility] 806-01 Encounter: [id number]    Assessment:  61 yo male with nonhealing wounds of LLE, now s/p IR LLE Agram w/ pharmacomechanical thrombectomy of SFA/ popliteal & TPT clot  Distal SFA/ proximal popliteal stenting  PTA of proximal TPT doing well overall  Has improved sensation in foot and decreasing pain  Plan:  Diet -- Cardiac diet  Pain control -- Oxy 5, 10 and Dilaudid 0 5  Bedrest post procedure  Routine neurovascular checks  ASA/clopidogrel/statin  NS @125      Subjective/Objective     Subjective: no new complaints since IR procedure  Objective:     Vitals: Blood pressure 140/61, pulse 56, temperature 97 5 °F (36 4 °C), temperature source Oral, resp  rate 14, SpO2 99 %  ,There is no height or weight on file to calculate BMI  I/O       02/17 0701 - 02/18 0700 02/18 0701 - 02/19 0700    P  O   702    Total Intake  702    Urine  650    Total Output  650    Net  +52                Physical Exam:  GEN: NAD  HEENT: MMM  CV: RRR  Lung: normal effort  Ab: Soft, NT/ND  Extrem: motor intact, sensation diminished on the left compared to right  Pulses:  L: doppler AT/DP and PT  R: groin access site c/d/i covered with gauze and tegaderm  Doppler DP, faint PT  Neuro:  A+Ox3,       Lab, Imaging and other studies:   CBC with diff:   Lab Results   Component Value Date    WBC 7 66 02/18/2020    HGB 16 2 02/18/2020    HCT 49 0 02/18/2020    MCV 99 (H) 02/18/2020     02/18/2020    MCH 32 6 02/18/2020    MCHC 33 1 02/18/2020    RDW 14 2 02/18/2020    MPV 11 8 02/18/2020   , BMP/CMP:   Lab Results   Component Value Date    SODIUM 142 02/18/2020    K 3 4 (L) 02/18/2020     (H) 02/18/2020    CO2 27 02/18/2020    BUN 9 02/18/2020    CREATININE 0 70 02/18/2020    CALCIUM 8 9 02/18/2020    EGFR 101 02/18/2020     VTE Pharmacologic Prophylaxis: Heparin  VTE Mechanical Prophylaxis: sequential compression device

## 2020-02-19 NOTE — PROGRESS NOTES
Progress Note - Jorgito Kumar 1955, 60 y o  male MRN: [id number]    Unit/Bed#: [facility] 806-01 Encounter: [id number]    Primary Care Provider: Silva Sever, DO   Date and time admitted to hospital: 2/17/2020  8:27 PM        * Arterial occlusion  Assessment & Plan  s/p IR LLE Agram w/ pharmacomechanical thrombectomy of SFA/ popliteal & TPT clot  Distal SFA/ proximal popliteal stenting  PTA of proximal TPT with improvement in pain in feet  LLE wound following with podiatry  Continue serial neurovascular checks    Three week history of pain in left foot and chronic nonhealing ulcer  Was being treated for presumed cellulitis previously, with no relief  CTA lower extremity (2/17): Subacute or chronic occlusion of left P1 and P2 segments with reconstitution at P3 segment via collaterals  Bilateral tibial peroneal disease as described above  Moderate focal stenosis at the junction of the right SFA-popliteal artery/adductor canal         S/P coronary artery stent placement  Assessment & Plan  Stent placed in 12/2019  Stable, continue ASA and plavix    Bipolar 1 disorder (HCC)  Assessment & Plan  Stable  Continue PTA Topiramate    Tobacco user  Assessment & Plan  Current every day smoker, 1 5 PPD  Refused nicotine patch   on smoking cessation       VTE Pharmacologic Prophylaxis: Heparin  VTE Mechanical Prophylaxis: sequential compression device  Diet: regular  Code Status:level 3  Disposition:inpatient care    Discussed with attending physician, Dr Yamileth Harvey, and [facility] team     Subjective:   No acute event overnight  States pain is improved, 5-6/10 now       Objective:   Current Facility-Administered Medications   Medication Dose Route Frequency    acetaminophen (TYLENOL) tablet 650 mg  650 mg Oral Q6H PRN    aspirin chewable tablet 81 mg  81 mg Oral Daily    atorvastatin (LIPITOR) tablet 80 mg  80 mg Oral QPM    clopidogrel (PLAVIX) tablet 75 mg  75 mg Oral Daily    HYDROmorphone (DILAUDID) injection 0 5 mg  0 5 mg Intravenous Q3H PRN    metoprolol succinate (TOPROL-XL) 24 hr tablet 25 mg  25 mg Oral Daily    oxyCODONE (ROXICODONE) immediate release tablet 10 mg  10 mg Oral Q4H PRN    oxyCODONE (ROXICODONE) IR tablet 5 mg  5 mg Oral Q4H PRN    ranolazine (RANEXA) 12 hr tablet 500 mg  500 mg Oral BID    sodium chloride 0 9 % infusion  125 mL/hr Intravenous Continuous    topiramate (TOPAMAX) tablet 100 mg  100 mg Oral BID     Allergies   Allergen Reactions    Fish-Derived Products Hives    Rice Bean [Phaseolus] Hives       Labs:  Results from last 7 days   Lab Units 02/19/20 0520 02/18/20 0618 02/17/20 2233 02/17/20  1527   WBC Thousand/uL 9 41 7 66 10 46* 14 10*   HEMOGLOBIN g/dL 15 5 16 2 16 6 18 1*   HEMATOCRIT % 47 7 49 0 50 0* 53 1*   PLATELETS Thousands/uL 170 197 214 238   NEUTROS PCT %  --   --   --  66   MONOS PCT %  --   --   --  6     Results from last 7 days   Lab Units 02/19/20 0520 02/18/20 0618 02/17/20  1527   POTASSIUM mmol/L 4 0 3 4* 3 5   CHLORIDE mmol/L 112* 110* 104   CO2 mmol/L 24 27 29   BUN mg/dL 6 9 12   CREATININE mg/dL 0 80 0 70 0 96   CALCIUM mg/dL 8 5 8 9 9 4   ALK PHOS U/L  --   --  70   ALT U/L  --   --  25   AST U/L  --   --  9         No results found for: PHOS   Results from last 7 days   Lab Units 02/18/20 0618 02/17/20 2233 02/17/20  1527   INR   --  1 13 1 00   PTT seconds 51* 47* 26         0   Lab Value Date/Time    TROPONINI 7 08 (H) 12/28/2019 0543    TROPONINI 7 97 (H) 12/27/2019 1610    TROPONINI 5 01 (H) 12/27/2019 1250    TROPONINI 0 20 (H) 12/27/2019 0657    TROPONINI 4 38 (H) 11/06/2019 1021    TROPONINI 6 07 (H) 11/06/2019 0606    TROPONINI 5 49 (H) 11/06/2019 0124    TROPONINI 4 89 (H) 11/05/2019 2237    TROPONINI 1 15 (H) 11/05/2019 1943    TROPONINI 2 16 (H) 08/14/2019 0105    TROPONINI 1 53 (H) 08/13/2019 2309    TROPONINI 0 87 (H) 08/13/2019 2108    TROPONINI 0 21 (H) 08/13/2019 1731     ABG:No results found for: PHART, PBZ8TXY, PO2ART, WML4HAV, U3TBLNOF, BEART, SOURCE     Imaging/Other:        Vitals:  Vitals:    02/18/20 1818 02/18/20 2353 02/19/20 0329 02/19/20 0700   BP: 140/61 (!) 102/46 102/56 105/57   Pulse: 56 60 57 60   Resp: 14 17 18 19   Temp: 97 5 °F (36 4 °C) 97 8 °F (36 6 °C) 97 9 °F (36 6 °C) 97 7 °F (36 5 °C)   TempSrc: Oral Oral Oral Oral   SpO2: 99% 97% 97% 98%         Intake/Output Summary (Last 24 hours) at 2/19/2020 0932  Last data filed at 2/19/2020 0901  Gross per 24 hour   Intake 1735 33 ml   Output 750 ml   Net 985 33 ml       Physical Exam:   General: No acute distress  HEENT  Head: NC/AT  Eyes: Pupils equal and reactive to light  Mouth: Mucus membranes moist  Neck: No lymphadenopathy   Cardio: Normal S1 S2, regular rate and rhythm  Resp: Good respiratory effort, lungs clear to auscultation bilaterally  Abdomen: Soft, non distended, non tender to palpation, normal active bowel sounds  Extremities: No LE edema  Motor intact  Skin: Warm, dry, no rash, Positive for poor hygiene, multiple scattered healing scars on the left leg, ulcer on the lateral left foot    Neuro: oriented x3, 5/5 strength in UE and LE bilaterally, CN II-XII intact  Psych: normal behavior    Invasive Devices     Peripheral Intravenous Line            Peripheral IV 02/17/20 Right Antecubital 1 day                  AJAY ESTES, PGY-1  [address]

## 2020-02-19 NOTE — PLAN OF CARE
Problem: Potential for Falls  Goal: Patient will remain free of falls  Description  INTERVENTIONS:  - Assess patient frequently for physical needs  -  Identify cognitive and physical deficits and behaviors that affect risk of falls  -  [place] fall precautions as indicated by assessment   - Educate patient/family on patient safety including physical limitations  - Instruct patient to call for assistance with activity based on assessment  - Modify environment to reduce risk of injury  - Consider OT/PT consult to assist with strengthening/mobility  Outcome: Progressing     Problem: Prexisting or High Potential for Compromised Skin Integrity  Goal: Skin integrity is maintained or improved  Description  INTERVENTIONS:  - Identify patients at risk for skin breakdown  - Assess and monitor skin integrity  - Assess and monitor nutrition and hydration status  - Monitor labs   - Assess for incontinence   - Turn and reposition patient  - Assist with mobility/ambulation  - Relieve pressure over bony prominences  - Avoid friction and shearing  - Provide appropriate hygiene as needed including keeping skin clean and dry  - Evaluate need for skin moisturizer/barrier cream  - Collaborate with interdisciplinary team   - Patient/family teaching  - Consider wound care consult   Outcome: Progressing     Problem: Nutrition/Hydration-ADULT  Goal: Nutrient/Hydration intake appropriate for improving, restoring or maintaining nutritional needs  Description  Monitor and assess patient's nutrition/hydration status for malnutrition  Collaborate with interdisciplinary team and initiate plan and interventions as ordered  Monitor patient's weight and dietary intake as ordered or per policy  Utilize nutrition screening tool and intervene as necessary  Determine patient's food preferences and provide high-protein, high-caloric foods as appropriate       INTERVENTIONS:  - Monitor oral intake, urinary output, labs, and treatment plans  - Assess nutrition and hydration status and recommend course of action  - Evaluate amount of meals eaten  - Assist patient with eating if necessary   - Allow adequate time for meals  - Recommend/ encourage appropriate diets, oral nutritional supplements, and vitamin/mineral supplements  - Order, calculate, and assess calorie counts as needed  - Recommend, monitor, and adjust tube feedings and TPN/PPN based on assessed needs  - Assess need for intravenous fluids  - Provide specific nutrition/hydration education as appropriate  - Include patient/family/caregiver in decisions related to nutrition  Outcome: Progressing

## 2020-02-20 ENCOUNTER — APPOINTMENT (INPATIENT)
Dept: NON INVASIVE DIAGNOSTICS | Facility: HOSPITAL | Age: 65
DRG: 271 | End: 2020-02-20
Payer: COMMERCIAL

## 2020-02-20 PROCEDURE — 99232 SBSQ HOSP IP/OBS MODERATE 35: CPT | Performed by: FAMILY MEDICINE

## 2020-02-20 PROCEDURE — 99232 SBSQ HOSP IP/OBS MODERATE 35: CPT | Performed by: PODIATRIST

## 2020-02-20 PROCEDURE — 93926 LOWER EXTREMITY STUDY: CPT

## 2020-02-20 RX ADMIN — TOPIRAMATE 100 MG: 100 TABLET, FILM COATED ORAL at 09:21

## 2020-02-20 RX ADMIN — METOPROLOL SUCCINATE 25 MG: 25 TABLET, EXTENDED RELEASE ORAL at 09:21

## 2020-02-20 RX ADMIN — ASPIRIN 81 MG 81 MG: 81 TABLET ORAL at 09:21

## 2020-02-20 RX ADMIN — RANOLAZINE 500 MG: 500 TABLET, FILM COATED, EXTENDED RELEASE ORAL at 17:49

## 2020-02-20 RX ADMIN — RANOLAZINE 500 MG: 500 TABLET, FILM COATED, EXTENDED RELEASE ORAL at 09:21

## 2020-02-20 RX ADMIN — CLOPIDOGREL BISULFATE 75 MG: 75 TABLET ORAL at 09:21

## 2020-02-20 RX ADMIN — TOPIRAMATE 100 MG: 100 TABLET, FILM COATED ORAL at 17:49

## 2020-02-20 RX ADMIN — ATORVASTATIN CALCIUM 80 MG: 80 TABLET, FILM COATED ORAL at 17:49

## 2020-02-20 NOTE — UTILIZATION REVIEW
Notification of Discharge  This is a Notification of Discharge from our facility [address]  Please be advised that this patient has been discharge from our facility  Below you will find the admission and discharge date and time including the patients disposition  PRESENTATION DATE: 2/17/2020  2:46 PM  OBS ADMISSION DATE:   IP ADMISSION DATE: 2/17/20 1704   DISCHARGE DATE: 2/17/2020  7:46 PM  DISPOSITION: [address]   Admission Orders listed below:  Admission Orders (From admission, onward)     Ordered        02/17/20 1705  Inpatient Admission  Once                   Please contact the UR Department if additional information is required to close this patient's authorization/case  [facility] Utilization Review Department  Main: [phone] x carefully listen to the prompts  All voicemails are confidential   [email or web address]  Send all requests for admission clinical reviews, approved or denied determinations and any other requests to dedicated fax number below belonging to the campus where the patient is receiving treatment   List of dedicated fax numbers:  [address] DENIALS (Administrative/Medical Necessity) [phone]   [address] (Maternity/NICU/Pediatrics) [phone]   [facility] [phone]   Amos Musc [phone]   Juan Pablo Calix [phone]   Jose De Jesusisabelle [address] [phone]   [facility]  [phone]   [address] [facility] [address]  2401 [facility] 1000 [address] [facility] [phone]

## 2020-02-20 NOTE — SOCIAL WORK
Patient reviewed in care coordination rounds  Patient not medically clear for d/c  CM following  Patient independent pta

## 2020-02-20 NOTE — PROGRESS NOTES
Progress Note - Dary Guerrero 60 y o  male MRN: [id number]    Unit/Bed#: [facility] 806-01 Encounter: [id number]      Assessment & Plan:    Arterial occlusion  Per Vascular surgery:  - s/p IR LLE Agram w/ pharmacomechanical thrombectomy of SFA/ popliteal & TPT clot  - Distal SFA/ proximal popliteal stenting  PTA of proximal TPT with improvement in pain in feet  - Continue serial neurovascular checks  - Cardiac diet  - ASA/plavix/statin  - [email or web address] - can discontinue as long as urine output is adequate    Patient presents with 3 week history of left leg and foot pain and chronic unhealing ulcer  Previously treated with antibiotics for a presumed cellulitis with no resolution  CTA lower extremity (2/17) subacute or chronic occlusion of P1 and P2 segments with reconstitution at P3 segment via collaterals  Bilateral tibial peroneal disease as described above  Moderate focial stenosis at the junction of the right SFA-popliteal artery/adductor canal        Left lateral foot eschar without signs of infection, PAD  Per Podiatry:  - local wound care with bentadine paint open to air changed daily  - no surgical planning at this time  - follow-up x-ray of left foot  - post agram LEADS to be determined by vascular team  Per Radiology:   - Ischemic ulcer of toe of left foot, with unspecified severity  - Peripheral artery diseas      S/P coronary artery stent placement  - stent placed in 12/2019          [facility] )  - stable  - continue PTA Topiramate        Tobacco user  - current everyday smoker, 1 5 PPD  - refused nicotine patch  -  on smoking cessation         Subjective:   Patient slept well and denies any new changes or symptoms  Overnight: patient reports no acute events overnight  Sensation and pain in left leg and foot is improving each day, patient rates pain today a 5/10        Objective:     Vitals: Blood pressure 105/61, pulse (!) 50, temperature 97 9 °F (36 6 °C), temperature source Oral, resp  rate 16, height 5' 10" (1 778 m), weight 93 9 kg (207 lb 0 2 oz), SpO2 98 %  ,Body mass index is 29 7 kg/m²  Intake/Output Summary (Last 24 hours) at 2/20/2020 1023  Last data filed at 2/20/2020 0400  Gross per 24 hour   Intake 542 ml   Output 0 ml   Net 542 ml       Physical Exam:  General: patient is alert, lying comfortably in bed and in no acute distress  HEENT: normocephalic, atraumatic, pupils equal and reactive to light, no lymphadenopathy  Cardio: Normal S1/S2, regular rate and rhythm, no murmurs, rubs or gallops  Respiratory: normal respiratory effort, lungs clear bilaterally, no wheezing, rales or rhonchi   Abdominal: soft, non-distended, non-tender to palpation, normal active bowel sounds  Extremities: No gross LE edema, patient able to ambulate normally  Skin: warm, dry, positive for poor hygiene, multiple scattered healing scars on left leg, poorly healing ulcer on left lateral foot      Invasive Devices     Peripheral Intravenous Line            Peripheral IV 02/19/20 Right;Upper Arm less than 1 day                Lab, Imaging and other studies: I have personally reviewed pertinent reports

## 2020-02-20 NOTE — UTILIZATION REVIEW
Initial Clinical Review         Admission: Date/Time/Statement: Admission Orders (From admission, onward)              Ordered          02/17/20 2058   Inpatient Admission  Once                             Orders Placed This Encounter   Procedures    Inpatient Admission       Standing Status:   Standing       Number of Occurrences:   1       Order Specific Question:   Admitting Physician       Answer:   GABRIELLE HANNA [[id number]]       Order Specific Question:   Level of Care       Answer:   Med Surg [16]       Order Specific Question:   Estimated length of stay       Answer:   More than 2 Midnights       Order Specific Question:   Certification       Answer:   I certify that inpatient services are medically necessary for this patient for a duration of greater than two midnights  See H&P and MD Progress Notes for additional information about the patient's course of treatment           Assessment/Plan: 60 yr old male to the [place] directly as a transfer from [address] [place] [address] where he presented to the ed with pain in his left foot for the past 3 weeks  He was previously being treated for cellulitis with no relief or change and is significantly worse and constant  The doppler at [place] could not  and signal   He was placed and a heparin drip and transferred to [place]  The plan is cta of lower extremity which showed subacute or chronic occlusion of left p1 and p2 segments with reconstitution at p2  Npo after mn continue heparin drip, ir consult for possible lysis in am , continue serial neurovascular checks      Consult podiatry for pa ad left foot gangrene   He is admitted as an inpatient with arterial occlusion                                                                                                  ED Triage Vitals   Temperature Pulse Respirations Blood Pressure SpO2   02/18/20 0005 02/18/20 0005 02/18/20 0005 02/18/20 0005 02/18/20 0005   98 2 °F (36 8 °C) 87 18 126/67 94 %       Temp Source Heart Rate Source Patient Position - Orthostatic VS BP Location FiO2 (%)   02/18/20 0005 -- -- -- --   Oral               Pain Score           02/17/20 2308           6                Wt Readings from Last 1 Encounters:   02/17/20 93 9 kg (207 lb 0 2 oz)      Additional Vital Signs:   /18/20 1142   --   46Abnormal    15   116/74   91   97 %   --   02/18/20 1137   --   44Abnormal    14   115/70   89   98 %   --   02/18/20 1132   --   48Abnormal    13   118/70   89   100 %   --   02/18/20 1127   --   48Abnormal    15   119/71   91   99 %   --   02/18/20 1122   --   45Abnormal    15   111/66   83   99 %   --   02/18/20 1117   --   46Abnormal    14   115/70   87   99 %   --   02/18/20 1112   --   51Abnormal    14   117/70   90   99 %   --   02/18/20 1107   --   46Abnormal    14   113/65   85   99 %   --   02/18/20 1102   --   48Abnormal    14   107/65   81   99 %   --   02/18/20 1057   --   54Abnormal    15   116/79   92   98 %   --   02/18/20 10:54:59   --   --   --   --   --   97 %   Nasal cannula   02/18/20 1050   --   45Abnormal    16   116/63   --   99 %   --         Pertinent Labs/Diagnostic Test Results:          Results from last 7 days   Lab Units 02/18/20  0618 02/17/20  2233 02/17/20  1527   WBC Thousand/uL 7 66 10 46* 14 10*   HEMOGLOBIN g/dL 16 2 16 6 18 1*   HEMATOCRIT % 49 0 50 0* 53 1*   PLATELETS Thousands/uL 197 214 238   NEUTROS ABS Thousands/µL  --   --  9 32*          Results from last 7 days   Lab Units 02/18/20  0618 02/17/20  1527   SODIUM mmol/L 142 139   POTASSIUM mmol/L 3 4* 3 5   CHLORIDE mmol/L 110* 104   CO2 mmol/L 27 29   ANION GAP mmol/L 5 6   BUN mg/dL 9 12   CREATININE mg/dL 0 70 0 96   EGFR ml/min/1 73sq m 101 84   CALCIUM mg/dL 8 9 9 4           Results from last 7 days   Lab Units 02/17/20  1527   AST U/L 9   ALT U/L 25   ALK PHOS U/L 70   TOTAL PROTEIN g/dL 7 4   ALBUMIN g/dL 3 4*   TOTAL BILIRUBIN mg/dL 0 30                Results from last 7 days   Lab Units 02/18/20  0618 02/17/20  1527   GLUCOSE RANDOM mg/dL 95 99             Results from last 7 days   Lab Units 02/18/20  0618 02/17/20  2233 02/17/20  1527   PROTIME seconds  --  14 1 10 7   INR    --  1 13 1 00   PTT seconds 51* 47* 26   cta of lower extremity-Subacute or chronic occlusion of left P1 and P2 segments with reconstitution at P3 segment via collaterals        Bilateral tibial peroneal disease as described above        Moderate focal stenosis at the junction of the right SFA-popliteal artery/adductor canal -  Vas lower limb--leftLEFT LOWER LIMB:  Evaluation shows acute occlusion of distal superficial femoral artery and  popliteal artery  Popliteal artery is ectatic measuring 1 0 cm  Ankle/Brachial Index: 0 45, which is ischemic range  Prior 1 0 (11/7/2019)  PVR/ PPG tracings are severely dampened  Metatarsal pressure of 45 mmHg  Great toe pressure of 0 mmHg, flat lined      ekg-Normal sinus rhythm  Possible Left atrial enlargement  Low voltage QRS  Borderline ECG        lle arteriogram--arteriography showed acute occlusion of distal SFA extending into P2 segment, with tandem acute occlusion of proximal tibioperoneal trunk      Pharmacomechanical thrombectomy of the SFA/pop clot as well as TP clot      Stenting of the distal SFA/proximal pop     POBA of proximal TP trunk      Starclose R groin   2 hours flat      Pt not anticipated to return to IR tomorrow  Done under conscius sedation  Medical History   Past Medical History:   Diagnosis Date    Bipolar 1 disorder ([facility] )      CAD (coronary artery disease)      History of ventricular fibrillation 12/27/2019     in setting in-stent thrombosis; received defibrillation with 200J x4; received less than 30s chest compressions before ROSC achieved    [facility]      Tobacco user           Present on Admission:   Arterial occlusion   B[facility] 1 disorder ([facility]   Tobacco user        Admitting Diagnosis: Arterial occlusion [I70 90]  Age/Sex: 60 y o  male  Admission Orders:  Scheduled Medications:     Medications:  aspirin 81 mg Oral Daily   atorvastatin 80 mg Oral QPM   clopidogrel 75 mg Oral Daily   metoprolol succinate 25 mg Oral Daily   ranolazine 500 mg Oral BID   topiramate 100 mg Oral BID      Continuous IV Infusions:     heparin (porcine) 3-30 Units/kg/hr (Order-Specific) Intravenous Titrated   sodium chloride 125 mL/hr Intravenous Continuous      PRN Meds:     acetaminophen 650 mg Oral Q6H PRN   fentanyl citrate (PF)   Intravenous Code/Trauma/Sedation Med   heparin (porcine)     Code/Trauma/Sedation Med   HYDROmorphone 0 5 mg Intravenous Q3H PRN   lidocaine 1% buffered     Code/Trauma/Sedation Med   midazolam     Code/Trauma/Sedation Med   oxyCODONE 10 mg Oral Q4H PRN   oxyCODONE 5 mg Oral Q4H PRN      IR abdominal angiography and intervention  IP CONSULT TO VASCULAR SURGERY  IP CONSULT TO PODIATRY     Network Utilization Review Department  [email or web address]  ATTENTION: Please call with any questions or concerns to [phone] and carefully listen to the prompts so that you are directed to the right person  All voicemails are confidential   Tracie Wiley all requests for admission clinical reviews, approved or denied determinations and any other requests to dedicated fax number below belonging to the campus where the patient is receiving treatment   List of dedicated fax numbers for the Facilities:  FACILITY NAME UR FAX NUMBER   ADMISSION DENIALS (Administrative/Medical Necessity) [phone]   [address]  (Maternity/NICU/Pediatrics) [phone]   [place] campus [phone]   [facility] [phone]   [facility] [address] [facility] Lynnette Estação  [place] [address] [facility] [address] [facility] [phone]

## 2020-02-20 NOTE — PROGRESS NOTES
[facility] Podiatry Progress Note  Patient: Margareth Guerrero 60 y o  male   MRN: [id number]  PCP: Luis Carlos Brown DO  Unit/Bed#: [facility] 356-11 Encounter: [id number]  Date Of Visit: 20    Assessment:    Podiatric Assessment:   Left foot lateral foot eschar noted without signs of infection   PAD    Principal Problem:    Arterial occlusion  Active Problems:    Bipolar 1 disorder (HCC)    Tobacco user    S/P coronary artery stent placement        Plan:    · Continue local wound care with Betadine paint open air with application of Betadine daily  · No surgical planning at this time  · Radiographs reviewed showing NO signs of soft tissue emphysema or bony erosion consistent with osteomyelitis, will defer further imaging at this time  · Still pending lead final read  · Stable for discharge  · Weight-bearing as tolerated bilaterally  · Will confirm the plan my attending           Subjective    Margareth Guerrero was seen and evaluated at bedside  He denies pain at this time  Denies changes in the foot  Feet still appear to be in poorly washout  He is speaking on the phone to a significant other during my examination and is not wanting appeared in denies nausea vomiting fever chills chest pain shortness breath    Objective:     Vitals:   Temp (24hrs), Av 7 °F (36 5 °C), Min:97 5 °F (36 4 °C), Max:97 9 °F (36 6 °C)    Temp:  [97 5 °F (36 4 °C)-97 9 °F (36 6 °C)] 97 9 °F (36 6 °C)  HR:  [50-59] 50  Resp:  [14-18] 16  BP: (102-134)/(54-86) 108/54  SpO2:  [98 %-100 %] 100 %  Body mass index is 29 7 kg/m²  Foot Exam    General: Alert, cooperative and no distress  Lungs: Non labored breathing  Abdomen: Soft, non-tender  Extremity:     NVS at baseline B/l  MSK function at baseline B/l  No calf tenderness noted B/l  Wound located left lateral foot, measures 2 cm x 1 5 cm x 0 1 cm  without sinus tracking or undermining  Wound bed appears eschar with none Serous exudate, Serosanguinous exudate   Malodor is not noticed  Wound edge appears non-attached  Buffy-wound skin appears intact and calloused  Images:      Left foot      Additional Data:     Labs:    Results from last 7 days   Lab Units 02/19/20  0520  02/17/20  1527   WBC Thousand/uL 9 41   < > 14 10*   HEMOGLOBIN g/dL 15 5   < > 18 1*   HEMATOCRIT % 47 7   < > 53 1*   PLATELETS Thousands/uL 170   < > 238   NEUTROS PCT %  --   --  66   LYMPHS PCT %  --   --  27   MONOS PCT %  --   --  6   EOS PCT %  --   --  1    < > = values in this interval not displayed  Results from last 7 days   Lab Units 02/19/20  0520  02/17/20  1527   POTASSIUM mmol/L 4 0   < > 3 5   CHLORIDE mmol/L 112*   < > 104   CO2 mmol/L 24   < > 29   BUN mg/dL 6   < > 12   CREATININE mg/dL 0 80   < > 0 96   CALCIUM mg/dL 8 5   < > 9 4   ALK PHOS U/L  --   --  70   ALT U/L  --   --  25   AST U/L  --   --  9    < > = values in this interval not displayed  Results from last 7 days   Lab Units 02/17/20  2233   INR  1 13       * I Have Reviewed All Lab Data Listed Above  * Additional Pertinent Lab Tests Reviewed:  All Labs Within Last 24 Hours Reviewed    Imaging:      Imaging Personally Reviewed by Myself Includes:  I have personally reviewed pertinent films in PACS    Recent Cultures (last 7 days):           Last 24 Hours Medication List:     Current Facility-Administered Medications:  acetaminophen 650 mg Oral Q6H PRN Blair Sidhu DO   aspirin 81 mg Oral Daily Brice Turner MD   atorvastatin 80 mg Oral QPM Brice Turner MD   clopidogrel 75 mg Oral Daily Brice Turner MD   HYDROmorphone 0 5 mg Intravenous Q3H PRN Blair Sidhu DO   metoprolol succinate 25 mg Oral Daily Brice Turner MD   oxyCODONE 10 mg Oral Q4H PRN Blair Sidhu DO   oxyCODONE 5 mg Oral Q4H PRN Blair Sidhu DO   ranolazine 500 mg Oral BID Brice Turner MD   topiramate 100 mg Oral BID Brice Turner MD        Today, Patient Was Seen By: Sahara Cabrera DPM    ** Please Note: This note has been constructed using a voice recognition system   **

## 2020-02-21 VITALS
RESPIRATION RATE: 18 BRPM | OXYGEN SATURATION: 98 % | HEIGHT: 70 IN | TEMPERATURE: 98 F | HEART RATE: 54 BPM | DIASTOLIC BLOOD PRESSURE: 62 MMHG | WEIGHT: 207.01 LBS | BODY MASS INDEX: 29.64 KG/M2 | SYSTOLIC BLOOD PRESSURE: 120 MMHG

## 2020-02-21 PROBLEM — Z95.5 S/P CORONARY ARTERY STENT PLACEMENT: Status: RESOLVED | Noted: 2019-12-29 | Resolved: 2020-02-21

## 2020-02-21 PROCEDURE — 99238 HOSP IP/OBS DSCHRG MGMT 30/<: CPT | Performed by: FAMILY MEDICINE

## 2020-02-21 PROCEDURE — 97163 PT EVAL HIGH COMPLEX 45 MIN: CPT

## 2020-02-21 PROCEDURE — 93926 LOWER EXTREMITY STUDY: CPT | Performed by: SURGERY

## 2020-02-21 PROCEDURE — 93922 UPR/L XTREMITY ART 2 LEVELS: CPT | Performed by: SURGERY

## 2020-02-21 PROCEDURE — 97167 OT EVAL HIGH COMPLEX 60 MIN: CPT

## 2020-02-21 RX ADMIN — RANOLAZINE 500 MG: 500 TABLET, FILM COATED, EXTENDED RELEASE ORAL at 09:01

## 2020-02-21 RX ADMIN — METOPROLOL SUCCINATE 25 MG: 25 TABLET, EXTENDED RELEASE ORAL at 09:00

## 2020-02-21 RX ADMIN — CLOPIDOGREL BISULFATE 75 MG: 75 TABLET ORAL at 09:01

## 2020-02-21 RX ADMIN — ASPIRIN 81 MG 81 MG: 81 TABLET ORAL at 09:00

## 2020-02-21 RX ADMIN — TOPIRAMATE 100 MG: 100 TABLET, FILM COATED ORAL at 09:01

## 2020-02-21 NOTE — DISCHARGE SUMMARY
Discharge Summary - Ange Guerrero 60 y o  male MRN: [id number]    Unit/Bed#: [facility] 806-01 Encounter: [id number]    Admission Date: 02/17/20  Admission Orders (From admission, onward)     Ordered        02/17/20 2058  Inpatient Admission  Once                   Discharge date: 02/21/2020    Admitting Diagnosis: Arterial occlusion [I70 90]    HPI: 59 yo M with a significant reported history of coronary artery disease and 30+ years of smoking cigarettes  Patient experienced progressive severe numbness and pain at rest of the left leg and foot for 3 weeks prior to presenting to the ED  Patient reports "chewing up a lot of Advil" to manage the pain at home  Procedures Performed: No orders of the defined types were placed in this encounter  Summary of Hospital Course:   Patient presented on 2/17/20 with 3 week history of subacute severe persistent pain and numbness of the left leg and foot  He was previously treated with antibiotics for a presumed cellulitis without resolution  Patient was referred to the ED at [address] [place] [address] from outpatient care for evaluation of a presumed DVT  In the ED, bedside doppler did not  any signals in the left lower extremity or foot  Patient was started on a heparin drip and began a course of pain medications; he was then transferred to [facility]  Upon arrival, patient's vitals were stable and he denied fever, chills, chest pain, shortness of breath, nausea, vomiting, diarrhea or abdominal pain  Vascular surgery consult reported that CTA of lower extremities with runoff was consistent with SFA occlusion and reconstitution in the popliteal trunk  On 2/18/20, Vascular surgery IR performed an Agram with pharmacomechanical thrombectomy of SFA/Popliteal and TPT clot  Distal SFA/proxmial popliteal stenting was completed along with PTA of proximal TPT  Patient was placed on a cardiac diet, began course of ASA/Plavix/Statin and [email or web address]   Serial neurovascular checks showed gradual improvement in sensation and pain  Podiatry consult diagnosed patient with a left lateral foot eschar without signs of skin infection and peripheral artery disease  Podiatry performed debridement of the eschar and continued treatment with daily local wound care of Bentadine paint  X-ray of the left foot was negative for acute fracture, dislocation, soft tissue change or bony erosion consistent with osteomyelitis on 2/20/20  Care management and Physical therapy consult cleared patient for discharge and recommend that provider write a script for a walker to assist with ambulation  They recommend the patient receive home therapy care and discussed options with the patient on 2/21/20             Significant Findings, Care, Treatment and Services Provided:   Results from last 7 days   Lab Units 02/19/20 0520 02/18/20 0618 02/17/20 2233 02/17/20  1527   WBC Thousand/uL 9 41 7 66 10 46* 14 10*   HEMOGLOBIN g/dL 15 5 16 2 16 6 18 1*   HEMATOCRIT % 47 7 49 0 50 0* 53 1*   PLATELETS Thousands/uL 170 197 214 238   NEUTROS PCT %  --   --   --  66   MONOS PCT %  --   --   --  6     Results from last 7 days   Lab Units 02/19/20 0520 02/18/20 0618 02/17/20  1527   POTASSIUM mmol/L 4 0 3 4* 3 5   CHLORIDE mmol/L 112* 110* 104   CO2 mmol/L 24 27 29   BUN mg/dL 6 9 12   CREATININE mg/dL 0 80 0 70 0 96   CALCIUM mg/dL 8 5 8 9 9 4   ALK PHOS U/L  --   --  70   ALT U/L  --   --  25   AST U/L  --   --  9         No results found for: PHOS   Results from last 7 days   Lab Units 02/18/20 0618 02/17/20 2233 02/17/20  1527   INR   --  1 13 1 00   PTT seconds 51* 47* 26         0   Lab Value Date/Time    TROPONINI 7 08 (H) 12/28/2019 0543    TROPONINI 7 97 (H) 12/27/2019 1610    TROPONINI 5 01 (H) 12/27/2019 1250    TROPONINI 0 20 (H) 12/27/2019 0657    TROPONINI 4 38 (H) 11/06/2019 1021    TROPONINI 6 07 (H) 11/06/2019 0606    TROPONINI 5 49 (H) 11/06/2019 0124    TROPONINI 4 89 (H) 11/05/2019 6792 TROPONINI 1 15 (H) 11/05/2019 1943    TROPONINI 2 16 (H) 08/14/2019 0105    TROPONINI 1 53 (H) 08/13/2019 2309    TROPONINI 0 87 (H) 08/13/2019 2108    TROPONINI 0 21 (H) 02/04/3882 9743     Complications: None    Discharge Diagnosis:   Patient Active Problem List   Diagnosis    Coronary artery disease involving native coronary artery of native heart with unstable angina pectoris ([facility] )    B[facility] disorder ([facility])    Tobacco user    Type 2 myocardial infarction without ST elevation ([facility])    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[facility])    Acute ST elevation myocardial infarction (STEMI) involving left anterior descending (LAD) coronary artery ([facility])    [facility] disease) [facility]    Ischemic ulcer of toe of left foot, with unspecified severity ([facility] )    Arterial occlusion     Resolved Problems  Date Reviewed: 2/19/2020          Resolved    S/P coronary artery stent placement 2/21/2020     Resolved by  Cecily Najjar, MD          Condition at Discharge: stable         Discharge instructions/Information to patient and family:   See after visit summary for information provided to patient and family  Provisions for Follow-Up Care:  See after visit summary for information related to follow-up care and any pertinent home health orders  Tanner Brian DPM, Podiatry wound care  Dr Stacey Berumen, the Vascular center     PCP: Trish Teresa DO    Disposition: Home    Planned Readmission: No      Discharge Statement   I spent 20 minutes discharging the patient  This time was spent on the day of discharge  I had direct contact with the patient on the day of discharge  Additional documentation is required if more than 30 minutes were spent on discharge  Discharge Medications:  See after visit summary for reconciled discharge medications provided to patient and family

## 2020-02-21 NOTE — PLAN OF CARE
Problem: OCCUPATIONAL THERAPY ADULT  Goal: Performs self-care activities at highest level of function for planned discharge setting  See evaluation for individualized goals  Description  Treatment Interventions: ADL retraining, Functional transfer training, Endurance training, Patient/family training, Compensatory technique education, Equipment evaluation/education, Activityengagement  Equipment Recommended: (pt with no DME needs has Shower chair at baseline)       See flowsheet documentation for full assessment, interventions and recommendations  2/21/2020 1334 by Alex Castillo OT  Note:   Limitation: Decreased ADL status, Decreased Safe judgement during ADL, Decreased endurance, Decreased self-care trans, Decreased high-level ADLs  Prognosis: [address]    Pt is a 60 y o  male who was admitted to [facility] on 2/17/2020 with Arterial occlusion , bipolar disorder, tobacco user, s/p coronary artery stent placement  Pt's problem list also includes PMH of type 2 MI, COPD, CAD  At baseline pt was completing I ADL's (except sock donning/doffing), I ADL's no AD with functional ambulation  Pt lives alone in a a 5th floor apartment with 0STE  Currently pt requires S/set-up UB , min a LB for overall ADLS and S bed mobility, min a without AD  for functional mobility/transfers min unsteady  Pt currently presents with impairments in the following categories -steps to enter environment, limited home support, difficulty performing ADLS and difficulty performing IADL   These impairments, as well as pt's fatigue, pain, impulsivity, decreased caregiver support and risk for falls  limit pt's ability to safely engage in all baseline areas of occupation, includingbathing, dressing, toileting, functional mobility/transfers, community mobility, laundry , house maintenance, medication management, meal prep and cleaning From OT standpoint, recommend home OT  upon D/C   OT will continue to follow to address the below stated goals       OT Discharge Recommendation: Home with family support  And home OT  OT - OK to Discharge: Yes    2

## 2020-02-21 NOTE — OCCUPATIONAL THERAPY NOTE
Occupational Therapy Evaluation     Patient Name: Rodo Louise  GWANTI'U Date: 2/21/2020  Problem List  Principal Problem:    Arterial occlusion  Active Problems:    Bipolar 1 disorder ([facility] )    Tobacco user    S/P coronary artery stent placement    Past Medical History  Past Medical History:   Diagnosis Date    Bipolar 1 disorder ([facility] )     CAD (coronary artery disease)     History of ventricular fibrillation 12/27/2019    in setting in-stent thrombosis; received defibrillation with 200J x4; received less than 30s chest compressions before ROSC achieved    Schizo-affective psychosis ([facility] )     Tobacco user      Past Surgical History  Past Surgical History:   Procedure Laterality Date    BACK SURGERY      CARDIAC SURGERY      stent placement    CHOLECYSTECTOMY      IR LOWER EXTREMITY / INTERVENTION  2/18/2020    KNEE SURGERY Right              02/21/20 0825   Note Type   Note type Eval only   Restrictions/Precautions   Weight Bearing Precautions Per Order No   Other Precautions Cognitive; Chair Alarm; Bed Alarm; Fall Risk;Pain;Telemetry; Impulsive   Pain Assessment   Pain Assessment 0-10   Pain Score 3   Pain Type Acute pain   Pain Location Foot   Pain Orientation Left   Hospital Pain Intervention(s) Repositioned; Ambulation/increased activity; Elevated; Emotional support; Rest   Home Living   Type of [address]  (5th floor apartment with elevator access and 0STE)   Home Layout Able to live on main level with bedroom/bathroom; Performs ADLs on one level;Elevator   Bathroom Shower/Tub Walk-in shower   Bathroom Toilet Standard   Bathroom Equipment Grab bars in shower; Shower chair   Bathroom Accessibility Accessible   Prior Function   Level of [place] Independent with ADLs and functional mobility   Lives With Alone   Receives Help From Friend(s)   ADL Assistance Independent   IADLs Independent   Falls in the last 6 months 0   Vocational On disability   Lifestyle   Autonomy I ADL's (except socks, Signifcant other assists when visits), I IADL's- per pt- requently orders take out dinners, does not drive +public transportation buses/uber  Reciprocal Relationships signficant other-works during the day, no other support system per pt  Service to Others on disability   Intrinsic Gratification TV   Psychosocial   Psychosocial (WDL) WDL   ADL   Where Assessed Edge of bed   Eating Assistance 7  Independent   Grooming Assistance 7  Independent   UB Bathing Assistance 5  Supervision/Setup   UB Bathing Deficit Setup   LB Bathing Assistance 4  Minimal Assistance   UB Dressing Assistance 5  Supervision/Setup   UB Dressing Deficit Setup   LB Dressing Assistance 4  Minimal Assistance   LB Dressing Deficit   (signficant other assistance with socks at home, requires extra time with LB dressing at baseline per pt report )   [address]  5  Supervision/Setup   Toileting Deficit Setup;Use of bedpan/urinal setup   Bed Mobility   Supine to Sit 5  Supervision   Additional items Assist x 1   Sit to Supine 5  Supervision   Additional items Assist x 1   Transfers   Sit to Stand 4  Minimal assistance   Additional items Assist x 1;Verbal cues; Impulsive   Stand to Sit 5  Supervision   Additional items Assist x 1   Functional Mobility   Functional Mobility 4  Minimal assistance   Additional Comments min a with forward lean, short steps 2* to left foot pain without RW  (trial RW next session if pt agreeable)   Balance   Static Sitting Good   Dynamic Sitting Good   Static Standing Fair +   Dynamic Standing Fair -   Ambulatory Fair -   Activity Tolerance   Activity Tolerance Patient limited by fatigue;Patient limited by pain   Medical Staff Made Aware RN, PT   Nurse Made Aware RN cleared pt for therapy   RUE Assessment   RUE Assessment WFL   LUE Assessment   LUE Assessment WFL   Hand Function   Gross Motor Coordination Functional   Fine Motor Coordination Functional   Vision-Basic Assessment   Current Vision Wears glasses only for reading   Vision - Complex Assessment   Acuity Able to read clock/calendar on wall without difficulty   Cognition   Arousal/Participation Alert; Cooperative   Attention Attends with cues to redirect   Orientation Level Oriented X4   Memory Within functional limits   Following Commands Follows one step commands with increased time or repetition   Comments Impulsive with decreased safety awareness/judgement (supsect baseline)   Assessment   Limitation Decreased ADL status; Decreased Safe judgement during ADL;Decreased endurance;Decreased self-care trans;Decreased high-level ADLs   Prognosis Fair   Goals   Patient Goals go home   LTG Time Frame 10-14   Long Term Goal #1 see goals below   Plan   Treatment Interventions ADL retraining;Functional transfer training; Endurance training;Patient/family training; Compensatory technique education;Equipment evaluation/education; Activityengagement   Goal Expiration Date 03/06/20   OT Frequency 3-5x/wk   Recommendation   OT Discharge Recommendation Home with family support and home OT   Equipment Recommended   (pt with no DME needs has Shower chair at baseline)   OT - OK to Discharge Yes   Barthel Index   Feeding 10   Bathing 0   Grooming Score 5   Dressing Score 5   Bladder Score 10   Bowels Score 10   Toilet Use Score 10   Transfers (Bed/Chair) Score 10   Mobility (Level Surface) Score 0   Stairs Score 5   Barthel Index Score 65   Modified [place] Scale   Modified [place] Scale 4   OT assessment:    Pt is a 60 y o  male who was admitted to [facility] on 2/17/2020 with Arterial occlusion , bipolar disorder, tobacco user, s/p coronary artery stent placement  Pt's problem list also includes PMH of type 2 MI, COPD, CAD  At baseline pt was completing I ADL's (except sock donning/doffing), I ADL's no AD with functional ambulation  Pt lives alone in a a 5th floor apartment with 0STE   Currently pt requires S/set-up UB , min a LB for overall ADLS and S bed mobility, min a without AD  for functional mobility/transfers min unsteady  Pt currently presents with impairments in the following categories -steps to enter environment, limited home support, difficulty performing ADLS and difficulty performing IADL   These impairments, as well as pt's fatigue, pain, impulsivity, decreased caregiver support and risk for falls  limit pt's ability to safely engage in all baseline areas of occupation, includingbathing, dressing, toileting, functional mobility/transfers, community mobility, laundry , house maintenance, medication management, meal prep and cleaning From OT standpoint, recommend home OT  upon D/C  OT will continue to follow to address the below stated goals  Occupational Therapy Goals:    *Mod I with bed mobility to engage in functional tasks  *Mod I Adl's after setup with use of AE PRN  *Mod I toileting and clothing management   *Mod I functional mobility and transfers to/from all surfaces with Fair + dynamic balance and safety for participation in dynamic adls and iadl tasks   *Demonstrate good carryover with safe use of RW during functional tasks    *Increase activity tolerance to 25-30 minutes for participation in adls and enjoyable activities  *Mod I with Simulated IADL management task      John Paul Ha MOT, OTR/L

## 2020-02-21 NOTE — PROGRESS NOTES
Pt discharged  D/c instructions reviewed and understood  Pt needs a ride home  Spoke with ROLAND WATKINS to arrange a ride for pt

## 2020-02-21 NOTE — PHYSICAL THERAPY NOTE
Physical Therapy Evaluation     Patient's Name: Anna Guerrero    Admitting Diagnosis  Arterial occlusion [I70 90]    Problem List  Patient Active Problem List   Diagnosis    Coronary artery disease involving native coronary artery of native heart with unstable angina pectoris ([facility] [place] [facility] )    B[facility]    Tobacco user    Type 2 myocardial infarction without ST elevation ([facility] [place] [facility] )    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[facility] )    Acute ST elevation myocardial infarction (STEMI) involving left anterior descending (LAD) coronary artery ([facility] [place] [facility] )    COPD (chronic obstructive pulmonary disease) ([facility] )    Ischemic ulcer of toe of left foot, with unspecified severity ([facility] )    Arterial occlusion       Past Medical History  Past Medical History:   Diagnosis Date    B[facility] disorder ([facility] )     CAD (coronary artery disease)     History of ventricular fibrillation 12/27/2019    in setting in-stent thrombosis; received defibrillation with 200J x4; received less than 30s chest compressions before ROSC achieved    Schizo-affective psychosis ([facility] )     Tobacco user        Past Surgical History  Past Surgical History:   Procedure Laterality Date    BACK SURGERY      CARDIAC SURGERY      stent placement    CHOLECYSTECTOMY      IR LOWER EXTREMITY / INTERVENTION  2/18/2020    KNEE SURGERY Right           02/21/20 1121   Note Type   Note type Eval/Treat   Pain Assessment   Pain Assessment 0-10   Pain Score 3   Pain Type Acute pain   Pain Location Foot   Pain Orientation Left   Hospital Pain Intervention(s) Repositioned; Ambulation/increased activity   Response to Interventions   (Tolerated)   Home Living   Type of Home Apartment  (5th floor w/elevator access)   Home Layout One level;Performs ADLs on one level;Elevator   Home Equipment Other (Comment)  (Denies DME)   Prior Function   Level of [place] Independent with ADLs and functional mobility   Lives With Alone   ADL Assistance Independent   IADLs Independent   Falls in the last 6 months 0   Vocational On disability   Restrictions/Precautions   Weight Bearing Precautions Per Order No   Other Precautions Impulsive;Cognitive; Fall Risk;Pain   General   Additional Pertinent History CAD, smoker, MI (2019, stent placement) Bipolar   Family/Caregiver Present No   Cognition   Overall Cognitive Status WFL   Arousal/Participation Lethargic   Following Commands Follows one step commands with increased time or repetition   RUE Assessment   RUE Assessment WFL   LUE Assessment   LUE Assessment WFL   RLE Assessment   RLE Assessment WFL  (Grossly 4-/5 w/movement)   LLE Assessment   LLE Assessment WFL  (Grossly 4-/5 w/movement)   Coordination   Movements are Fluid and Coordinated 1   Bed Mobility   Supine to Sit 5  Supervision   Sit to Supine Unable to assess   Additional Comments Pt received R side-lying in bed, HOB elevated, no bed alarm, no SCD's, asleep/drowsy, but rousable with continued verbal stimuli   Transfers   Sit to Stand 4  Minimal assistance   Additional items Assist x 1; Increased time required; Impulsive   Other 5  Supervision  (Ryofo-no-guxl-lying (R) transfer)   Additional items Impulsive  (Pt abandoned RW 2' from EOB and proceeded to "dive" into bed)   Additional Comments Pt resisted attempts to actively participate in proper stand-to-sit xfer w/RW; demonstration given by SPT afterwards  Ambulation/Elevation   Gait pattern Decreased L stance;Shuffling; Foward flexed; Short stride   Gait Assistance 5  Supervision   Additional items Assist x 1;Verbal cues; Other (Comment)  (Impulsive; Poor quality w/o AD; VC to remind pt to keep EO)   Assistive Device Rolling walker   Distance 80' x2   Stair Management Assistance Not tested   Balance   Static Sitting Good   Dynamic Sitting Good   Static Standing Fair +   Dynamic Standing Fair -   Ambulatory Fair -   Endurance Deficit   Endurance Deficit Yes   Endurance Deficit Description Pain L foot   Activity Tolerance Activity Tolerance Patient limited by pain   Medical Staff Made Aware   (CM made aware of d/c reccomendations)   Nurse Made Aware   (Pt returned as found, call bell within reach )   Assessment   Prognosis Fair   Problem List Decreased endurance; Impaired balance;Decreased mobility; Decreased coordination;Decreased cognition; Impaired judgement;Decreased safety awareness;Decreased skin integrity;Pain   Assessment Patient is a 68-year-old male who transferred to [facility] ED from [facility] ED on 2 17 2020 for evaluation of L foot pain x3 weeks, previously treated w/ABX for suspected cellulitis and poorly healing ulcer of L foot  Imaging revealed 2 vascular occlusions of the LLE, treated w/ pharmacomechanical thrombectomy on 2 18 2020  Pt also has a wound on L lateral foot, debrided and managed by podiatry  PMHx includes CAD, smoker, MI (2019, stent placement), and Bipolar disorder  Pt lives alone in a 5th floor apartment w/elevator access and 0 ISH  He drives, but currently takes public transportation/ride shares  PTA, pt reports I w/ADLs, IADLs, and functional mobility; he also reports difficulty donning/doffing socks ("I can't bend over w/my  Back pain")  PT orders received for evaluate and treat, w/Out of Bed As Tolerated orders for mobility  PT evaluated bedside, where pt was received lethargic, but rousable w/persistent verbal stimuli  Pt presented w/decreased endurance, impaired balance, decreased mobility, decreased coordination, decreased cognition, impaired judgment, decreased safety awareness, and 3/10 pain located L lateral foot (worse w/WB)  Pt completed all bed mobility Supervision and maintained sitting balance EOB w/o A, but required A to don slipper socks d/t back pain  He transferred sit-to-stand Alexia x1 for balance and safety    Pt was impulsive with gait initiation, and ambulated the first 15' supervision w/o AD and w/EC; pt then given a RW for support, after which gait quality improved (more upright posture, decreased variability) and pt was able to maintain EO with minimal VC, still supervision  Pt self-limited ambulation assessment d/t c/o increasing L foot pain  Upon return to room, pt abandoned RW 2' from EOB and proceeded to "dive" back into bed, arms outstretched, into R side-lying position  Education provided re: use of RW to unweight LLE, proper technique for sit-to/from-stand transfers w/RW  Pt would benefit from additional skilled care, but is likely to be d/c today  Recommend d/c to home w/home PT, and use of RW for ambulation       Barriers to Discharge Decreased caregiver support   Goals   Patient Goals Goals would have been made, but pt expected to d/c today   Recommendation   Recommendation Home PT   Equipment Recommended Walker  (RW)   PT - OK to Discharge Yes   Additional Comments When medically stable   Barthel Index   Feeding 10   Bathing 5   Grooming Score 5   Dressing Score 5   Bladder Score 10   Bowels Score 10   Toilet Use Score 10   Transfers (Bed/Chair) Score 10   Mobility (Level Surface) Score 10   Stairs Score 0   Barthel Index Score 75         Ana Paula Bond, SPT

## 2020-02-21 NOTE — PROGRESS NOTES
Progress Note - Liya Guerrero 60 y o  male MRN: [id number]    Unit/Bed#: [facility] 806-01 Encounter: [id number]      Assessment & Plan:      Arterial Occlusion:  Per Vascular surgery:  - s/p IR LLE Agram w/ pharmacomechanical thrombectomy of SFA/ popliteal & TPT clot  - Distal SFA/ proximal popliteal stenting  PTA of proximal TPT with improvement in pain in feet  - Continue serial neurovascular checks  - Cardiac diet  - ASA/plavix/statin  - [email or web address] - can discontinue as long as urine output is adequate     Patient presents with 3 week history of left leg and foot pain and chronic unhealing ulcer  Previously treated with antibiotics for a presumed cellulitis without resolution  CTA lower extremity (2/17) subacute or chronic occlusion of P1 and P2 segments with reconstitution at P3 segment via collaterals  Bilateral tibial peroneal disease as described above  Moderate focial stenosis at the junction of the right SFA-popliteal artery/adductor canal        Left lateral foot eschar without signs of infection;  Peripheral artery disease:  Per podiatry:   - continue local wound care with Bentadine paint open to air with application of Bentadine daily   - Radiographs negative for acute fracture or dislocation; negative for signs of soft tissue emyphysema or bony erosion consistent with osteomyelitis; No lytic or blastic lesions seen, will defer further imaging at this time  - No surgical intervention planned at this time  - weight-bearing as tolerated bilaterally   - still pending post agram LEADs final read by vascular team  - stable for discharge         S/P coronary artery stent placement:  - stent placed in 12/2019      Bipolar 1 disorder ([facility] [address] )  - stable  - continue PTA Topiramate      Tobacco user:  - current daily smoker, 1 5 PPD  - patient refused nicotine patch  -  on smoking cessation         Subjective:   Patient reports sleeping well and denies any new changes or symptoms     Overnight: patient reports no acute events overnight  Patient denies any fever, headache, dizziness, SOB, chest pain and NVD  Patient is able to ambulate independently and is voiding normally  Patient reports he has not been seen by PT/OT yet and is requesting to be discharged soon  Sensation and pain in left leg and foot continues to improve, today patient rates his pain at 5/10  Objective:     Vitals: Blood pressure 121/64, pulse (!) 51, temperature 97 6 °F (36 4 °C), temperature source Oral, resp  rate 18, height 5' 10" (1 778 m), weight 93 9 kg (207 lb 0 2 oz), SpO2 99 %  ,Body mass index is 29 7 kg/m²  Intake/Output Summary (Last 24 hours) at 2/21/2020 7556  Last data filed at 2/20/2020 1852  Gross per 24 hour   Intake 980 ml   Output 0 ml   Net 980 ml       Physical Exam:   General: patient is alert, lying comfortably in bed and in no acute distress  HEENT: normocephalic, atraumatic, pupils equal and reactive to light, no lymphadenopathy    Cardio: Normal S1/S2, regular rate and rhythm  No murmurs, rubs or gallops  Respiratory: normal respiratory effort, lungs clear bilaterally, no wheezing, rales or rhonchi   Abdmoinal: soft, non-tender, non-distended, normal active bowel sounds  Extremities: No gross LE edema, normal bulk and tone, patient able to ambulate independently   Skin: warm, dry, positive for poor hygiene, multiple scattered healing scars on left leg, poorly healing eschar on the left lateral foot       Invasive Devices     Peripheral Intravenous Line            Peripheral IV 02/19/20 Right;Upper Arm 1 day                Lab, Imaging and other studies: {Results Review Statement:16518}  VTE Pharmacologic Prophylaxis: {Pharmacologic VTE Prophylaxis:373642418}  VTE Mechanical Prophylaxis: {Mechanical VTE Prophylaxis:61910}

## 2020-02-21 NOTE — SOCIAL WORK
[facility] arranged to transport patient home  Pt denies he has money for transport and he has no one to pick him up    Cm notified IRAM Perdue to have patient at the ED entrance at 4 pm

## 2020-02-22 RX ORDER — LACTOSE-REDUCED FOOD
3 LIQUID (ML) ORAL 3 TIMES DAILY
Qty: 30 BOTTLE | Refills: 2 | Status: SHIPPED | OUTPATIENT
Start: 2020-02-22 | End: 2020-02-28 | Stop reason: SDUPTHER

## 2020-02-23 NOTE — SOCIAL WORK
Received call from Pt Saturday regarding needing information on follow up care  CM advise information on discharge instructions and provided phone number for [facility] and provider office information  Pt aware that may not be able to reach provider until Monday  Pt contacted hospital again on Sunday requesting same information  Information provided

## 2020-02-24 ENCOUNTER — TELEPHONE (OUTPATIENT)
Dept: FAMILY MEDICINE CLINIC | Facility: CLINIC | Age: 65
End: 2020-02-24

## 2020-02-24 NOTE — UTILIZATION REVIEW
Notification of Inpatient Admission/Inpatient Authorization Request   This is a Notification of Inpatient Admission for 5 Kaitlynn Arita  Be advised that this patient was admitted to our facility under Inpatient Status  Contact Yajaira Vallejo at [phone] for additional admission information  Yamilex Bonilla  DEPT  DEDICATED -[phone]  Patient Name:   Sanya Guerrero   YOB: 1955       [address]   [address]:   [address]   Tax ID: [id number]  NPI: [id number] Attending Provider/NPI: Lamar Currie Md [[id number]]   Place of Service Code: 24     Place of Service Name:  [address] [place] [address]   Start Date: 2/17/20 2027     Discharge Date & Time: 2/21/2020  6:52 PM    Type of Admission: Inpatient Status Discharge Disposition (if discharged): Home with [address] [facility]   Patient Diagnoses: Arterial occlusion [I70 90]     Orders: Admission Orders (From admission, onward)     Ordered        02/17/20 2058  Inpatient Admission  Once                    Assigned Utilization Review Contact: Yajaira Vallejo  Utilization   Network Utilization Review Department  Phone: [phone]; Fax [phone]  Email: [email or web address]   ATTENTION PAYERS: Please call the assigned Utilization  directly with any questions or concerns ALL voicemails in the department are confidential  Send all requests for admission clinical reviews, approved or denied determinations and any other requests to dedicated fax number belonging to the campus where the patient is receiving treatment    Initial Clinical Review           Admission: Date/Time/Statement: Admission Orders (From admission, onward)                   Ordered           02/17/20 2058   Inpatient Admission  Once                                      Orders Placed This Encounter   Procedures    Inpatient Admission       Standing Status:   Standing       Number of Occurrences:   1       Order Specific Question:   Admitting Physician       Answer:   GABRIELLE HANNA [[id number]]       Order Specific Question:   Level of Care       Answer:   Med Surg [16]       Order Specific Question:   Estimated length of stay       Answer:   More than 2 Midnights       Order Specific Question:   Certification       Answer:   I certify that inpatient services are medically necessary for this patient for a duration of greater than two midnights  See H&P and MD Progress Notes for additional information about the patient's course of treatment           Assessment/Plan: 64 yr old male to the [facility] directly as a transfer from [address] [place] [address] where he presented to the ed with pain in his left foot for the past 3 weeks  He was previously being treated for cellulitis with no relief or change and is significantly worse and constant  The doppler at [place] could not  and signal   He was placed and a heparin drip and transferred to [facility]  The plan is cta of lower extremity which showed subacute or chronic occlusion of left p1 and p2 segments with reconstitution at p2  Npo after mn continue heparin drip, ir consult for possible lysis in am , continue serial neurovascular checks  [facility] podiatry for pa ad left foot gangrene    He is admitted as an inpatient with arterial occlusion                                                                                                        ED Triage Vitals   Temperature Pulse Respirations Blood Pressure SpO2   02/18/20 0005 02/18/20 0005 02/18/20 0005 02/18/20 0005 02/18/20 0005   98 2 °F (36 8 °C) 87 18 126/67 94 %       Temp Source Heart Rate Source Patient Position - Orthostatic VS BP Location FiO2 (%)   02/18/20 0005 -- -- -- --   Oral               Pain Score           02/17/20 2308           6                   Wt Readings from Last 1 Encounters:    02/17/20 93 9 kg (207 lb 0 2 oz)       Additional Vital Signs:   /18/20 1142   --   46Abnormal    15   116/74   91   97 %   --   02/18/20 1137   --   44Abnormal    14   115/70   89   98 %   --   02/18/20 1132   --   48Abnormal    13   118/70   89   100 %   --   02/18/20 1127   --   48Abnormal    15   119/71   91   99 %   --   02/18/20 1122   --   45Abnormal    15   111/66   83   99 %   --   02/18/20 1117   --   46Abnormal    14   115/70   87   99 %   --   02/18/20 1112   --   51Abnormal    14   117/70   90   99 %   --   02/18/20 1107   --   46Abnormal    14   113/65   85   99 %   --   02/18/20 1102   --   48Abnormal    14   107/65   81   99 %   --   02/18/20 1057   --   54Abnormal    15   116/79   92   98 %   --   02/18/20 10:54:59   --   --   --   --   --   97 %   Nasal cannula   02/18/20 1050   --   45Abnormal    16   116/63   --   99 %   --         Pertinent Labs/Diagnostic Test Results:               Results from last 7 days   Lab Units 02/18/20  0618 02/17/20  2233 02/17/20  1527   WBC Thousand/uL 7 66 10 46* 14 10*   HEMOGLOBIN g/dL 16 2 16 6 18 1*   HEMATOCRIT % 49 0 50 0* 53 1*   PLATELETS Thousands/uL 197 214 238   NEUTROS ABS Thousands/µL  --   --  9 32*               Results from last 7 days   Lab Units 02/18/20  0618 02/17/20  1527   SODIUM mmol/L 142 139   POTASSIUM mmol/L 3 4* 3 5   CHLORIDE mmol/L 110* 104   CO2 mmol/L 27 29   ANION GAP mmol/L 5 6   BUN mg/dL 9 12   CREATININE mg/dL 0 70 0 96   EGFR ml/min/1 73sq m 101 84   CALCIUM mg/dL 8 9 9 4              Results from last 7 days   Lab Units 02/17/20  1527   AST U/L 9   ALT U/L 25   ALK PHOS U/L 70   TOTAL PROTEIN g/dL 7 4   ALBUMIN g/dL 3 4*   TOTAL BILIRUBIN mg/dL 0 30                   Results from last 7 days   Lab Units 02/18/20  0618 02/17/20  1527   GLUCOSE RANDOM mg/dL 95 99                  Results from last 7 days   Lab Units 02/18/20  0618 02/17/20  2233 02/17/20  1527   PROTIME seconds  --  14 1 10 7   INR    --  1 13 1 00   PTT seconds 51* 47* 26   cta of lower extremity-Subacute or chronic occlusion of left P1 and P2 segments with reconstitution at P3 segment via collaterals        Bilateral tibial peroneal disease as described above        Moderate focal stenosis at the junction of the right SFA-popliteal artery/adductor canal -  Vas lower limb--leftLEFT LOWER LIMB:  Evaluation shows acute occlusion of distal superficial femoral artery and  popliteal artery  Popliteal artery is ectatic measuring 1 0 cm  Ankle/Brachial Index: 0 45, which is ischemic range  Prior 1 0 (11/7/2019)  PVR/ PPG tracings are severely dampened  Metatarsal pressure of 45 mmHg  Great toe pressure of 0 mmHg, flat lined      ekg-Normal sinus rhythm  Possible Left atrial enlargement  Low voltage QRS  Borderline ECG        lle arteriogram--arteriography showed acute occlusion of distal SFA extending into P2 segment, with tandem acute occlusion of proximal tibioperoneal trunk      Pharmacomechanical thrombectomy of the SFA/pop clot as well as TP clot      Stenting of the distal SFA/proximal pop     POBA of proximal TP trunk      Starclose R groin   2 hours flat      Pt not anticipated to return to IR tomorrow   Done under conscius sedation  Medical History        Past Medical History:   Diagnosis Date    Bipolar 1 disorder ([facility] )      CAD (coronary artery disease)      History of ventricular fibrillation 12/27/2019     in setting in-stent thrombosis; received defibrillation with 200J x4; received less than 30s chest compressions before ROSC achieved    Schizo-affective psychosis ([facility] )      Tobacco user           Present on Admission:   Arterial occlusion   Bipolar 1 disorder ([facility] )   Tobacco user        Admitting Diagnosis: Arterial occlusion [I70 90]  Age/Sex: 64 y  o  male  Admission Orders:  Scheduled Medications:     Medications:  aspirin 81 mg Oral Daily   atorvastatin 80 mg Oral QPM   clopidogrel 75 mg Oral Daily   metoprolol succinate 25 mg Oral Daily   ranolazine 500 mg Oral BID   topiramate 100 mg Oral BID    Continuous IV Infusions:     heparin (porcine) 3-30 Units/kg/hr (Order-Specific) Intravenous Titrated   sodium chloride 125 mL/hr Intravenous Continuous      PRN Meds:     acetaminophen 650 mg Oral Q6H PRN   fentanyl citrate (PF)   Intravenous Code/Trauma/Sedation Med   heparin (porcine)     Code/Trauma/Sedation Med   HYDROmorphone 0 5 mg Intravenous Q3H PRN   lidocaine 1% buffered     Code/Trauma/Sedation Med   midazolam     Code/Trauma/Sedation Med   oxyCODONE 10 mg Oral Q4H PRN   oxyCODONE 5 mg Oral Q4H PRN      IR abdominal angiography and intervention  IP CONSULT TO VASCULAR SURGERY  IP CONSULT TO PODIATRY     Network Utilization Review Department  [email or web address]  ATTENTION: Please call with any questions or concerns to [phone] and carefully listen to the prompts so that you are directed to the right person  All voicemails are confidential   Елена Basil all requests for admission clinical reviews, approved or denied determinations and any other requests to dedicated fax number below belonging to the campus where the patient is receiving treatment   List of dedicated fax numbers for the Facilities:  [address] [place] [address] DENIALS (Administrative/Medical Necessity) [phone]   [address] [place] [address] (Maternity/NICU/Pediatrics) [phone]   Jessica Ramos [phone]   Ai Castillo [phone]   Justyna Curry [phone]   [address] [facility] [address] [facility] [address] [phone]

## 2020-02-24 NOTE — TELEPHONE ENCOUNTER
Patient called requesting an order for Ensure to be sent to his insurance so he can build his heart muscle mass  Patient states he needs to speak with Dr Meena Frazier today  Patient can be reached at [phone]  Please advise  Thank you

## 2020-02-24 NOTE — PROGRESS NOTES
Assessment/Plan:    Patient sent to ER for further evaluation and possible vascular intervention  Diagnoses and all orders for this visit:    Ischemic ulcer of toe of left foot, with unspecified severity ([facility] [address] )          Subjective:      Patient ID: Jorgito Kumar is a 60 y o  male  60year old male with CAD and extensive smoking history who presents with worsening left lower extremity pain  Patient was seen previously, placed on Kephlex for presumes cellulitis with no improvement in symptoms  Patient reports persistent pain and numbness of the foot and leg  No fevers or chills  The following portions of the patient's history were reviewed and updated as appropriate: allergies, current medications, past family history, past medical history, past social history, past surgical history and problem list     Review of Systems   Constitutional: Negative for activity change, chills, fatigue and fever  HENT: Negative for congestion, rhinorrhea and sore throat  Eyes: Negative for visual disturbance  Respiratory: Negative for cough, shortness of breath and wheezing  Cardiovascular: Negative for chest pain, palpitations and leg swelling  Gastrointestinal: Negative for abdominal pain, diarrhea, nausea and vomiting  Genitourinary: Negative for dysuria  Musculoskeletal: Negative for arthralgias  Skin: Negative for rash and wound  Neurological: Negative for dizziness, weakness and light-headedness  Psychiatric/Behavioral: Negative for suicidal ideas  Objective:      /70   Pulse 98   Temp 98 9 °F (37 2 °C)   Ht 5' 10" (1 778 m)   Wt 93 9 kg (207 lb)   SpO2 98%   BMI 29 70 kg/m²          Physical Exam   Constitutional: He is oriented to person, place, and time  He appears well-developed and well-nourished  No distress  HENT:   Head: Normocephalic and atraumatic  Eyes: Pupils are equal, round, and reactive to light   Conjunctivae are normal    Musculoskeletal:   Left lower extremity erythema and diminished temperature of the foot  No palpable pulses  Lateral distal 5th metatarsal ulcer present  Neurological: He is alert and oriented to person, place, and time  Skin: He is not diaphoretic

## 2020-02-25 ENCOUNTER — TELEPHONE (OUTPATIENT)
Dept: OTHER | Facility: HOSPITAL | Age: 65
End: 2020-02-25

## 2020-02-25 ENCOUNTER — TELEPHONE (OUTPATIENT)
Dept: VASCULAR SURGERY | Facility: CLINIC | Age: 65
End: 2020-02-25

## 2020-02-25 ENCOUNTER — PATIENT OUTREACH (OUTPATIENT)
Dept: CASE MANAGEMENT | Facility: OTHER | Age: 65
End: 2020-02-25

## 2020-02-25 ENCOUNTER — PATIENT OUTREACH (OUTPATIENT)
Dept: FAMILY MEDICINE CLINIC | Facility: CLINIC | Age: 65
End: 2020-02-25

## 2020-02-25 NOTE — TELEPHONE ENCOUNTER
Please allow adequate time to process this request, case management is currently working on the task

## 2020-02-25 NOTE — TELEPHONE ENCOUNTER
Called patient to see what his questions were about scripts at discharge but he did not answer  Called twice and line busy both times

## 2020-02-25 NOTE — PROGRESS NOTES
Patient called back  He states [address] did a very thorough assessment  He expects to see a  from them this week  He would like to call this OP CM daily to review home health visits, doctor's appointments, etc  He states he can order from [facility] & have groceries delivered at least once because he is getting a small income tax refund soon  Patient shared info regarding his personal relationships & goals  He states this is causing him additional stress & worry  Will continue to follow & follow up on resources  Will also follow up with the patient regarding pulmonology & cardiology

## 2020-02-25 NOTE — TELEPHONE ENCOUNTER
Called pt to let him know that I've arranged transportation for his 3/4 appt in [place]  I left a message asking the pt to return my call  The pt returned my call while I was typing this message and I let him know that Glenda Dawn will be picking him up on 3/4 for appt

## 2020-02-25 NOTE — SOCIAL WORK
CM was contacted by patient on 2/25  Patient reported that he would like resources for food pantry  Patient has Meals on Wheels but the meal portions are not enough for him  CM informed patient to call [employer] to report a need for larger portions  CM asked patient if he had e-mail address that CM could provide resource list  Patient reported e-mail as: [email or web address]  CM spoke to patient about OP CM  Patient would like someone to refer to  CM sent message to Ro Lopez via [employer]  CM sent e-mail to the patient confirming that request was sent for OP CM and resource list attached

## 2020-02-25 NOTE — PROGRESS NOTES
Contacted by [facility] FP embedded OP CM  She requests clarification for the patient's PCP regarding his Ensure request      Spoke to the patient  He states he has no appetite, he's lost weight, & his insurance company said he can get Ensure with a Rx stating it is medically necessary  He has no transportation & states he is having moderate to severe pain of his LLE post-op  He denies drainage, redness, or heat, but states there is some swelling  He denies fever, aches, chills, chest pain  He is not taking any pain meds at this time  He does not have any ice that he can use  He states he is currently in a difficult situation because his car is dead & not fixable, he is behind on his rent, he doesn't have money for food, but he also can't go anywhere to get food, & he has outstanding debts  He doesn't have a support system in the area  He does get meals on wheels  He states by April much of this will be resolved & he should have a new car too  [facility] contacted & they set up Ly[employer] for his 3/4 appointment  [facility] gave him a hospital f/u for this Friday 2/28/20 @ 1:30 PM & will also order him a Lyft  Patient is aware of the above & is also aware that this is not a permanent arrangement  Edmar Quinn will contact the patient regarding resources  This OP CM left a message for Che Brice at the [address] requesting an update on the [address] [place] [address] Avenue the patient back to review everything, but he was with DR RICHARD SHAHID [facility]  He will call back after they are done

## 2020-02-25 NOTE — PROGRESS NOTES
OP CM called to pt in regards to him stating to Allison that he does not have food  Pt states the nurse from [facility] is there now and she is going to put in a request for their  to evaluate pt  Pt is already getting Meals on Wheels and advised him to call and see if they can deliver extra meals  Pt also aware that [employer] delivers  Pt continues to get $2100 in disability  Pt is in low income housing and pays $600 a month  Pt states he will be here this Friday and [employer] is transporting him here

## 2020-02-26 ENCOUNTER — PATIENT OUTREACH (OUTPATIENT)
Dept: CASE MANAGEMENT | Facility: OTHER | Age: 65
End: 2020-02-26

## 2020-02-26 NOTE — PROGRESS NOTES
Left message for SAMANTA renee asking to follow up on the patient's hospital pay assist application  Phone number provided for call back

## 2020-02-27 ENCOUNTER — PATIENT OUTREACH (OUTPATIENT)
Dept: CASE MANAGEMENT | Facility: OTHER | Age: 65
End: 2020-02-27

## 2020-02-27 NOTE — PROGRESS NOTES
Called patient to remind him about his [place] appointment tomorrow afternoon  He states he's aware & his [employer] will be there at 12:45  He states he is very unhappy with some of the services from home health because they give him one day or time, but then change it  He said he called & told them if the dietician doesn't come tomorrow when she said she was, not to bother rescheduling  Empathized about the frustration over unexpected schedule changes, but encouraged him to meet with the dietician help manage/improve his health  He agrees that it would be valuable, but does not commit  Attempted to contact [facility]  Left a voicemail for new clinical coordinator, Garrison Hammond  Patient stated he also contacted inpatient case management about a rolling walker he was going to get in the hospital, but discharged prior to it arriving  He states he spoke to Sienna Johnston who told him she would have it delivered to his home  He wanted to make sure because he still didn't receive it & his visiting nurse said she would pick it up  In basket sent to Veena Hairston to clarify the arrangements

## 2020-02-28 ENCOUNTER — PATIENT OUTREACH (OUTPATIENT)
Dept: CASE MANAGEMENT | Facility: OTHER | Age: 65
End: 2020-02-28

## 2020-02-28 ENCOUNTER — OFFICE VISIT (OUTPATIENT)
Dept: FAMILY MEDICINE CLINIC | Facility: CLINIC | Age: 65
End: 2020-02-28
Payer: COMMERCIAL

## 2020-02-28 VITALS
BODY MASS INDEX: 29.02 KG/M2 | SYSTOLIC BLOOD PRESSURE: 118 MMHG | TEMPERATURE: 98.1 F | DIASTOLIC BLOOD PRESSURE: 78 MMHG | HEIGHT: 70 IN | RESPIRATION RATE: 18 BRPM | OXYGEN SATURATION: 96 % | WEIGHT: 202.7 LBS | HEART RATE: 100 BPM

## 2020-02-28 DIAGNOSIS — L97.529 ISCHEMIC ULCER OF TOE OF LEFT FOOT, WITH UNSPECIFIED SEVERITY (HCC): Primary | ICD-10-CM

## 2020-02-28 DIAGNOSIS — I70.90 ARTERIAL OCCLUSION: ICD-10-CM

## 2020-02-28 PROCEDURE — 1111F DSCHRG MED/CURRENT MED MERGE: CPT | Performed by: FAMILY MEDICINE

## 2020-02-28 PROCEDURE — 99213 OFFICE O/P EST LOW 20 MIN: CPT | Performed by: FAMILY MEDICINE

## 2020-02-28 PROCEDURE — 3008F BODY MASS INDEX DOCD: CPT | Performed by: FAMILY MEDICINE

## 2020-02-28 RX ORDER — LACTOSE-REDUCED FOOD
3 LIQUID (ML) ORAL 3 TIMES DAILY
Qty: 90 BOTTLE | Refills: 2 | Status: SHIPPED | OUTPATIENT
Start: 2020-02-28 | End: 2020-03-02 | Stop reason: SDUPTHER

## 2020-02-28 NOTE — PROGRESS NOTES
Assessment/Plan:    Lower extremity wound appears stable at this time, close follow-up with both vascular surgery and podiatry  Continue with ASA/lplaxix/Statin along with local wound care  Encourage smoking cessation, however patient declined  Diagnoses and all orders for this visit:    Ischemic ulcer of toe of left foot, with unspecified severity ([address] )  -     Nutritional Supplements (ENSURE NUTRITION SHAKE) LIQD; Take 3 Bottles by mouth 3 (three) times a day    Arterial occlusion  -     Nutritional Supplements (ENSURE NUTRITION SHAKE) LIQD; Take 3 Bottles by mouth 3 (three) times a day          Subjective:      Patient ID: Lulú Deshpande is a 60 y o  male  60year old male with CAD and 30+ years smoking history who presents following hospitalization on 2/17/2020 - 2/21/2020 for left SFA/popliteal and TPT thrombus  Patient was initially evaluated at [address] [place] [address] and transferred to [facility] with pharmacomechanical thrombectomy of SFA and proximal popliteal stenting  Left lateral foot non-healing ulcer was evaluated by podiatry who performed debriedment, and  recommended local wound care as there was no evidence of bone or soft tissue infection  Patient was placed on ASA/Plavix/Statin and discharged home in stable condition  Since discharge patient reports significant improvement in left lower extremity pain, and sensation is improving  Foot wound remains stable without discharge, pain, or swelling  Patient continues to smoke daily without intentions of quitting  Patient also requests Ensure drinks to "make his heart muscle stronger"  Denies fever, chills, chest pain, shortness of breath, or lower extremity edema       TCM Call (since 1/28/2020)     None      TCM Call (since 1/28/2020)     None          The following portions of the patient's history were reviewed and updated as appropriate: allergies, current medications, past family history, past medical history, past social history, past surgical history and problem list     Review of Systems   Constitutional: Negative for activity change, chills, fatigue and fever  HENT: Negative for congestion, rhinorrhea and sore throat  Eyes: Negative for visual disturbance  Respiratory: Negative for cough, shortness of breath and wheezing  Cardiovascular: Negative for chest pain, palpitations and leg swelling  Gastrointestinal: Negative for abdominal pain, diarrhea, nausea and vomiting  Genitourinary: Negative for dysuria  Musculoskeletal: Negative for arthralgias  Skin: Negative for rash and wound  Neurological: Negative for dizziness, weakness and light-headedness  Psychiatric/Behavioral: Negative for suicidal ideas  Objective:      /78 (BP Location: Left arm, Patient Position: Sitting, Cuff Size: Standard)   Pulse 100   Temp 98 1 °F (36 7 °C) (Tympanic)   Resp 18   Ht 5' 10" (1 778 m)   Wt 91 9 kg (202 lb 11 2 oz)   SpO2 96%   BMI 29 08 kg/m²          Physical Exam   Constitutional: He is oriented to person, place, and time  He appears well-developed and well-nourished  No distress  HENT:   Head: Normocephalic and atraumatic  Neck: No JVD present  Cardiovascular: Normal rate, regular rhythm, normal heart sounds and intact distal pulses  Exam reveals no gallop and no friction rub  No murmur heard  Pulmonary/Chest: Effort normal and breath sounds normal  No stridor  No respiratory distress  He has no wheezes  He has no rales  Abdominal: Soft  Bowel sounds are normal  He exhibits no distension  There is no tenderness  There is no guarding  Neurological: He is alert and oriented to person, place, and time  Skin: He is not diaphoretic  Unhealed left lateral ischemic ulcer present with eschar, no drainage, DP pulses palpated bilaterally    Psychiatric: He has a normal mood and affect   His behavior is normal

## 2020-02-28 NOTE — PROGRESS NOTES
Received a call from the patient  He was at his PCP appointment, but spoke to [facility] who said the Ensure Rx was incomplete  [employer]  They needed prior authorization from [facility], [phone] & completed the prior auth over the phone  They will review within 48-72 hours  Reference #[id number]  To follow up on the prior auth call, [phone]  Called patient back to make him aware  He states understanding  Will follow up next week

## 2020-03-02 ENCOUNTER — PATIENT OUTREACH (OUTPATIENT)
Dept: CASE MANAGEMENT | Facility: OTHER | Age: 65
End: 2020-03-02

## 2020-03-02 ENCOUNTER — TELEPHONE (OUTPATIENT)
Dept: FAMILY MEDICINE CLINIC | Facility: CLINIC | Age: 65
End: 2020-03-02

## 2020-03-02 DIAGNOSIS — L97.529 ISCHEMIC ULCER OF TOE OF LEFT FOOT, WITH UNSPECIFIED SEVERITY (HCC): ICD-10-CM

## 2020-03-02 DIAGNOSIS — I70.90 ARTERIAL OCCLUSION: ICD-10-CM

## 2020-03-02 DIAGNOSIS — R63.4 WEIGHT LOSS: Primary | ICD-10-CM

## 2020-03-02 DIAGNOSIS — R63.8 INADEQUATE ORAL INTAKE: ICD-10-CM

## 2020-03-02 NOTE — PROGRESS NOTES
Patient called back to say DR RICHARD SHAHID [facility] was there earlier  Their  also saw the patient & he states made some calls on his behalf  They were able to get him 12 free cases of Ensure until insurance approves his prescription  He states the prior Benita Meigs was denied  Spoke to Jonathan Chowdhury  They will send a new Rx to [facility]  Called the patient to make him aware  He states understanding

## 2020-03-02 NOTE — TELEPHONE ENCOUNTER
Received a call from [facility]  from outpatient care management requesting a new order for Ensure be sent to [facility]  New dx code needed (poor/inadequate diet) also had slight weight loss  Also the way the original rx is written it looks as though he takes 9 bottles a day  She is requesting it say 3 bottles daily

## 2020-03-02 NOTE — PROGRESS NOTES
Patient left a voicemail requesting a call back  Returned patient's call, no answer, left a voicemail with call back number

## 2020-03-03 ENCOUNTER — PATIENT OUTREACH (OUTPATIENT)
Dept: CASE MANAGEMENT | Facility: OTHER | Age: 65
End: 2020-03-03

## 2020-03-03 NOTE — PROGRESS NOTES
Patient called to say that he was experiencing increased pain to the plantar surface of his left foot with mild swelling  He states vascular also called & changed his appointment from tomorrow to next week  He states compliance with his medications  Advised him to call vascular regarding his symptoms & see if they can see him sooner  If they can't, [facility]  Patient stated understanding, the phone number for vascular was provided  Patient called back to say [facility] needs a prior auth for the Ensure & he still hasn't gotten his walker  Advised him that I would follow up with [facility], & inpatient again, tomorrow  Asked him what vascular said & he said he still hadn't called

## 2020-03-04 ENCOUNTER — TELEPHONE (OUTPATIENT)
Dept: VASCULAR SURGERY | Facility: CLINIC | Age: 65
End: 2020-03-04

## 2020-03-04 ENCOUNTER — PATIENT OUTREACH (OUTPATIENT)
Dept: CASE MANAGEMENT | Facility: OTHER | Age: 65
End: 2020-03-04

## 2020-03-04 RX ORDER — LACTOSE-REDUCED FOOD
1 LIQUID (ML) ORAL 3 TIMES DAILY
Qty: 90 BOTTLE | Refills: 2 | Status: SHIPPED | OUTPATIENT
Start: 2020-03-04 | End: 2020-04-02 | Stop reason: SDUPTHER

## 2020-03-04 NOTE — TELEPHONE ENCOUNTER
Received email from Hermes Albert that pt was receiving calls from [facility] about an appt today  Per Hermes Albert, today's appt was rescheduled to 3/10  Called [facility] and they didn't have the pt down as needing tranpsort today  I did arrange transportation for the pt 3/10 appt in [facility]  The pt was made aware of this

## 2020-03-04 NOTE — PROGRESS NOTES
Spoke to nurseRoz, at [facility] to make them aware the patient needs prior auth for his Ensure prescription  Noted in Epic that [facility] home health requested orders for DME  Spoke to the patient to update him on the Ensure  Made him aware [facility] in touch with [facility] for DME  He states he knows, they are going to order him a cane & something to sit on in the shower  He would rather have the walker he had inpatient & he knows he is more stable with a walker  Advised him to discuss that with his physical therapist today & that the walker he used inpatient was probably the hospital's  Emphasized that [facility] will be managing his DME for ambulation at this time  Made him aware that there are donated r/w available  He will talk to PT today  He still has not called vascular regarding his left foot pain which he said is improved after he took 2 tylenol  He states he will call them today

## 2020-03-05 ENCOUNTER — TRANSCRIBE ORDERS (OUTPATIENT)
Dept: VASCULAR SURGERY | Facility: CLINIC | Age: 65
End: 2020-03-05

## 2020-03-05 DIAGNOSIS — L97.529 ISCHEMIC ULCER OF TOE OF LEFT FOOT, WITH UNSPECIFIED SEVERITY (HCC): Primary | ICD-10-CM

## 2020-03-05 DIAGNOSIS — I74.5 EMBOLISM AND THROMBOSIS OF ILIAC ARTERY (HCC): ICD-10-CM

## 2020-03-06 ENCOUNTER — PATIENT OUTREACH (OUTPATIENT)
Dept: CASE MANAGEMENT | Facility: OTHER | Age: 65
End: 2020-03-06

## 2020-03-06 NOTE — PROGRESS NOTES
Received a call back from [facility] from the area of aging regarding the patient's application for the [facility] program  She spoke to [employer] with [facility] & he is on the list to be assessed by the state  However, it is a long list & they are behind  Will make patient aware

## 2020-03-09 ENCOUNTER — PATIENT OUTREACH (OUTPATIENT)
Dept: CASE MANAGEMENT | Facility: OTHER | Age: 65
End: 2020-03-09

## 2020-03-09 NOTE — PROGRESS NOTES
Called the patient to remind him of his vascular appointment  He states his Lyft will be there @ 10AM  His left foot is still very painful  He states he took Tylenol, but the pain persists  Advised him to rest & keep it elevated  He states he spoke to the home health physical therapist who agreed to order him a rolling walker  His PCP did the prior auth for his Ensure & he wants to know who to follow up with  Advised the pharmacy, but that it can take 48-72 hours from them to get a decision from the insurance company  He states understanding  He states he will call tomorrow afternoon with updates

## 2020-03-10 ENCOUNTER — PATIENT OUTREACH (OUTPATIENT)
Dept: CASE MANAGEMENT | Facility: OTHER | Age: 65
End: 2020-03-10

## 2020-03-10 ENCOUNTER — TELEPHONE (OUTPATIENT)
Dept: FAMILY MEDICINE CLINIC | Facility: CLINIC | Age: 65
End: 2020-03-10

## 2020-03-10 ENCOUNTER — OFFICE VISIT (OUTPATIENT)
Dept: VASCULAR SURGERY | Facility: CLINIC | Age: 65
End: 2020-03-10
Payer: COMMERCIAL

## 2020-03-10 VITALS
TEMPERATURE: 97.4 F | RESPIRATION RATE: 20 BRPM | WEIGHT: 207 LBS | BODY MASS INDEX: 29.63 KG/M2 | DIASTOLIC BLOOD PRESSURE: 74 MMHG | HEIGHT: 70 IN | HEART RATE: 80 BPM | SYSTOLIC BLOOD PRESSURE: 118 MMHG

## 2020-03-10 DIAGNOSIS — I74.5 EMBOLISM AND THROMBOSIS OF ILIAC ARTERY (HCC): ICD-10-CM

## 2020-03-10 DIAGNOSIS — L97.529 ISCHEMIC ULCER OF TOE OF LEFT FOOT, WITH UNSPECIFIED SEVERITY (HCC): Primary | ICD-10-CM

## 2020-03-10 PROCEDURE — 99215 OFFICE O/P EST HI 40 MIN: CPT | Performed by: SURGERY

## 2020-03-10 PROCEDURE — 1111F DSCHRG MED/CURRENT MED MERGE: CPT | Performed by: SURGERY

## 2020-03-10 PROCEDURE — 3008F BODY MASS INDEX DOCD: CPT | Performed by: SURGERY

## 2020-03-10 NOTE — PROGRESS NOTES
Assessment/Plan:    Ischemic ulcer of toe of left foot, with unspecified severity ([address] )  67yo unhygenic M with extensive smoking history, ongoing, 1PPD, CAD, with STEMI, COPD, with extensive PAD who presented with acute on chronic LLE limb ischemia, s/p LLE angiogram with intervention  Post procedure LEADs demonstrate an ARIELA of 0 97 with met and toe pressures within healing range  Reports that since his procedure although his pain is better from preop he has significant swelling and pain on the plantar surface of his feet  He has a non-healing gangrenous left lateral foot ulcer which he is not seeking any care for and is not performing any wound care  On exam he has palpable left femoral pulse, biphasic popliteal artery signal, np PT signal and biphasic AT/DP signal  Foot is warm with intact sensation and motor function    On the right he has a 1+ palpable DP, no PT  Has multiple small dry punctate wounds with one large left lateral foot wound with an overlying eschar, it probes down to deep fatty layer tissues  No purulent discharge  Recommend continuing asa/plavix/statin   Recommend wash feet with soap and water and apply betadine to wound  Swelling and pain likely from reperfusion should improve over the next few weeks   STAT f/u with podiatry (referral placed) for surgical debridement   1 month follow-up with repeat LEADs  Counseled on smoking cessation, patient not interested         Problem List Items Addressed This Visit        Cardiovascular and Mediastinum    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)       Musculoskeletal and Integument    Ischemic ulcer of toe of left foot, with unspecified severity ([address] ) - Primary     67yo unhygenic M with extensive smoking history, ongoing, 1PPD, CAD, with STEMI, COPD, with extensive PAD who presented with acute on chronic LLE limb ischemia, s/p LLE angiogram with intervention    Post procedure LEADs demonstrate an ARIELA of 0 97 with met and toe pressures within healing range  Reports that since his procedure although his pain is better from preop he has significant swelling and pain on the plantar surface of his feet  He has a non-healing gangrenous left lateral foot ulcer which he is not seeking any care for and is not performing any wound care  On exam he has palpable left femoral pulse, biphasic popliteal artery signal, np PT signal and biphasic AT/DP signal  Foot is warm with intact sensation and motor function    On the right he has a 1+ palpable DP, no PT  Has multiple small dry punctate wounds with one large left lateral foot wound with an overlying eschar, it probes down to deep fatty layer tissues  No purulent discharge  Recommend continuing asa/plavix/statin   Recommend wash feet with soap and water and apply betadine to wound  Swelling and pain likely from reperfusion should improve over the next few weeks   STAT f/u with podiatry (referral placed) for surgical debridement   1 month follow-up with repeat LEADs  Counseled on smoking cessation, patient not interested         Relevant Orders    Ambulatory referral to Podiatry    VAS lower limb arterial duplex, complete bilateral            Subjective:      Patient ID: Milagros Arguelles is a 60 y o  male  Pt is new to our practice and was referred by Dr Janell Jeans, MD   Pt c/o left foot pain and swelling since the Agram   Incision site is clean and dry with no signs of infection  Pt had a LLE Agram on 2/18/2020 and a JOHN on 2/20/2020  Pt has COPD, CAD, MI and STEMI  Pt is currently taking AA, Plavix and Lipitor  The following portions of the patient's history were reviewed and updated as appropriate: allergies, current medications, past family history, past medical history, past social history, past surgical history and problem list     Review of Systems   Constitutional: Negative  HENT: Negative  Eyes: Negative  Respiratory: Negative      Cardiovascular: Positive for leg swelling (Left Foot)  Left foot pain   Gastrointestinal: Negative  Endocrine: Negative  Genitourinary: Negative  Musculoskeletal: Negative  Skin: Negative  Allergic/Immunologic: Negative  Neurological: Negative  Hematological: Negative  Psychiatric/Behavioral: Negative  I have reviewed and updated the ROS as appropriate  Objective:      /74 (BP Location: Right arm, Patient Position: Sitting, Cuff Size: Adult)   Pulse 80   Temp (!) 97 4 °F (36 3 °C) (Tympanic)   Resp 20   Ht 5' 10" (1 778 m)   Wt 93 9 kg (207 lb)   BMI 29 70 kg/m²          Physical Exam   Constitutional: He is oriented to person, place, and time  He appears well-developed and well-nourished  Grossly unhygienic, smells suffocating of smoke   HENT:   Head: Normocephalic and atraumatic  Eyes: Pupils are equal, round, and reactive to light  EOM are normal    Neck: Normal range of motion  Neck supple  Cardiovascular: Normal rate, regular rhythm and normal heart sounds  Pulses:       Femoral pulses are 2+ on the left side  Popliteal pulses are 0 on the left side  Dorsalis pedis pulses are 1+ on the right side, and 0 on the left side  Posterior tibial pulses are 0 on the right side, and 0 on the left side  Left AT/DP biphasic    Pulmonary/Chest: Effort normal and breath sounds normal    Abdominal: Soft  Bowel sounds are normal  He exhibits no distension  There is no tenderness  Musculoskeletal: Normal range of motion  He exhibits edema, tenderness and deformity  Left lateral foot wound 2x3cm, dry eschar with underlying subcutaneous tissues involved    Neurological: He is alert and oriented to person, place, and time  Skin: Skin is warm and dry  Capillary refill takes 2 to 3 seconds  Psychiatric: He has a normal mood and affect   His behavior is normal  Judgment and thought content normal

## 2020-03-10 NOTE — PROGRESS NOTES
Patient states he saw vascular  He reviewed the visit & his plan of care  He verbalized discouragement that it will take more time for his swelling & pain to improve  He states he was told that podiatry will arrange for transportation & he will worry about his vascular f/u & ultrasound appointment closer to the date  He hopes to have a car soon  He was told by [employer] that they will give him extra food if his doctor calls them  He agrees to call Adeola Louder to make the request  He will let this OP CM know if assistance needed

## 2020-03-10 NOTE — ASSESSMENT & PLAN NOTE
65yo unhygenic M with extensive smoking history, ongoing, 1PPD, CAD, with STEMI, COPD, with extensive PAD who presented with acute on chronic LLE limb ischemia, s/p LLE angiogram with intervention  Post procedure LEADs demonstrate an ARIELA of 0 97 with met and toe pressures within healing range  Reports that since his procedure although his pain is better from preop he has significant swelling and pain on the plantar surface of his feet  He has a non-healing gangrenous left lateral foot ulcer which he is not seeking any care for and is not performing any wound care  On exam he has palpable left femoral pulse, biphasic popliteal artery signal, np PT signal and biphasic AT/DP signal  Foot is warm with intact sensation and motor function    On the right he has a 1+ palpable DP, no PT  Has multiple small dry punctate wounds with one large left lateral foot wound with an overlying eschar, it probes down to deep fatty layer tissues  No purulent discharge       Recommend continuing asa/plavix/statin   Recommend wash feet with soap and water and apply betadine to wound  Swelling and pain likely from reperfusion should improve over the next few weeks   STAT f/u with podiatry (referral placed) for surgical debridement   1 month follow-up with repeat LEADs  Counseled on smoking cessation, patient not interested

## 2020-03-10 NOTE — TELEPHONE ENCOUNTER
[facility] will increase his food if someone calls them and tells them he had too much weight loss   He said he lost 100 lbs in 1 year      [phone] - [employer]

## 2020-03-11 ENCOUNTER — TELEPHONE (OUTPATIENT)
Dept: VASCULAR SURGERY | Facility: CLINIC | Age: 65
End: 2020-03-11

## 2020-03-11 ENCOUNTER — PATIENT OUTREACH (OUTPATIENT)
Dept: CASE MANAGEMENT | Facility: OTHER | Age: 65
End: 2020-03-11

## 2020-03-11 NOTE — TELEPHONE ENCOUNTER
Per below email, transportation has been arranged for pt  On 3/12 pick-up will be 10:30am and Dr Nidia Ferrari office will call [employer] to bring pt home  On  pick-up for [facility] will be at 11:45am   Pt understands the above  From: Hema Dennis   Sent: Tuesday, March 10, 2020 11:18 AM  To: Evelin Foster <[email or web address]  Subject: [facility]/[facility] ride needed    Marc Rush,  I have a pt that will need a few rides booked with [employer] or [facility]  Dolly Guerrero  1955  He has an appt w/ Dr Aicha Rosas office on 3/12/2020 at 11:15 am, the address is [facility] [address] and on 2020 he has an JOHN and Ov w/ Dr Alma Lamb at 1 pm at [facility]  If you need me to set these up let me know, I know last time I tried they gave me some issues because I did not have a cost center number      Reno Carrillo "Dillan Yang" Raheel Guardado

## 2020-03-11 NOTE — PROGRESS NOTES
Received a call from the patient stating that he tried calling podiatry to confirm his Lyft for tomorrow, but there was no answer  Called the podiatry office  They state they are unaware of transportation arrangement  The podiatry office is an affiliated provider  Called vascular to see if they made arrangements  They state they were not notified that the patient needed transport, but will call [facility] to see if it can still be arranged & call this OP CM back only if they can't  They provided the number for podiatry to call when the patient finishes his appointment to request a ride home  Called podiatry to make them aware  They agree to call [facility] for a return ride via [place]  Number provided  Noted in the chart that transportation was scheduled & the patient was made aware

## 2020-03-12 ENCOUNTER — PATIENT OUTREACH (OUTPATIENT)
Dept: CASE MANAGEMENT | Facility: OTHER | Age: 65
End: 2020-03-12

## 2020-03-12 ENCOUNTER — TELEPHONE (OUTPATIENT)
Dept: VASCULAR SURGERY | Facility: CLINIC | Age: 65
End: 2020-03-12

## 2020-03-12 DIAGNOSIS — L97.529 ISCHEMIC ULCER OF TOE OF LEFT FOOT, WITH UNSPECIFIED SEVERITY (HCC): Primary | ICD-10-CM

## 2020-03-12 NOTE — TELEPHONE ENCOUNTER
Nidia Ruth from outpt care mgmt called  Dr Ivon Eckert  wanted him to f/u w/ podiatry stat per last ov note  She states Dr Madelyn Celis reviewed pt and said he does not want to see him, he should f/u w/ wound care center at [facility]  she is asking if Dr Ivon Eckert will refer him to wound care center  TT Dr Ivon Eckert - he said it is ok to refer to [address] [place] [address],[address]  Will place order, s/w Adam Hussein, she will assist in scheduling  pt

## 2020-03-12 NOTE — TELEPHONE ENCOUNTER
Spoke with patient  Called to wound care in [place] to make appt  Had to leave a message for them to call back the patient  Did stress that he needed it today or tomorrow  Patient will call back so we can set up lyft for him to go to this appt

## 2020-03-12 NOTE — PROGRESS NOTES
Received a call from [facility] in Dr Merlyn Dutton office stating that if the patient has an infected wound he needs to go to the wound center, they do not pay for Lyft transport, & there is a $50 co-pay due at the time of the visit  Called Sarah back to make her aware that per vascular notes, the patient has a wound that needs debriding, & that this OP CM spoke to the office yesterday to make them aware vascular is paying for a Lyft  The only thing podiatry is being asked to do, is call [facility] when the patient's appointment is done  [facility] states that she spoke to the patient & the doctor  The patient can't afford the co-pay & Dr Madelyn Celis wants him to go to the wound center  Vascular made aware & they will request a referral & have their schedulers call the wound center  Patient called & left a message saying call podiatry  Returned his call to make him aware that podiatry will not be able to see him & recommends the wound center  Vascular will be making the arrangements  He insists on an appointment today  Made him aware that that may not be possible & he would be contacted by vascular  He states understanding

## 2020-03-13 ENCOUNTER — APPOINTMENT (OUTPATIENT)
Dept: WOUND CARE | Facility: HOSPITAL | Age: 65
End: 2020-03-13
Payer: COMMERCIAL

## 2020-03-13 ENCOUNTER — TELEPHONE (OUTPATIENT)
Dept: CARDIOLOGY CLINIC | Facility: CLINIC | Age: 65
End: 2020-03-13

## 2020-03-13 ENCOUNTER — PATIENT OUTREACH (OUTPATIENT)
Dept: CASE MANAGEMENT | Facility: OTHER | Age: 65
End: 2020-03-13

## 2020-03-13 PROCEDURE — 11043 DBRDMT MUSC&/FSCA 1ST 20/<: CPT

## 2020-03-13 PROCEDURE — 99213 OFFICE O/P EST LOW 20 MIN: CPT

## 2020-03-13 NOTE — PROGRESS NOTES
Patient called to say the wound center cleaned out his wound & that it almost reaches to the bone  If it doesn't heal, he may require amputation  He states he was given supplies & will try to keep it clean & change the bandages himself until home health resumes on Monday  Patient states he was already discharged, but Leela Flores from the wound center spoke to [address] [employer] sent a new referral  He will also need the rollator as soon as possible  Message left for Jonathan to confirm referral      Spoke to [facility]  They got the order & are waiting for the patient's insurance company to approve the rollator

## 2020-03-13 NOTE — TELEPHONE ENCOUNTER
Nghia Castro from outreach services called  Patient took last aspirin 81 today  Doesn't have the means to get any until sometime next week  Just an FYI, unless you have any recommendations

## 2020-03-16 ENCOUNTER — TELEPHONE (OUTPATIENT)
Dept: FAMILY MEDICINE CLINIC | Facility: CLINIC | Age: 65
End: 2020-03-16

## 2020-03-16 ENCOUNTER — PATIENT OUTREACH (OUTPATIENT)
Dept: CASE MANAGEMENT | Facility: OTHER | Age: 65
End: 2020-03-16

## 2020-03-16 DIAGNOSIS — L03.116 CELLULITIS OF LEFT FOOT: ICD-10-CM

## 2020-03-16 DIAGNOSIS — G89.29 CHRONIC PAIN OF RIGHT KNEE: ICD-10-CM

## 2020-03-16 DIAGNOSIS — M25.561 CHRONIC PAIN OF RIGHT KNEE: ICD-10-CM

## 2020-03-16 DIAGNOSIS — L97.529 ISCHEMIC ULCER OF TOE OF LEFT FOOT, WITH UNSPECIFIED SEVERITY (HCC): Primary | ICD-10-CM

## 2020-03-16 NOTE — TELEPHONE ENCOUNTER
Dr Isabel Oakland form from [facility] placed in your folder for signature   Thanks

## 2020-03-16 NOTE — PROGRESS NOTES
Called [facility] to follow up on the patient's rollator  They state they just heard back from his insurance - their DME company is out of network & he would have a $400 co-pay for the rollator  Left a voicemail for Tisha Elias, unit rep at [email or web address] who initiated rollator order  Made her aware of the above issue  Spoke to Lam nurse at [facility]  Explained the situation to her  She will put in a request for a new order to [facility] with Dr Bakari Monet  Called the patient to make him aware  He states he wants the walker he was given in the hospital  Stated that was probably the hospital's & a message was previously left for inpatient, but he was positive it was his  Found inpatient note from 2/21/20 stating that a walker was ordered & delivered for the patient from [address]  Left a voicemail for inpatient case management at [place] regarding the current status of his walker  OP CM phone number provided for call back

## 2020-03-16 NOTE — TELEPHONE ENCOUNTER
pls send new order to DME maybe [facility] for Rollator with dx  [id number] any question pls  Call his out patient care manager [facility]  @ [phone]   Thanks

## 2020-03-17 ENCOUNTER — PATIENT OUTREACH (OUTPATIENT)
Dept: CASE MANAGEMENT | Facility: OTHER | Age: 65
End: 2020-03-17

## 2020-03-17 ENCOUNTER — TELEPHONE (OUTPATIENT)
Dept: FAMILY MEDICINE CLINIC | Facility: CLINIC | Age: 65
End: 2020-03-17

## 2020-03-17 ENCOUNTER — HOSPITAL ENCOUNTER (EMERGENCY)
Facility: HOSPITAL | Age: 65
Discharge: HOME | End: 2020-03-17
Attending: EMERGENCY MEDICINE | Admitting: EMERGENCY MEDICINE
Payer: COMMERCIAL

## 2020-03-17 VITALS
SYSTOLIC BLOOD PRESSURE: 124 MMHG | RESPIRATION RATE: 18 BRPM | DIASTOLIC BLOOD PRESSURE: 70 MMHG | OXYGEN SATURATION: 99 % | TEMPERATURE: 97.6 F | HEART RATE: 69 BPM

## 2020-03-17 DIAGNOSIS — L97.929: Primary | ICD-10-CM

## 2020-03-17 PROCEDURE — 99282 EMERGENCY DEPT VISIT SF MDM: CPT | Performed by: EMERGENCY MEDICINE

## 2020-03-17 PROCEDURE — 99284 EMERGENCY DEPT VISIT MOD MDM: CPT

## 2020-03-17 PROCEDURE — 97165 OT EVAL LOW COMPLEX 30 MIN: CPT

## 2020-03-17 PROCEDURE — 97162 PT EVAL MOD COMPLEX 30 MIN: CPT

## 2020-03-17 RX ORDER — CEPHALEXIN 500 MG/1
500 CAPSULE ORAL 2 TIMES DAILY
COMMUNITY
Start: 2020-03-15 | End: 2020-05-12 | Stop reason: ALTCHOICE

## 2020-03-17 NOTE — PHYSICAL THERAPY NOTE
PT EVALUATION     03/17/20 1610   Pain Assessment   Pain Assessment Tool Pain Assessment not indicated - pt denies pain   Home Living   Type of [address] One level;Elevator   Home Equipment   (none)   Additional Comments patient ambulating without assistive device prior to ED visit although should be limiting the amount of walking due to L foot wound as per patient   Prior Function   Level of [place] Independent with ADLs and functional mobility   Lives With Alone   Receives Help From Home health  (VNA for wound care L foot)   ADL Assistance Independent   IADLs Needs assistance   Comments patient states getting meals on wheels and going by transport to wound center   Restrictions/Precautions   Other Precautions   (wound L foot)   General   Additional Pertinent History chart reviewed, patient seen in ED from unkempt home with L foot wound and pateint following with wound center prior to admission   Family/Caregiver Present No   Cognition   Overall Cognitive Status WFL   Arousal/Participation Cooperative   Attention Within functional limits   Orientation Level Oriented X4   Following Commands Follows all commands and directions without difficulty   Comments patient independent prior to admission although presents as unkempt   RLE Assessment   RLE Assessment WFL   LLE Assessment   LLE Assessment WFL   Coordination   Movements are Fluid and Coordinated 1   Bed Mobility   Supine to Sit 7  Independent   Sit to Supine 7  Independent   Transfers   Sit to Stand 7  Independent   Stand to Sit 7  Independent   Toilet transfer 7  Independent   Ambulation/Elevation   Gait Assistance 7  Independent   Assistive Device   (none)   Distance 30 feet with change in direction, no balance loss  Patient verbalizing that roller walker was given to him at [facility] and he lost it   Patient was using roller walker for limiting weight bearing on LLE/wound area   Balance   Static Sitting Good   Dynamic Sitting Good   Static Standing Good   Dynamic Standing Good   Ambulatory Good   Higher level balance   (good high level balance with sidestepping, no balance loss)   Activity Tolerance   Activity Tolerance Patient tolerated treatment well   Nurse Made Aware yes   Assessment   Assessment   Patient seen for Physical Therapy evaluation  Patient admitted with wound L foot, limited ability to care for self  Comorbidities affecting patient's physical performance include: bipolar, CAD, schizo-affective psychosis, cholecystectomy, knee surgery and back surgery, wound L foot  Patient now presents as independent with gait, transfers and ADLs and does not required skilled PT  Patient states unsteady gait at home and lost his walker at the [facility] and would benefit from use of roller walker with limitations to weight bearing on L foot with wound and patient following with wound care  Personal factors affecting patient at time of initial evaluation include: limited home support, inability to perform caregiver support/tasks, inability to perform physical activity and inability to perform IADLS   Prior to admission, patient was independent with functional mobility without assistive device, independent with ADLS, independent with IADLS, ambulating household distance and ambulating community distances  Please find objective findings from Physical Therapy assessment regarding body systems outlined above with impairments and limitations including no new functional deficits  The Barthel Index was used as a functional outcome tool presenting with a score of 100 today indicating no limitations of functional mobility and ADLS  Patient's clinical presentation is currently evolving  as seen in patient's presentation of no new functional deficits  As demonstrated by objective findings, the assigned level of complexity for this evaluation is moderate     Goals   Patient Goals to get a walker because he lost his   STG Expiration Date   (NA)   LTG Expiration Date   (NA)   Plan   Treatment/Interventions   (none)   Recommendation   Recommendation Home independently; Other (Comment)  (follow up wound care and VNA)   Additional Comments patient needs walker for home, "left his walker at the [facility]"   Barthel Index   Feeding 10   Bathing 5   Grooming Score 5   Dressing Score 10   Bladder Score 10   Bowels Score 10   Toilet Use Score 10   Transfers (Bed/Chair) Score 15   Mobility (Level Surface) Score 15   Stairs Score 5   Barthel Index Score 80   Licensure   [place] License Number  Valeri Leyden  [id number]

## 2020-03-17 NOTE — OCCUPATIONAL THERAPY NOTE
OT Evaluation       03/17/20 5255   Note Type   Note type Eval only   Restrictions/Precautions   Other Precautions Fall Risk   Home Living   Type of [address] One level;Elevator  (No Stairs)   Bathroom Shower/Tub Tub/shower unit   Bathroom Toilet Standard   Bathroom Equipment Grab bars in [facility] Other (Comment)  (None)   Additional Comments Patient recently at [facility] and was recommended a RW by PT but never received it so has been ambulating without AD at home   Prior Function   Level of [place] Independent with ADLs and functional mobility   Lives With Alone   ADL Assistance Independent   IADLs Needs assistance   Comments Patient receives meals on wheels, uses Lyft transportation for wound care appointments  Patient reports he has been struggling to care for himself at home recently   Patient reports having difficulty bending forward to perform lower body ADLs, has been leaving shoes on and changes pants only every few days   ADL   Eating Assistance 7  Independent   Grooming Assistance 7  Independent   Grooming Deficit Wash/dry hands  (Standing unsupported to sink)   UB Bathing Assistance 7  Independent   LB Bathing Assistance 4  Minimal Assistance   UB Dressing Assistance 7  Independent   LB Dressing Assistance 4  [address]  7  Independent   Bed Mobility   Supine to Sit 7  Independent   Sit to Supine 7  Independent   Transfers   Sit to Stand 7  Independent   Stand to Sit 7  Independent   Stand pivot 7  Independent   Toilet transfer 7  Independent   Additional items Standard toilet;Assist x 1  (Ambulatory transfer to/from standard toilet without AD)   Functional Mobility   Functional Mobility 7  Independent   Additional Comments Patient ambulated long household distance with and without RW with independence   Additional items Rolling walker   Balance   Static Sitting Good   Dynamic Sitting Good   Activity Tolerance   Activity Tolerance Patient tolerated treatment well   RUE Assessment   RUE Assessment WFL  (Strength grossly 4+/5 throughout)   LUE Assessment   LUE Assessment WFL  (Strength grossly 4+/5 throughout)   Cognition   Overall Cognitive Status WFL   Arousal/Participation Alert; Cooperative   Attention Within functional limits   Orientation Level Oriented X4   Following Commands Follows all commands and directions without difficulty   Assessment   Prognosis Good   Assessment Patient evaluated by Occupational Therapy  Patient admitted with L foot wound, inability to care for self  The patients occupational profile, medical and therapy history includes a brief history with review of medical and/or therapy records related to the presenting problem  Comorbidities affecting functional mobility and ADLS include: schizo-affective psychosis, Bipolar disorder, CAD, hx vfib  Prior to admission, patient was independent with functional mobility without assistive device, independent with ADLS and requiring assist for IADLS  The evaluation identifies the following performance deficits: no deficits, that result in activity limitations and/or participation restrictions  This evaluation requires clinical decision making of low complexity, because the patients presents with no comorbidities that affect occupational performance and required no modification of tasks or assistance with consideration of a limited number of treatment options  The Barthel Index was used as a functional outcome tool presenting with a score of 90, indicating minimal limitations of functional mobility and ADLS  Patient currently presents at baseline functional status: no skilled inpatient OT services indicated at this time     Goals   Patient Goals To go home   Recommendation   OT Discharge Recommendation Home with family support   OT - OK to Discharge Yes   Barthel Index   Feeding 10   Bathing 5   Grooming Score 5   Dressing Score 5   Bladder Score 10   Bowels Score 10   Toilet Use Score 10   Transfers (Bed/Chair) Score 15   Mobility (Level Surface) Score 15   Stairs Score 5   Barthel Index Score 80   Licensure   [place] Number  [place] OTR/L [id number]

## 2020-03-17 NOTE — ED NOTES
Per Megan Mancilla from case management, patient does not meet criteria to be placed in a nursing home  Suggests consulting physical and occupational therapy to assess patient to see if he meets criteria to go to a rehab facility  Dr Rowena Bhandari to be notified        Emerson Cannon RN  03/17/20 6224

## 2020-03-17 NOTE — ED NOTES
Pt foot re-wrapped and boot placed back on pt's foot  Call placed to PT/OT to come and consult patient to see if he is a candidate for short term rehab  Pt is aware of current plan        Natalia Good RN  03/17/20 6262

## 2020-03-17 NOTE — ED NOTES
3/17/20 @ 1432:  PATITO talked to Geraldo Aranda from [facility], who completed mobile outreach  Geraldo Aranda reports that she received a call from patient's sister and wanted patient "committed to a nursing home "  Geraldo Aranda denies that there was any report indicating need for inpatient treatment; patient denies SI/HI, and daughter denied as well  Apparently, there were concerns that patient wasn't caring for self, but this is only committable if patient is putting himself in a very dangerous situation due to mental illness, which doesn't seem to be the case  Patient is receiving in-home services for his foot, and according to reports, is being compliant with his treatment  Unfortunately, patient is allowed to live in unkept home, and is not actually committable  PATITO called Robel to discuss, and she states that there wasn't any apparent committable behaviors  Patient says he wants to move to a different place, preferably to [facility]  Patient to be discharged home    Surya Johnson MS

## 2020-03-17 NOTE — ED PROVIDER NOTES
History  Chief Complaint   Patient presents with    Psychiatric Evaluation     Pt brought in by EMS, who state that Pt's daughter called for an outreach due to pt not being well mentally or physically, pt agrees he cannot take care of himself  Pt has dirty hands, feet, and body with a strong odor  Pt does state he has  L foot ulcer that is being managed by a visiting nurse, and is not in any physical pain  Pt denies any SI or HI and states he just wants help around his house and wants help to get to another [facility]  Patient is a 22-year-old male that came in by squad for reportedly getting a call from the patient's daughter to [facility] because of his living conditions  The daughter and family guidance were unsure there is a psychiatric issues to why he was not caring well for himself  Patient does have a nonhealing foot ulcer he he has a visiting nurse that comes to the house he is on oral antibiotics and being followed by wound care  Patient has no new complaints about his foot  Patient is talking about 1 and a go to a prison house or some other living facility so that he can be taken better care of  Patient denies any suicidal or homicidal ideations, he has no loose in a shins, he is awake alert oriented x 3  Our mental health worker called [facility] who spoke to the daughter is clear that he most likely needs some  but no psychiatric treatment  The patient denies any fevers, no cough cold or flu-like symptoms  He has no chest pain or shortness of breath  He denies any abdominal pain, no nausea vomiting or diarrhea  Prior to Admission Medications   Prescriptions Last Dose Informant Patient Reported? Taking? Misc   Devices (ROLLATOR) MISC   No No   Sig: by Does not apply route as needed (for ambulation)   Nutritional Supplements (ENSURE NUTRITION SHAKE) LIQD  Self No No   Sig: Take 1 Bottle by mouth 3 (three) times a day   aspirin 81 mg chewable tablet  Self No No   Sig: Chew 1 tablet (81 mg total) daily   atorvastatin (LIPITOR) 80 mg tablet  Self No No   Sig: Take 1 tablet (80 mg total) by mouth every evening   cephalexin (KEFLEX) 500 mg capsule   Yes No   Sig: Take 500 mg by mouth 2 (two) times a day   clopidogrel (PLAVIX) 75 mg tablet  Self No No   Sig: Take 1 tablet (75 mg total) by mouth daily   metoprolol succinate (TOPROL-XL) 25 mg 24 hr tablet  Self No No   Sig: Take 1 tablet (25 mg total) by mouth daily   nitroglycerin (NITROSTAT) 0 4 mg SL tablet  Self No No   Sig: Place 1 tablet (0 4 mg total) under the tongue every 5 (five) minutes as needed for chest pain   ranolazine (RANEXA) 500 mg 12 hr tablet  Self No No   Sig: Take 1 tablet (500 mg total) by mouth 2 (two) times a day   topiramate (TOPAMAX) 100 mg tablet  Self No No   Sig: Take 1 tablet (100 mg total) by mouth 2 (two) times a day      Facility-Administered Medications: None       Past Medical History:   Diagnosis Date    Bipolar 1 disorder ([address] )     CAD (coronary artery disease)     History of ventricular fibrillation 12/27/2019    in setting in-stent thrombosis; received defibrillation with 200J x4; received less than 30s chest compressions before ROSC achieved    Schizo-affective psychosis ([address] )     Tobacco user        Past Surgical History:   Procedure Laterality Date    BACK SURGERY      CARDIAC SURGERY      stent placement    CHOLECYSTECTOMY      IR LOWER EXTREMITY / INTERVENTION  2/18/2020    KNEE SURGERY Right        Family History   Problem Relation Age of Onset    Heart disease Mother     Cancer Mother     Heart disease Father     Cancer Father     Multiple sclerosis Sister     Cancer Sister      I have reviewed and agree with the history as documented      E-Cigarette/Vaping    E-Cigarette Use Never User      E-Cigarette/Vaping Substances     Social History     Tobacco Use    Smoking status: Heavy Tobacco Smoker     Packs/day: 1 50     Types: Cigarettes    Smokeless tobacco: Never Used   Substance Use Topics    Alcohol use: Not Currently     Frequency: Never    Drug use: Never       Review of Systems   Constitutional: Negative for chills and fever  HENT: Negative for facial swelling and trouble swallowing  Respiratory: Negative for chest tightness and shortness of breath  Cardiovascular: Negative for chest pain and leg swelling  Gastrointestinal: Negative for abdominal pain, nausea and vomiting  Genitourinary: Negative for dysuria and flank pain  Musculoskeletal: Negative for back pain and neck pain  Skin: Positive for wound  Neurological: Negative for weakness, numbness and headaches  Hematological: Negative  Psychiatric/Behavioral: Negative  Physical Exam  Physical Exam   Constitutional: Vital signs are normal  No distress  Patient is disheveled and fairly I did clean and in poor hygiene   HENT:   Head: Normocephalic and atraumatic  Neck: Normal range of motion  Cardiovascular: Normal rate and regular rhythm  Pulmonary/Chest: Effort normal and breath sounds normal    Abdominal: Soft  He exhibits no distension  There is no tenderness  Musculoskeletal:        Feet:    There is a healing wound with minimal discharged and some erythema surrounding it, there is no obvious severe cellulitis at this time   Neurological: He is alert  Skin: There is erythema  Psychiatric: He has a normal mood and affect  Nursing note and vitals reviewed        Vital Signs  ED Triage Vitals [03/17/20 1417]   Temperature Pulse Respirations Blood Pressure SpO2   97 6 °F (36 4 °C) 69 18 124/70 99 %      Temp Source Heart Rate Source Patient Position - Orthostatic VS BP Location FiO2 (%)   Oral Monitor Sitting Right arm --      Pain Score       --           Vitals:    03/17/20 1417   BP: 124/70   Pulse: 69   Patient Position - Orthostatic VS: Sitting         Visual Acuity      ED Medications  Medications - No data to display    Diagnostic Studies  Results Reviewed None                 No orders to display              Procedures  Procedures         ED Course                                 MDM  Number of Diagnoses or Management Options  Nonhealing ulcer of left lower extremity [facility]):   Diagnosis management comments: Patient was seen by mental health worker, there is no psychiatric issues at this point  The patient was also seen by social work and PT OT evaluation at this time is going to be sent home with a walker and he is to follow-up with wound care  If he wants different living facilities he needs to get in touch with his daughter and work on finding alternative places to live  Disposition  Final diagnoses:   Nonhealing ulcer of left lower extremity ([address] )     Time reflects when diagnosis was documented in both MDM as applicable and the Disposition within this note     Time User Action Codes Description Comment    3/17/2020  4:08 PM Donna Bhakta Add [G24 190] Nonhealing ulcer of left lower extremity [facility])       ED Disposition     ED Disposition Condition Date/Time Comment    Discharge Stable Tue Mar 17, 2020  4:07 PM Bro Job Yolanda discharge to home/self care  Follow-up Information     Follow up With Specialties Details Why Contact Info Additional Information    [facility] Emergency Department Emergency Medicine  If symptoms worsen [facility]  [phone] [facility] ED, [facility], [address]          Patient's Medications   Discharge Prescriptions    No medications on file     No discharge procedures on file      PDMP Review       Value Time User    PDMP Reviewed  Yes 12/29/2019  8:17 AM Jaime Blanco PA-C          ED Provider  Electronically Signed by           Margarita Briceño MD  03/17/20 2757

## 2020-03-17 NOTE — PROGRESS NOTES
Multiple calls made regarding patient's rollator  Representative at [employer] that they can have the rollator delivered to the patient  They will call the patient directly

## 2020-03-17 NOTE — ED NOTES
Per PT/OT pt could use a walker but does not need inpatient rehab  MD to be notified        Jaelyn Verma RN  03/17/20 5197

## 2020-03-17 NOTE — TELEPHONE ENCOUNTER
clled meals on wheels and left another  Message with my extension to call back   Patient asked that I do this  He said that he is having trouble getting food

## 2020-03-18 NOTE — TELEPHONE ENCOUNTER
Patient does not have transportation to come to office, and states he has no one who could come pick it up for him

## 2020-03-19 ENCOUNTER — TELEPHONE (OUTPATIENT)
Dept: VASCULAR SURGERY | Facility: CLINIC | Age: 65
End: 2020-03-19

## 2020-03-19 ENCOUNTER — TELEPHONE (OUTPATIENT)
Dept: FAMILY MEDICINE CLINIC | Facility: CLINIC | Age: 65
End: 2020-03-19

## 2020-03-19 NOTE — TELEPHONE ENCOUNTER
Pt's daughter called (she is not listed on communication consent, however I obtained verbal consent from the pt to speak to her)  She is concerned over pt's wellbeing and his ability to care for himself and take care of his wounds  Pt does have visiting nurses who do wound care and pt saw Dr Katina Olmstead on 3/10 and was given referral for podiatry  Per pt's daughter, pt's house is very dirty and she is concerned for infection  Pt went to ER on 3/17 and per note "there is a healing wound w/ minimal discharge and some erythema surrounding it, no obvious cellulitis at this time " Per daughter, the wound was cleaned at the ER  Pt's daughter wants pt admitted to a facility to care for him  I discussed w/ Dee Smyth, nurse navigator, who advised that she needs to f/u w/ pt's PCP to see if they can coordinate this for pt  I advised pt's daughter that if there is any concern for infection or if pt has new symptoms he needs to report back to ER  She verbalized understanding and is going to call PCP/wound care/podiatry

## 2020-03-23 ENCOUNTER — APPOINTMENT (OUTPATIENT)
Dept: WOUND CARE | Facility: HOSPITAL | Age: 65
End: 2020-03-23
Payer: COMMERCIAL

## 2020-03-23 ENCOUNTER — PATIENT OUTREACH (OUTPATIENT)
Dept: CASE MANAGEMENT | Facility: OTHER | Age: 65
End: 2020-03-23

## 2020-03-23 PROCEDURE — 11043 DBRDMT MUSC&/FSCA 1ST 20/<: CPT

## 2020-03-23 NOTE — PROGRESS NOTES
Patient called to say he was given a protective boot by the wound center  He did not get his rollator  He verbalizes depression r/t missing his girlfriend & not being able to see her due to quarantine restrictions  A nurse from He feels assisting living would be appropriate as he is having difficulties caring for himself  His preference is to find a facility in the [facility] area  Spoke to Flakito Fernando who assists in finding placement  He will call the patient to discuss options based on his needs & finances  Spoke to [facility]ENDON regarding depression  They are happy to provide the patient with resources if he is interested  Spoke to [employer]  They were unable to reach the patient to schedule delivery  They state they can arrange delivery now, however, there is a 20% co-pay & it is for a rolling walker, not a rollator  If the patient would like a rollator, there is a $75 upcharge plus the 20% copay  Attempted to call the patient back  No answer & unable to leave a message

## 2020-03-23 NOTE — PROGRESS NOTES
Received an email from Christiane Malagon who patient was referred to for assistance with [facility] placement  Georgina Garrison states the patient is not interested in assisted living, he wants low income housing  Georgina Garrison was also going to research medicaid for the patient  Replied to Georgina Garrison to make him aware that the patient is not eligible for medicaid & also to make him aware that the patient is in low income housing  Georgina Garrison wrote back to say that in that case, the patient is unable to afford [facility] placement

## 2020-03-25 ENCOUNTER — APPOINTMENT (OUTPATIENT)
Dept: WOUND CARE | Facility: HOSPITAL | Age: 65
End: 2020-03-25
Payer: COMMERCIAL

## 2020-03-25 ENCOUNTER — PATIENT OUTREACH (OUTPATIENT)
Dept: CASE MANAGEMENT | Facility: OTHER | Age: 65
End: 2020-03-25

## 2020-03-25 PROCEDURE — 29445 APPL RIGID TOT CNTC LEG CAST: CPT

## 2020-03-25 NOTE — PROGRESS NOTES
Spoke to inpatient CM, Layla Grant, at [facility] regarding the patient's rolling walker from his February stay  She states she will research & call this OP CM back or the patient directly  Spoke to the patient to make him aware  He states understanding  He states [address] discharged him because he's going to the wound center regularly  They are sending him a Lyft for today's appointment  Patient states that there was a misunderstanding & he is interested in assisted living  Made him aware that unfortunately since his not on medicaid or medicaid eligible he would need to pay $5000+/month which he can't afford  Made him aware that he should call his PCP for any new or worsening symptoms  For non-emergent needs or questions, he can call the main outpatient department number  He requests the number be texted to him  In basket request sent to [facility] for work cell text & confirmation received  Called Stephane Mcgarry back  He states that assisted living is not like LTC & he would have to pay 2 yrs privately & then go on [place]  There is one facility that will accept $60K-$70K up front & then allow medicaid  Another facility accepts people with Qualified Income Trusts that would then go on medicaid  Message left for [facility] legal services to see if this is something they do, call back number provided

## 2020-03-26 ENCOUNTER — PATIENT OUTREACH (OUTPATIENT)
Dept: CASE MANAGEMENT | Facility: OTHER | Age: 65
End: 2020-03-26

## 2020-03-27 ENCOUNTER — TELEPHONE (OUTPATIENT)
Dept: FAMILY MEDICINE CLINIC | Facility: CLINIC | Age: 65
End: 2020-03-27

## 2020-03-27 NOTE — TELEPHONE ENCOUNTER
Form/order from [facility] regarding d/c of Keflex, needs signature and faxed back    Form is in your folder in precept room

## 2020-03-27 NOTE — PROGRESS NOTES
Received a call from inpatient CM at [facility]  She contacted the patient to discuss arrangements for walker delivery, but states the patient told her he has one & doesn't need it  Received a call back from [facility] legal services, trust accounts are not a service they provide  They cannot offer any recommendations for firms that offer that service

## 2020-03-30 ENCOUNTER — APPOINTMENT (OUTPATIENT)
Dept: WOUND CARE | Facility: HOSPITAL | Age: 65
End: 2020-03-30
Payer: COMMERCIAL

## 2020-03-30 PROCEDURE — 11043 DBRDMT MUSC&/FSCA 1ST 20/<: CPT

## 2020-03-31 ENCOUNTER — PATIENT OUTREACH (OUTPATIENT)
Dept: CASE MANAGEMENT | Facility: OTHER | Age: 65
End: 2020-03-31

## 2020-04-02 ENCOUNTER — TELEMEDICINE (OUTPATIENT)
Dept: FAMILY MEDICINE CLINIC | Facility: CLINIC | Age: 65
End: 2020-04-02
Payer: MEDICARE

## 2020-04-02 ENCOUNTER — PATIENT OUTREACH (OUTPATIENT)
Dept: CASE MANAGEMENT | Facility: OTHER | Age: 65
End: 2020-04-02

## 2020-04-02 DIAGNOSIS — I25.110 CORONARY ARTERY DISEASE INVOLVING NATIVE CORONARY ARTERY OF NATIVE HEART WITH UNSTABLE ANGINA PECTORIS (HCC): ICD-10-CM

## 2020-04-02 DIAGNOSIS — I21.A1 TYPE 2 MYOCARDIAL INFARCTION WITHOUT ST ELEVATION (HCC): ICD-10-CM

## 2020-04-02 DIAGNOSIS — R63.8 INADEQUATE ORAL INTAKE: ICD-10-CM

## 2020-04-02 DIAGNOSIS — R63.4 WEIGHT LOSS, UNINTENTIONAL: ICD-10-CM

## 2020-04-02 DIAGNOSIS — Z12.11 COLON CANCER SCREENING: ICD-10-CM

## 2020-04-02 DIAGNOSIS — I21.3 STEMI (ST ELEVATION MYOCARDIAL INFARCTION) (HCC): ICD-10-CM

## 2020-04-02 DIAGNOSIS — I20.1 CORONARY ARTERY SPASM (HCC): ICD-10-CM

## 2020-04-02 DIAGNOSIS — R13.10 DYSPHAGIA, UNSPECIFIED TYPE: Primary | ICD-10-CM

## 2020-04-02 DIAGNOSIS — L97.529 ISCHEMIC ULCER OF TOE OF LEFT FOOT, WITH UNSPECIFIED SEVERITY (HCC): ICD-10-CM

## 2020-04-02 DIAGNOSIS — I20.8 CHRONIC STABLE ANGINA (HCC): ICD-10-CM

## 2020-04-02 DIAGNOSIS — I21.4 MI, ACUTE, NON ST SEGMENT ELEVATION (HCC): ICD-10-CM

## 2020-04-02 DIAGNOSIS — F31.9 BIPOLAR 1 DISORDER (HCC): ICD-10-CM

## 2020-04-02 DIAGNOSIS — I70.90 ARTERIAL OCCLUSION: ICD-10-CM

## 2020-04-02 PROCEDURE — G2012 BRIEF CHECK IN BY MD/QHP: HCPCS | Performed by: FAMILY MEDICINE

## 2020-04-02 RX ORDER — LACTOSE-REDUCED FOOD
1 LIQUID (ML) ORAL 3 TIMES DAILY
Qty: 90 BOTTLE | Refills: 2 | Status: SHIPPED | OUTPATIENT
Start: 2020-04-02 | End: 2020-04-13 | Stop reason: SDUPTHER

## 2020-04-02 RX ORDER — TOPIRAMATE 100 MG/1
100 TABLET, FILM COATED ORAL 2 TIMES DAILY
Qty: 60 TABLET | Refills: 1
Start: 2020-04-02

## 2020-04-02 RX ORDER — ATORVASTATIN CALCIUM 80 MG/1
80 TABLET, FILM COATED ORAL EVERY EVENING
Qty: 90 TABLET | Refills: 3 | Status: SHIPPED | OUTPATIENT
Start: 2020-04-02

## 2020-04-02 RX ORDER — RANOLAZINE 500 MG/1
500 TABLET, EXTENDED RELEASE ORAL 2 TIMES DAILY
Qty: 180 TABLET | Refills: 3 | Status: SHIPPED | OUTPATIENT
Start: 2020-04-02

## 2020-04-02 RX ORDER — ASPIRIN 81 MG/1
81 TABLET, CHEWABLE ORAL DAILY
Qty: 90 TABLET | Refills: 3 | Status: SHIPPED | OUTPATIENT
Start: 2020-04-02

## 2020-04-02 RX ORDER — NITROGLYCERIN 0.4 MG/1
0.4 TABLET SUBLINGUAL
Qty: 30 TABLET | Refills: 3 | Status: SHIPPED | OUTPATIENT
Start: 2020-04-02

## 2020-04-02 RX ORDER — CLOPIDOGREL BISULFATE 75 MG/1
75 TABLET ORAL DAILY
Qty: 90 TABLET | Refills: 3 | Status: SHIPPED | OUTPATIENT
Start: 2020-04-02

## 2020-04-02 RX ORDER — METOPROLOL SUCCINATE 25 MG/1
25 TABLET, EXTENDED RELEASE ORAL DAILY
Qty: 90 TABLET | Refills: 1 | Status: SHIPPED | OUTPATIENT
Start: 2020-04-02

## 2020-04-02 NOTE — PROGRESS NOTES
Patient called to say he still wasn't approved for Ensure by insurance & he's concerned because he lost 100+ lbs over 6-12 months  He was also supposed to received extra food from meals on wheels, but hasn't so far  He reports a poor appetite overall, but is concerned that there may be something wrong  Advised the patient to call his PCP at [facility], but he states he tried twice & was on hold for an hour  Called [facility] & spoke to nurse, Mike Giles  She states the form for [employer] stating a need for extra food was submitted by their office  She recommends the patient call [facility] & arrange to speak to a physician regarding his weight loss concerns  Called [employer] twice & was finally able to leave a message requesting follow up  Attempted to call the patient back, but their was no answer & a message stated the call could not be completed at this time

## 2020-04-03 ENCOUNTER — APPOINTMENT (OUTPATIENT)
Dept: WOUND CARE | Facility: HOSPITAL | Age: 65
End: 2020-04-03
Payer: MEDICARE

## 2020-04-03 ENCOUNTER — TELEPHONE (OUTPATIENT)
Dept: VASCULAR SURGERY | Facility: CLINIC | Age: 65
End: 2020-04-03

## 2020-04-03 PROCEDURE — 29445 APPL RIGID TOT CNTC LEG CAST: CPT

## 2020-04-07 ENCOUNTER — APPOINTMENT (OUTPATIENT)
Dept: WOUND CARE | Facility: HOSPITAL | Age: 65
End: 2020-04-07
Payer: MEDICARE

## 2020-04-07 PROCEDURE — 29445 APPL RIGID TOT CNTC LEG CAST: CPT

## 2020-04-08 ENCOUNTER — PATIENT OUTREACH (OUTPATIENT)
Dept: CASE MANAGEMENT | Facility: OTHER | Age: 65
End: 2020-04-08

## 2020-04-10 ENCOUNTER — APPOINTMENT (OUTPATIENT)
Dept: WOUND CARE | Facility: HOSPITAL | Age: 65
End: 2020-04-10
Payer: MEDICARE

## 2020-04-10 PROCEDURE — 29445 APPL RIGID TOT CNTC LEG CAST: CPT

## 2020-04-13 ENCOUNTER — TELEPHONE (OUTPATIENT)
Dept: FAMILY MEDICINE CLINIC | Facility: CLINIC | Age: 65
End: 2020-04-13

## 2020-04-13 DIAGNOSIS — R63.4 WEIGHT LOSS, UNINTENTIONAL: ICD-10-CM

## 2020-04-13 DIAGNOSIS — I70.90 ARTERIAL OCCLUSION: ICD-10-CM

## 2020-04-13 DIAGNOSIS — R63.8 INADEQUATE ORAL INTAKE: ICD-10-CM

## 2020-04-13 DIAGNOSIS — L97.529 ISCHEMIC ULCER OF TOE OF LEFT FOOT, WITH UNSPECIFIED SEVERITY (HCC): ICD-10-CM

## 2020-04-13 RX ORDER — LACTOSE-REDUCED FOOD
1 LIQUID (ML) ORAL 3 TIMES DAILY
Qty: 90 BOTTLE | Refills: 2 | Status: SHIPPED | OUTPATIENT
Start: 2020-04-13 | End: 2020-05-01 | Stop reason: SDUPTHER

## 2020-04-14 ENCOUNTER — APPOINTMENT (OUTPATIENT)
Dept: WOUND CARE | Facility: HOSPITAL | Age: 65
End: 2020-04-14
Payer: MEDICARE

## 2020-04-14 ENCOUNTER — TELEPHONE (OUTPATIENT)
Dept: FAMILY MEDICINE CLINIC | Facility: CLINIC | Age: 65
End: 2020-04-14

## 2020-04-14 PROCEDURE — 11042 DBRDMT SUBQ TIS 1ST 20SQCM/<: CPT

## 2020-04-17 ENCOUNTER — APPOINTMENT (OUTPATIENT)
Dept: WOUND CARE | Facility: HOSPITAL | Age: 65
End: 2020-04-17
Payer: MEDICARE

## 2020-04-17 PROCEDURE — 11042 DBRDMT SUBQ TIS 1ST 20SQCM/<: CPT

## 2020-04-21 ENCOUNTER — APPOINTMENT (OUTPATIENT)
Dept: WOUND CARE | Facility: HOSPITAL | Age: 65
End: 2020-04-21
Payer: MEDICARE

## 2020-04-21 PROCEDURE — 99211 OFF/OP EST MAY X REQ PHY/QHP: CPT

## 2020-04-24 ENCOUNTER — APPOINTMENT (OUTPATIENT)
Dept: WOUND CARE | Facility: HOSPITAL | Age: 65
End: 2020-04-24
Payer: MEDICARE

## 2020-04-24 PROCEDURE — 11042 DBRDMT SUBQ TIS 1ST 20SQCM/<: CPT

## 2020-05-01 ENCOUNTER — APPOINTMENT (OUTPATIENT)
Dept: WOUND CARE | Facility: HOSPITAL | Age: 65
End: 2020-05-01
Payer: MEDICARE

## 2020-05-01 ENCOUNTER — TELEMEDICINE (OUTPATIENT)
Dept: FAMILY MEDICINE CLINIC | Facility: CLINIC | Age: 65
End: 2020-05-01
Payer: MEDICARE

## 2020-05-01 DIAGNOSIS — R63.4 WEIGHT LOSS, UNINTENTIONAL: ICD-10-CM

## 2020-05-01 DIAGNOSIS — R63.8 INADEQUATE ORAL INTAKE: ICD-10-CM

## 2020-05-01 DIAGNOSIS — L97.529 ISCHEMIC ULCER OF TOE OF LEFT FOOT, WITH UNSPECIFIED SEVERITY (HCC): ICD-10-CM

## 2020-05-01 DIAGNOSIS — R20.0 NUMBNESS OF TOES: Primary | ICD-10-CM

## 2020-05-01 DIAGNOSIS — I70.90 ARTERIAL OCCLUSION: ICD-10-CM

## 2020-05-01 PROBLEM — R73.03 PRE-DIABETES: Status: ACTIVE | Noted: 2020-05-01

## 2020-05-01 PROCEDURE — 11042 DBRDMT SUBQ TIS 1ST 20SQCM/<: CPT

## 2020-05-01 PROCEDURE — 99443 PR PHYS/QHP TELEPHONE EVALUATION 21-30 MIN: CPT | Performed by: FAMILY MEDICINE

## 2020-05-01 RX ORDER — LACTOSE-REDUCED FOOD
1 LIQUID (ML) ORAL 3 TIMES DAILY
Qty: 90 BOTTLE | Refills: 2 | Status: SHIPPED | OUTPATIENT
Start: 2020-05-01

## 2020-05-05 ENCOUNTER — PATIENT OUTREACH (OUTPATIENT)
Dept: CASE MANAGEMENT | Facility: OTHER | Age: 65
End: 2020-05-05

## 2020-05-05 ENCOUNTER — TELEPHONE (OUTPATIENT)
Dept: CARDIOLOGY CLINIC | Facility: CLINIC | Age: 65
End: 2020-05-05

## 2020-05-06 ENCOUNTER — HOSPITAL ENCOUNTER (OUTPATIENT)
Dept: RADIOLOGY | Facility: HOSPITAL | Age: 65
Discharge: HOME | End: 2020-05-06
Attending: SURGERY
Payer: MEDICARE

## 2020-05-06 DIAGNOSIS — L97.529 ISCHEMIC ULCER OF TOE OF LEFT FOOT, WITH UNSPECIFIED SEVERITY (HCC): ICD-10-CM

## 2020-05-06 PROCEDURE — 93925 LOWER EXTREMITY STUDY: CPT

## 2020-05-06 PROCEDURE — 93923 UPR/LXTR ART STDY 3+ LVLS: CPT

## 2020-05-07 ENCOUNTER — PATIENT OUTREACH (OUTPATIENT)
Dept: CASE MANAGEMENT | Facility: OTHER | Age: 65
End: 2020-05-07

## 2020-05-08 ENCOUNTER — TELEPHONE (OUTPATIENT)
Dept: VASCULAR SURGERY | Facility: CLINIC | Age: 65
End: 2020-05-08

## 2020-05-08 ENCOUNTER — APPOINTMENT (OUTPATIENT)
Dept: WOUND CARE | Facility: HOSPITAL | Age: 65
End: 2020-05-08
Payer: MEDICARE

## 2020-05-08 PROCEDURE — 11042 DBRDMT SUBQ TIS 1ST 20SQCM/<: CPT

## 2020-05-08 PROCEDURE — 93925 LOWER EXTREMITY STUDY: CPT | Performed by: SURGERY

## 2020-05-08 PROCEDURE — 93922 UPR/L XTREMITY ART 2 LEVELS: CPT | Performed by: SURGERY

## 2020-05-11 ENCOUNTER — PATIENT OUTREACH (OUTPATIENT)
Dept: FAMILY MEDICINE CLINIC | Facility: CLINIC | Age: 65
End: 2020-05-11

## 2020-05-12 ENCOUNTER — PATIENT OUTREACH (OUTPATIENT)
Dept: CASE MANAGEMENT | Facility: OTHER | Age: 65
End: 2020-05-12

## 2020-05-12 ENCOUNTER — TELEMEDICINE (OUTPATIENT)
Dept: VASCULAR SURGERY | Facility: CLINIC | Age: 65
End: 2020-05-12
Payer: MEDICARE

## 2020-05-12 VITALS
HEIGHT: 70 IN | BODY MASS INDEX: 29.2 KG/M2 | DIASTOLIC BLOOD PRESSURE: 79 MMHG | SYSTOLIC BLOOD PRESSURE: 138 MMHG | WEIGHT: 204 LBS

## 2020-05-12 DIAGNOSIS — I21.02 ACUTE ST ELEVATION MYOCARDIAL INFARCTION (STEMI) INVOLVING LEFT ANTERIOR DESCENDING (LAD) CORONARY ARTERY (HCC): ICD-10-CM

## 2020-05-12 DIAGNOSIS — Z72.0 TOBACCO USER: ICD-10-CM

## 2020-05-12 DIAGNOSIS — I74.4 ARTERY OF EXTREMITY, EMBOLISM AND THROMBOSIS (HCC): Primary | ICD-10-CM

## 2020-05-12 DIAGNOSIS — L97.529 ISCHEMIC ULCER OF TOE OF LEFT FOOT, WITH UNSPECIFIED SEVERITY (HCC): ICD-10-CM

## 2020-05-12 PROBLEM — I70.90 ARTERIAL OCCLUSION: Status: RESOLVED | Noted: 2020-02-17 | Resolved: 2020-05-12

## 2020-05-12 PROCEDURE — 99214 OFFICE O/P EST MOD 30 MIN: CPT | Performed by: SURGERY

## 2020-05-14 ENCOUNTER — TELEPHONE (OUTPATIENT)
Dept: FAMILY MEDICINE CLINIC | Facility: CLINIC | Age: 65
End: 2020-05-14

## 2020-05-15 ENCOUNTER — APPOINTMENT (OUTPATIENT)
Dept: WOUND CARE | Facility: HOSPITAL | Age: 65
End: 2020-05-15
Payer: MEDICARE

## 2020-05-15 PROCEDURE — 11042 DBRDMT SUBQ TIS 1ST 20SQCM/<: CPT

## 2020-05-21 ENCOUNTER — APPOINTMENT (OUTPATIENT)
Dept: WOUND CARE | Facility: HOSPITAL | Age: 65
End: 2020-05-21
Payer: MEDICARE

## 2020-05-21 PROCEDURE — 99213 OFFICE O/P EST LOW 20 MIN: CPT

## 2020-06-01 ENCOUNTER — APPOINTMENT (OUTPATIENT)
Dept: WOUND CARE | Facility: HOSPITAL | Age: 65
End: 2020-06-01
Payer: MEDICARE

## 2020-06-01 PROCEDURE — 97597 DBRDMT OPN WND 1ST 20 CM/<: CPT

## 2020-06-08 ENCOUNTER — APPOINTMENT (OUTPATIENT)
Dept: WOUND CARE | Facility: HOSPITAL | Age: 65
End: 2020-06-08
Payer: MEDICARE

## 2020-06-08 PROCEDURE — 97597 DBRDMT OPN WND 1ST 20 CM/<: CPT

## 2020-06-15 ENCOUNTER — OFFICE VISIT (OUTPATIENT)
Dept: WOUND CARE | Facility: HOSPITAL | Age: 65
End: 2020-06-15
Payer: MEDICARE

## 2020-06-15 PROCEDURE — 99213 OFFICE O/P EST LOW 20 MIN: CPT

## 2020-06-17 ENCOUNTER — PATIENT OUTREACH (OUTPATIENT)
Dept: CASE MANAGEMENT | Facility: OTHER | Age: 65
End: 2020-06-17

## 2020-06-19 ENCOUNTER — PATIENT OUTREACH (OUTPATIENT)
Dept: FAMILY MEDICINE CLINIC | Facility: CLINIC | Age: 65
End: 2020-06-19

## 2020-07-08 ENCOUNTER — PATIENT OUTREACH (OUTPATIENT)
Dept: CASE MANAGEMENT | Facility: OTHER | Age: 65
End: 2020-07-08

## 2020-07-08 NOTE — PROGRESS NOTES
Voicemail received from the patient  Called him back  He states he requested transportation from cardio, but was told no  Reminded him that it is usually a one time only service & at the practice's discretion  Discussed [place] transit & [employer]  At least one stop noted on google maps on his block, but he states the nearest is 10 blocks from him  He has used [employer] in the past & states he can do so again  He was already planning to call & reschedule today's cardio appt  He states he finally got the paperwork to approve the Ensure from his insurance  He just needs his PCP to fill out their portion  He states he will drop this off in person  He confirms he has all of his meds & continues to take them as ordered  He denies chest pain, edema, SOB, fever, aches, chills, weakness, fatigue, n/v, or diarrhea  He is currently out for a walk  He will call back if he has continued difficulty arranging transportation or submitting his forms

## 2020-07-09 ENCOUNTER — APPOINTMENT (OUTPATIENT)
Dept: WOUND CARE | Facility: HOSPITAL | Age: 65
End: 2020-07-09
Payer: MEDICARE

## 2020-07-09 PROCEDURE — 99212 OFFICE O/P EST SF 10 MIN: CPT

## 2020-07-25 ENCOUNTER — HOSPITAL ENCOUNTER (EMERGENCY)
Facility: HOSPITAL | Age: 65
Discharge: HOME | End: 2020-07-25
Attending: EMERGENCY MEDICINE | Admitting: EMERGENCY MEDICINE
Payer: MEDICARE

## 2020-07-25 ENCOUNTER — APPOINTMENT (EMERGENCY)
Dept: RADIOLOGY | Facility: HOSPITAL | Age: 65
End: 2020-07-25
Payer: MEDICARE

## 2020-07-25 VITALS
SYSTOLIC BLOOD PRESSURE: 129 MMHG | HEART RATE: 60 BPM | TEMPERATURE: 97 F | RESPIRATION RATE: 19 BRPM | DIASTOLIC BLOOD PRESSURE: 75 MMHG | WEIGHT: 218.26 LBS | BODY MASS INDEX: 31.32 KG/M2 | OXYGEN SATURATION: 95 %

## 2020-07-25 DIAGNOSIS — M54.50 ACUTE LEFT-SIDED LOW BACK PAIN WITHOUT SCIATICA: Primary | ICD-10-CM

## 2020-07-25 PROCEDURE — 72100 X-RAY EXAM L-S SPINE 2/3 VWS: CPT

## 2020-07-25 PROCEDURE — 99284 EMERGENCY DEPT VISIT MOD MDM: CPT

## 2020-07-25 PROCEDURE — 96372 THER/PROPH/DIAG INJ SC/IM: CPT

## 2020-07-25 PROCEDURE — 99284 EMERGENCY DEPT VISIT MOD MDM: CPT | Performed by: EMERGENCY MEDICINE

## 2020-07-25 RX ORDER — NAPROXEN 500 MG/1
500 TABLET ORAL 2 TIMES DAILY WITH MEALS
Qty: 14 TABLET | Refills: 0 | Status: SHIPPED | OUTPATIENT
Start: 2020-07-25 | End: 2020-08-01

## 2020-07-25 RX ORDER — LIDOCAINE 50 MG/G
1 PATCH TOPICAL ONCE
Status: DISCONTINUED | OUTPATIENT
Start: 2020-07-25 | End: 2020-07-25 | Stop reason: HOSPADM

## 2020-07-25 RX ORDER — CYCLOBENZAPRINE HCL 10 MG
10 TABLET ORAL ONCE
Status: COMPLETED | OUTPATIENT
Start: 2020-07-25 | End: 2020-07-25

## 2020-07-25 RX ORDER — KETOROLAC TROMETHAMINE 30 MG/ML
15 INJECTION, SOLUTION INTRAMUSCULAR; INTRAVENOUS ONCE
Status: COMPLETED | OUTPATIENT
Start: 2020-07-25 | End: 2020-07-25

## 2020-07-25 RX ORDER — LIDOCAINE 50 MG/G
1 PATCH TOPICAL DAILY
Qty: 30 PATCH | Refills: 0 | Status: SHIPPED | OUTPATIENT
Start: 2020-07-25 | End: 2020-08-24

## 2020-07-25 RX ORDER — CYCLOBENZAPRINE HCL 10 MG
10 TABLET ORAL 3 TIMES DAILY PRN
Qty: 15 TABLET | Refills: 0 | Status: SHIPPED | OUTPATIENT
Start: 2020-07-25

## 2020-07-25 RX ADMIN — LIDOCAINE 1 PATCH: 50 PATCH CUTANEOUS at 08:53

## 2020-07-25 RX ADMIN — CYCLOBENZAPRINE HYDROCHLORIDE 10 MG: 10 TABLET, FILM COATED ORAL at 08:53

## 2020-07-25 RX ADMIN — KETOROLAC TROMETHAMINE 15 MG: 30 INJECTION, SOLUTION INTRAMUSCULAR at 08:53

## 2020-07-25 NOTE — ED NOTES
Pt discharged, ambulated to waiting room, gait slow but steady         Nadia Hadley RN  07/25/20 2788

## 2020-07-25 NOTE — ED NOTES
Explained to patient what medications were ordered  Patient states "Flexeril and Lidocaine don't do nothing for me "  Offered to have Dr Maria Ines Banda come back and speak with him, but patient states "Just give me the medications  What I need is a CT scan, not an xray "  Dr Maria Ines Banda aware       Valeria Rai, IRAM  07/25/20 0957

## 2020-07-25 NOTE — ED PROVIDER NOTES
History  Chief Complaint   Patient presents with    Back Pain     patient c/o back pain lower back radiating to right hip   pain started on Wednesday after carrying groceries  Patient presents for evaluation of lower back pain  Pain is in the middle lower back and left side  No radiation down the leg  Denies numbness or weakness in the leg  No difficulty urinating or moving bowels  Pain worse with movement  Started after he bent over to  groceries after a bag ripped on Wednesday  Has not been taking anything for pain at home  History provided by:  Patient   used: No    Back Pain   Associated symptoms: no abdominal pain, no chest pain, no fever, no numbness and no weakness        Prior to Admission Medications   Prescriptions Last Dose Informant Patient Reported? Taking? Misc   Devices (ROLLATOR) MISC  Self No No   Sig: by Does not apply route as needed (for ambulation)   Nutritional Supplements (ENSURE NUTRITION SHAKE) LIQD Past Week at Unknown time Self No Yes   Sig: Take 1 Bottle by mouth 3 (three) times a day   aspirin 81 mg chewable tablet 7/24/2020 at Unknown time Self No Yes   Sig: Chew 1 tablet (81 mg total) daily   atorvastatin (LIPITOR) 80 mg tablet 7/24/2020 at Unknown time Self No Yes   Sig: Take 1 tablet (80 mg total) by mouth every evening   clopidogrel (PLAVIX) 75 mg tablet 7/24/2020 at Unknown time Self No Yes   Sig: Take 1 tablet (75 mg total) by mouth daily   metoprolol succinate (TOPROL-XL) 25 mg 24 hr tablet 7/24/2020 at Unknown time Self No Yes   Sig: Take 1 tablet (25 mg total) by mouth daily   nitroglycerin (NITROSTAT) 0 4 mg SL tablet More than a month at Unknown time Self No No   Sig: Place 1 tablet (0 4 mg total) under the tongue every 5 (five) minutes as needed for chest pain   ranolazine (RANEXA) 500 mg 12 hr tablet 7/24/2020 at Unknown time Self No Yes   Sig: Take 1 tablet (500 mg total) by mouth 2 (two) times a day   topiramate (TOPAMAX) 100 mg tablet 7/24/2020 at Unknown time Self No Yes   Sig: Take 1 tablet (100 mg total) by mouth 2 (two) times a day      Facility-Administered Medications: None       Past Medical History:   Diagnosis Date    Bipolar 1 disorder ([facility] [address] )     CAD (coronary artery disease)     History of ventricular fibrillation 12/27/2019    in setting in-stent thrombosis; received defibrillation with 200J x4; received less than 30s chest compressions before ROSC achieved    Schizo-affective psychosis ([facility] [address] )     Tobacco user        Past Surgical History:   Procedure Laterality Date    BACK SURGERY      CARDIAC SURGERY      stent placement    CHOLECYSTECTOMY      IR LOWER EXTREMITY / INTERVENTION  2/18/2020    KNEE SURGERY Right        Family History   Problem Relation Age of Onset    Heart disease Mother     Cancer Mother     Heart disease Father     Cancer Father     Multiple sclerosis Sister     Cancer Sister      I have reviewed and agree with the history as documented  E-Cigarette/Vaping    E-Cigarette Use Never User      E-Cigarette/Vaping Substances     Social History     Tobacco Use    Smoking status: Heavy Tobacco Smoker     Packs/day: 1 50     Types: Cigarettes    Smokeless tobacco: Never Used   Substance Use Topics    Alcohol use: Not Currently     Frequency: Never    Drug use: Never       Review of Systems   Constitutional: Negative for diaphoresis and fever  Respiratory: Negative for shortness of breath  Cardiovascular: Negative for chest pain  Gastrointestinal: Negative for abdominal pain  Genitourinary: Negative for difficulty urinating and hematuria  Musculoskeletal: Positive for back pain  Neurological: Negative for weakness and numbness  All other systems reviewed and are negative  Physical Exam  Physical Exam   Constitutional: He is oriented to person, place, and time  No distress  Poor hygiene patient covered in dirt, bottom of feet are black with dirt      Eyes: Pupils are equal, round, and reactive to light  Cardiovascular: Normal rate, regular rhythm and intact distal pulses  Pulmonary/Chest: Effort normal and breath sounds normal  No respiratory distress  Abdominal: Soft  Bowel sounds are normal  He exhibits no distension  There is no tenderness  There is no rebound and no guarding  Musculoskeletal: He exhibits tenderness  Arms:  Neurological: He is alert and oriented to person, place, and time  No sensory deficit  He exhibits normal muscle tone  Skin: Capillary refill takes less than 2 seconds  He is not diaphoretic  Nursing note and vitals reviewed  Vital Signs  ED Triage Vitals [07/25/20 0820]   Temperature Pulse Respirations Blood Pressure SpO2   (!) 97 °F (36 1 °C) 60 19 129/75 95 %      Temp Source Heart Rate Source Patient Position - Orthostatic VS BP Location FiO2 (%)   Tympanic Monitor Lying Right arm --      Pain Score       Worst Possible Pain           Vitals:    07/25/20 0820   BP: 129/75   Pulse: 60   Patient Position - Orthostatic VS: Lying         Visual Acuity      ED Medications  Medications   ketorolac (TORADOL) injection 15 mg (15 mg Intramuscular Given 7/25/20 0853)   cyclobenzaprine (FLEXERIL) tablet 10 mg (10 mg Oral Given 7/25/20 0853)       Diagnostic Studies  Results Reviewed     None                 XR lumbar spine 2 or 3 views    (Results Pending)              Procedures  Procedures         ED Course       US AUDIT      Most Recent Value   Initial Alcohol Screen: US AUDIT-C    1  How often do you have a drink containing alcohol?  0 Filed at: 07/25/2020 0828   2  How many drinks containing alcohol do you have on a typical day you are drinking? 0 Filed at: 07/25/2020 0828   3a  Male UNDER 65: How often do you have five or more drinks on one occasion? 0 Filed at: 07/25/2020 8443   Audit-C Score  0 Filed at: 07/25/2020 1668                  STEVE/DAST-10      Most Recent Value   How many times in the past year have you       Used an illegal drug or used a prescription medication for non-medical reasons? Never Filed at: 07/25/2020 0830                                [facility]  Number of Diagnoses or Management Options  Acute left-sided low back pain without sciatica:   Diagnosis management comments: Pulse ox 95% on RA indicating adequate oxygenation  Xray L Spine: no fx or dislocation as read by me         Amount and/or Complexity of Data Reviewed  Tests in the radiology section of CPT®: ordered and reviewed  Decide to obtain previous medical records or to obtain history from someone other than the patient: yes  Review and summarize past medical records: yes  Independent visualization of images, tracings, or specimens: yes    Patient Progress  Patient progress: stable        Disposition  Final diagnoses:   Acute left-sided low back pain without sciatica     Time reflects when diagnosis was documented in both MDM as applicable and the Disposition within this note     Time User Action Codes Description Comment    7/25/2020 10:11 AM Garnet Fabry Add [M54 5] Acute left-sided low back pain without sciatica       ED Disposition     ED Disposition Condition Date/Time Comment    Discharge Stable Sat Jul 25, 2020 Lenny Guerrero discharge to home/self care              Follow-up Information     Follow up With Specialties Details Why Contact Info    Rachel Rayo MD Orthopedic Surgery In 1 week  [address]  [phone]            Discharge Medication List as of 7/25/2020 10:12 AM      START taking these medications    Details   cyclobenzaprine (FLEXERIL) 10 mg tablet Take 1 tablet (10 mg total) by mouth 3 (three) times a day as needed for muscle spasms for up to 15 doses, Starting Sat 7/25/2020, Normal      lidocaine (LIDODERM) 5 % Apply 1 patch topically daily Remove & Discard patch within 12 hours or as directed by MD, Starting Sat 7/25/2020, Until Mon 8/24/2020, Normal      naproxen (NAPROSYN) 500 mg tablet Take 1 tablet (500 mg total) by mouth 2 (two) times a day with meals for 7 days, Starting Sat 7/25/2020, Until Sat 8/1/2020, Normal         CONTINUE these medications which have NOT CHANGED    Details   aspirin 81 mg chewable tablet Chew 1 tablet (81 mg total) daily, Starting u 4/2/2020, Normal      atorvastatin (LIPITOR) 80 mg tablet Take 1 tablet (80 mg total) by mouth every evening, Starting Thu 4/2/2020, Normal      clopidogrel (PLAVIX) 75 mg tablet Take 1 tablet (75 mg total) by mouth daily, Starting Thu 4/2/2020, Normal      metoprolol succinate (TOPROL-XL) 25 mg 24 hr tablet Take 1 tablet (25 mg total) by mouth daily, Starting Thu 4/2/2020, Normal      Nutritional Supplements (ENSURE NUTRITION SHAKE) LIQD Take 1 Bottle by mouth 3 (three) times a day, Starting Fri 5/1/2020, Normal      ranolazine (RANEXA) 500 mg 12 hr tablet Take 1 tablet (500 mg total) by mouth 2 (two) times a day, Starting u 4/2/2020, Normal      topiramate (TOPAMAX) 100 mg tablet Take 1 tablet (100 mg total) by mouth 2 (two) times a day, Starting u 4/2/2020, No Print      Misc   Devices (ROLLATOR) MISC by Does not apply route as needed (for ambulation), Starting Mon 3/16/2020, Print      nitroglycerin (NITROSTAT) 0 4 mg SL tablet Place 1 tablet (0 4 mg total) under the tongue every 5 (five) minutes as needed for chest pain, Starting Thu 4/2/2020, Normal               PDMP Review       Value Time User    PDMP Reviewed  Yes 12/29/2019  8:17 AM Bebe Loza PA-C          ED Provider  Electronically Signed by           Jona Samuel DO  07/25/20 2803

## 2020-07-27 ENCOUNTER — PATIENT OUTREACH (OUTPATIENT)
Dept: CASE MANAGEMENT | Facility: OTHER | Age: 65
End: 2020-07-27

## 2020-07-27 NOTE — PROGRESS NOTES
Attempted outreach  The call went straight to voicemail which hasn't been set up yet, unable to leave a message

## 2020-07-28 ENCOUNTER — TELEPHONE (OUTPATIENT)
Dept: PHYSICAL THERAPY | Facility: OTHER | Age: 65
End: 2020-07-28

## 2020-07-28 NOTE — TELEPHONE ENCOUNTER
Unable to LM to call [facility] - patients VM not set-up yet      Referral deferred for f/u

## 2020-07-31 ENCOUNTER — TELEPHONE (OUTPATIENT)
Dept: PHYSICAL THERAPY | Facility: OTHER | Age: 65
End: 2020-07-31

## 2020-07-31 NOTE — TELEPHONE ENCOUNTER
Patient called [facility] phone  Nurse discussed [facility] and offerings  Patient stated he does not want physical therapy  Nurse discussed the evaluation process and benefits from having this done  Patient stated he wants/needs to see an orthopedic doctor  Nurse discussed evaluation again and informed a referral may be done if warranted etc  Patient became agitated and stated, "I don't need a referral"! Nurse calmly stated she was trying to offer help through our program and it is his choice to participate  Patient to call [facility] if needed      Referral closed

## 2020-07-31 NOTE — TELEPHONE ENCOUNTER
Voice mail/message left for patient to return call to [place] [id number] program including our hours of business and phone number  Reminded CB will come from a non-SL number as the nurses are working remotely/off-site  Pts VM was set up on this 2nd attempt to reach patient    Referral closed per protocol

## 2020-08-05 ENCOUNTER — PATIENT OUTREACH (OUTPATIENT)
Dept: CASE MANAGEMENT | Facility: OTHER | Age: 65
End: 2020-08-05

## 2020-08-06 ENCOUNTER — PATIENT OUTREACH (OUTPATIENT)
Dept: CASE MANAGEMENT | Facility: OTHER | Age: 65
End: 2020-08-06

## 2020-08-06 NOTE — PROGRESS NOTES
Patient called back  He states his back pain has improved with OTC pain relievers  He is not interested in following up with ortho at this time  He is going to cardio later today & will bring the insurance form for his Ensure with him  He states he has everything in order and he is doing well  He agrees to one last call in the fall

## 2020-08-20 ENCOUNTER — TELEPHONE (OUTPATIENT)
Dept: FAMILY MEDICINE CLINIC | Facility: CLINIC | Age: 65
End: 2020-08-20

## 2020-09-24 ENCOUNTER — TELEPHONE (OUTPATIENT)
Dept: FAMILY MEDICINE CLINIC | Facility: CLINIC | Age: 65
End: 2020-09-24

## 2020-10-29 ENCOUNTER — PATIENT OUTREACH (OUTPATIENT)
Dept: CASE MANAGEMENT | Facility: OTHER | Age: 65
End: 2020-10-29

## 2020-12-22 ENCOUNTER — TELEPHONE (OUTPATIENT)
Dept: FAMILY MEDICINE CLINIC | Facility: CLINIC | Age: 65
End: 2020-12-22

## 2020-12-23 NOTE — TELEPHONE ENCOUNTER
Received a form from [address] [place] [address] to fill out for home delivered meals  Since I was unsure and there is no documentation as to why pt requires this service, I called the pt and spoke to him and he informed me that he has moved to [place] and is not planning on returning to [place] and does not need these services anymore  The form has been discarded  Thank you!

## 2021-02-26 ENCOUNTER — TELEPHONE (OUTPATIENT)
Dept: ADMINISTRATIVE | Facility: HOSPITAL | Age: 66
End: 2021-02-26

## 2021-02-26 NOTE — TELEPHONE ENCOUNTER
Unable to leave message    Please schedule patient for the following appointments    [] Carotid doppler (CV)  [] Abdominal Aorta Iliac (AOIL)  [x] Lower Limb Arterial (JOHN)  [] Renal Artery  [] Upper Limb (ULEA)  [] EVAR  [] Advanced Imaging (CT/CTA) NOT NEEDED  [] Office Visit Follow-up for Risk Factor Modification (RFM) not due until 5/12/2021 or after

## 2021-02-26 NOTE — LETTER
4/8/21    Dear James Apple,     [address] [facility] office has tried to contact you several times in regards to scheduling test(s) that you are due for  Your health and follow-up medical care are important to us  We would like to schedule the test(s) for you as soon as possible  Please call our office at [phone], option #3 so we can assist in scheduling the appointment for you  If you have already scheduled your appointment, please disregard this letter  Thank you       Sincerely,        [facility]

## 2021-04-19 ENCOUNTER — TELEPHONE (OUTPATIENT)
Dept: VASCULAR SURGERY | Facility: CLINIC | Age: 66
End: 2021-04-19

## 2021-04-19 NOTE — TELEPHONE ENCOUNTER
Mailed letter to pt stating we were trying to contact him to schedule appt, letter was returned  Read telephone encounter from 12/22/2020 from [facility] and pt has moved out of state

## 2021-08-17 ENCOUNTER — TELEPHONE (OUTPATIENT)
Dept: VASCULAR SURGERY | Facility: CLINIC | Age: 66
End: 2021-08-17

## 2022-06-27 NOTE — PROGRESS NOTES
Progress Note - Sivakumar Marroquin 1955, 60 y o  male MRN: [id number]    Unit/Bed#: [facility] 806-01 Encounter: [id number]    Primary Care Provider: Margarito Galvez DO   Date and time admitted to hospital: 2/17/2020  8:27 PM        * Arterial occlusion  Assessment & Plan  IR consult for possible lysis this morning  Continue serial neurovascular checks    Three week history of pain in left foot and chronic nonhealing ulcer  Was being treated for presumed cellulitis previously, with no relief  CTA lower extremity (2/17): Subacute or chronic occlusion of left P1 and P2 segments with reconstitution at P3 segment via collaterals  Bilateral tibial peroneal disease as described above  Moderate focal stenosis at the junction of the right SFA-popliteal artery/adductor canal         S/P coronary artery stent placement  Assessment & Plan  Stent placed in 12/2019  Stable, continue ASA and plavix    Bipolar 1 disorder (HCC)  Assessment & Plan  Stable  Continue PTA Topiramate    Tobacco user  Assessment & Plan  Current every day smoker, 1 5 PPD  Refused nicotine patch   on smoking cessation     VTE Pharmacologic Prophylaxis: Heparin  VTE Mechanical Prophylaxis: sequential compression device  Diet: NPO for now  Code Status:level 3  Disposition:anticipate to stay 1 more midnight    Discussed with attending physician, Dr Zurdo Keller, and  FM team     Subjective:   Patient denies any fever, headache, dizziness, SOB, chest pain, palpitations and NVD  Overnight: patient is NPO and waiting IR and vascular consult         Objective:   Current Facility-Administered Medications   Medication Dose Route Frequency    acetaminophen (TYLENOL) tablet 650 mg  650 mg Oral Q6H PRN    aspirin chewable tablet 81 mg  81 mg Oral Daily    atorvastatin (LIPITOR) tablet 80 mg  80 mg Oral QPM    clopidogrel (PLAVIX) tablet 75 mg  75 mg Oral Daily    fentanyl citrate (PF) 100 MCG/2ML   Intravenous Code/Trauma/Sedation Med    heparin (porcine) 25,000 units in 250 mL infusion (premix)  3-30 Units/kg/hr (Order-Specific) Intravenous Titrated    heparin (porcine) injection    Code/Trauma/Sedation Med    HYDROmorphone (DILAUDID) injection 0 5 mg  0 5 mg Intravenous Q3H PRN    HYDROmorphone (PF) (DILAUDID) 4 mg/mL injection    Code/Trauma/Sedation Med    lidocaine 1% buffered    Code/Trauma/Sedation Med    metoprolol succinate (TOPROL-XL) 24 hr tablet 25 mg  25 mg Oral Daily    midazolam (VERSED) injection    Code/Trauma/Sedation Med    oxyCODONE (ROXICODONE) immediate release tablet 10 mg  10 mg Oral Q4H PRN    oxyCODONE (ROXICODONE) IR tablet 5 mg  5 mg Oral Q4H PRN    ranolazine (RANEXA) 12 hr tablet 500 mg  500 mg Oral BID    sodium chloride 0 9 % infusion  125 mL/hr Intravenous Continuous    topiramate (TOPAMAX) tablet 100 mg  100 mg Oral BID     Allergies   Allergen Reactions    Fish-Derived Products Hives    Rice Bean [Phaseolus] Hives       Labs:  Results from last 7 days   Lab Units 02/18/20 0618 02/17/20 2233 02/17/20  1527   WBC Thousand/uL 7 66 10 46* 14 10*   HEMOGLOBIN g/dL 16 2 16 6 18 1*   HEMATOCRIT % 49 0 50 0* 53 1*   PLATELETS Thousands/uL 197 214 238   NEUTROS PCT %  --   --  66   MONOS PCT %  --   --  6     Results from last 7 days   Lab Units 02/18/20 0618 02/17/20  1527   POTASSIUM mmol/L 3 4* 3 5   CHLORIDE mmol/L 110* 104   CO2 mmol/L 27 29   BUN mg/dL 9 12   CREATININE mg/dL 0 70 0 96   CALCIUM mg/dL 8 9 9 4   ALK PHOS U/L  --  70   ALT U/L  --  25   AST U/L  --  9         No results found for: PHOS   Results from last 7 days   Lab Units 02/18/20 0618 02/17/20 2233 02/17/20  1527   INR   --  1 13 1 00   PTT seconds 51* 47* 26         0   Lab Value Date/Time    TROPONINI 7 08 (H) 12/28/2019 0543    TROPONINI 7 97 (H) 12/27/2019 1610    TROPONINI 5 01 (H) 12/27/2019 1250    TROPONINI 0 20 (H) 12/27/2019 0657    TROPONINI 4 38 (H) 11/06/2019 1021    TROPONINI 6 07 (H) 11/06/2019 0606    TROPONINI 5 49 (H) 11/06/2019 0124    TROPONINI 4 89 (H) 11/05/2019 2237    TROPONINI 1 15 (H) 11/05/2019 1943    TROPONINI 2 16 (H) 08/14/2019 0105    TROPONINI 1 53 (H) 08/13/2019 2309    TROPONINI 0 87 (H) 08/13/2019 2108    TROPONINI 0 21 (H) 08/13/2019 1731       Imaging/Other:    No new imaging    Vitals:  Vitals:    02/18/20 1257 02/18/20 1302 02/18/20 1307 02/18/20 1312   BP: 120/71 135/67 133/73 136/77   Pulse: (!) 48 (!) 50 (!) 49 (!) 53   Resp: 16 14 12 (!) 11   Temp:       TempSrc:       SpO2: 99% 100% 99% 96%         Intake/Output Summary (Last 24 hours) at 2/18/2020 1317  Last data filed at 2/18/2020 0800  Gross per 24 hour   Intake 0 ml   Output --   Net 0 ml       Physical Exam:   General: No acute distress  HEENT  Head: NC/AT  Eyes: Pupils equal and reactive to light  Mouth: Mucus membranes moist  Neck: No lymphadenopathy   Cardio: Normal S1 S2, regular rate and rhythm  Resp: Good respiratory effort, lungs clear to auscultation bilaterally  Abdomen: Soft, non distended, non tender to palpation, normal active bowel sounds  Extremities: No LE edema, decreased dorsalis pedis pulse of the left foot   Skin: Warm, dry, no rash   Positive for poor hygiene, multiple scattered healing scars on the left leg, ulcer on the lateral left foot, decreased warmth of left foot  Neuro: awake, alert, oriented x3  Psych: normal behavior      Invasive Devices     Peripheral Intravenous Line            Peripheral IV 02/17/20 Right Antecubital less than 1 day                  AJAY ESTES, PGY-1  Tompa U  2  Yes

## 2022-08-03 ENCOUNTER — TELEPHONE (OUTPATIENT)
Dept: FAMILY MEDICINE CLINIC | Facility: CLINIC | Age: 67
End: 2022-08-03

## 2022-08-04 NOTE — ED NOTES
Patient transported to [address] [place], [place] [address], [address] [place] [address]  11/05/19 2053 Rehab Fall Note    Date of fall: 2/21/2022     Time of incident/discovery? 0230     Witnessed or Unwitnessed: witnessed     Assisted or unassisted?: assisted     Staff member present? CNA      Head strike?: no    What is the patient's orientation status? oriented x 4    Location of fall: patient's room     Was this a fall with therapy? No      Patient had a fall from: standing/ambulating     Injury from fall: none     Persons contacted regarding the fall: family/caregiver, physician and charge nurse     Post fall huddle called: yes     Persons present at post fall huddle: patient, nurse and nursing aide     Interventions to prevent another fall: alarms on, call light within reach, shoes on, room close to nursing station, signs on patient's door and timed void in place     Was the call light used? Yes    Did the bed or chair alarm sound? No      If no alarm sounded, do the alarms work? Yes     If alarm malfunctioned, was a maintenance request submitted? N/A     Was the pt. wearing a seatbelt prior to the fall? No (with explanation) Pt fell while standing to get into wheelchair from bed     If wearing, did the pt. take off the seatbelt? N/A     What is the patient's transfer status? Max 2     Other fall details: Pt stood up from the bed and was guided to the floor by the CNA.        Yes

## 2022-08-17 ENCOUNTER — TELEPHONE (OUTPATIENT)
Dept: FAMILY MEDICINE CLINIC | Facility: CLINIC | Age: 67
End: 2022-08-17

## 2022-09-26 NOTE — TELEPHONE ENCOUNTER
Problem: Plan of Care - These are the overarching goals to be used throughout the patient stay.    Goal: Plan of Care Review/Shift Note  Description: The Plan of Care Review/Shift note should be completed every shift.  The Outcome Evaluation is a brief statement about your assessment that the patient is improving, declining, or no change.  This information will be displayed automatically on your shift note.  Outcome: Ongoing, Progressing   Goal Outcome Evaluation:      Patient was Alert and oriented, denied pain and slept  for 6-7 hours, patient continued with cardiac monitor reading BBB with prolonged QT and inverted T wave. Patient was transferred to [facility] @ 0634 for possible dialysis this AM.No Prn given.                  Dr Maxwell Coe or dr Rosa Delgado [facility] form